# Patient Record
Sex: MALE | Race: WHITE | Employment: FULL TIME | URBAN - METROPOLITAN AREA
[De-identification: names, ages, dates, MRNs, and addresses within clinical notes are randomized per-mention and may not be internally consistent; named-entity substitution may affect disease eponyms.]

---

## 2017-04-03 ENCOUNTER — OFFICE VISIT (OUTPATIENT)
Dept: FAMILY MEDICINE CLINIC | Age: 57
End: 2017-04-03

## 2017-04-03 VITALS
RESPIRATION RATE: 20 BRPM | TEMPERATURE: 97.9 F | HEIGHT: 69 IN | WEIGHT: 207 LBS | SYSTOLIC BLOOD PRESSURE: 136 MMHG | DIASTOLIC BLOOD PRESSURE: 80 MMHG | HEART RATE: 68 BPM | BODY MASS INDEX: 30.66 KG/M2

## 2017-04-03 DIAGNOSIS — Z12.5 SCREENING PSA (PROSTATE SPECIFIC ANTIGEN): ICD-10-CM

## 2017-04-03 DIAGNOSIS — Z11.59 NEED FOR HEPATITIS C SCREENING TEST: ICD-10-CM

## 2017-04-03 DIAGNOSIS — Z79.4 CONTROLLED TYPE 2 DIABETES MELLITUS WITHOUT COMPLICATION, WITH LONG-TERM CURRENT USE OF INSULIN (HCC): Chronic | ICD-10-CM

## 2017-04-03 DIAGNOSIS — E11.9 CONTROLLED TYPE 2 DIABETES MELLITUS WITHOUT COMPLICATION, WITH LONG-TERM CURRENT USE OF INSULIN (HCC): Chronic | ICD-10-CM

## 2017-04-03 DIAGNOSIS — I10 ESSENTIAL HYPERTENSION: Primary | Chronic | ICD-10-CM

## 2017-04-03 DIAGNOSIS — E78.5 HYPERLIPIDEMIA, UNSPECIFIED HYPERLIPIDEMIA TYPE: Chronic | ICD-10-CM

## 2017-04-03 PROCEDURE — 99214 OFFICE O/P EST MOD 30 MIN: CPT | Performed by: FAMILY MEDICINE

## 2017-04-03 PROCEDURE — 90471 IMMUNIZATION ADMIN: CPT | Performed by: FAMILY MEDICINE

## 2017-04-03 PROCEDURE — 90670 PCV13 VACCINE IM: CPT | Performed by: FAMILY MEDICINE

## 2017-04-03 RX ORDER — SIMVASTATIN 40 MG
40 TABLET ORAL NIGHTLY
Qty: 90 TABLET | Refills: 3 | Status: SHIPPED | OUTPATIENT
Start: 2017-04-03 | End: 2018-03-29 | Stop reason: SDUPTHER

## 2017-04-03 RX ORDER — INSULIN GLULISINE 100 [IU]/ML
INJECTION, SOLUTION SUBCUTANEOUS
Qty: 15 PEN | Refills: 3 | Status: SHIPPED | OUTPATIENT
Start: 2017-04-03 | End: 2017-12-19 | Stop reason: SDUPTHER

## 2017-04-03 RX ORDER — LISINOPRIL 20 MG/1
20 TABLET ORAL DAILY
Qty: 90 TABLET | Refills: 3 | Status: SHIPPED | OUTPATIENT
Start: 2017-04-03 | End: 2018-02-05 | Stop reason: SDUPTHER

## 2017-04-03 ASSESSMENT — ENCOUNTER SYMPTOMS
RHINORRHEA: 0
CONSTIPATION: 0
EYE PAIN: 0
ABDOMINAL PAIN: 0
SHORTNESS OF BREATH: 0
COUGH: 0
SORE THROAT: 0
DIARRHEA: 0
NAUSEA: 0
SINUS PRESSURE: 0
ABDOMINAL DISTENTION: 0

## 2017-04-18 ENCOUNTER — TELEPHONE (OUTPATIENT)
Dept: FAMILY MEDICINE CLINIC | Age: 57
End: 2017-04-18

## 2017-10-09 RX ORDER — INSULIN GLARGINE 100 [IU]/ML
22 INJECTION, SOLUTION SUBCUTANEOUS NIGHTLY
Qty: 45 ML | Refills: 1 | Status: SHIPPED | OUTPATIENT
Start: 2017-10-09 | End: 2018-03-29 | Stop reason: SDUPTHER

## 2017-12-19 RX ORDER — INSULIN GLULISINE 100 [IU]/ML
INJECTION, SOLUTION SUBCUTANEOUS
Qty: 15 PEN | Refills: 3 | Status: SHIPPED | OUTPATIENT
Start: 2017-12-19 | End: 2019-04-09

## 2017-12-19 NOTE — TELEPHONE ENCOUNTER
Shawn Verdugo called requesting a refill on the following medications:  Requested Prescriptions     Pending Prescriptions Disp Refills    APIDRA SOLOSTAR 100 UNIT/ML injection pen 15 pen 3     Sig: Inject 0-30 units into skin three times per day per sliding scale     Pharmacy verified:  .[email or web address]     Date of last visit: 04-03-17   Date of next visit (if applicable): Visit date not found

## 2018-01-05 ENCOUNTER — OFFICE VISIT (OUTPATIENT)
Dept: FAMILY MEDICINE CLINIC | Age: 58
End: 2018-01-05
Payer: COMMERCIAL

## 2018-01-05 VITALS
BODY MASS INDEX: 30.35 KG/M2 | SYSTOLIC BLOOD PRESSURE: 100 MMHG | TEMPERATURE: 99.2 F | HEIGHT: 70 IN | RESPIRATION RATE: 16 BRPM | WEIGHT: 212 LBS | HEART RATE: 100 BPM | DIASTOLIC BLOOD PRESSURE: 68 MMHG

## 2018-01-05 DIAGNOSIS — J40 BRONCHITIS: Primary | ICD-10-CM

## 2018-01-05 PROCEDURE — 99213 OFFICE O/P EST LOW 20 MIN: CPT | Performed by: FAMILY MEDICINE

## 2018-01-05 RX ORDER — ALBUTEROL SULFATE 90 UG/1
2 AEROSOL, METERED RESPIRATORY (INHALATION) EVERY 6 HOURS PRN
Qty: 1 INHALER | Refills: 3 | Status: SHIPPED | OUTPATIENT
Start: 2018-01-05 | End: 2018-03-29 | Stop reason: ALTCHOICE

## 2018-01-05 RX ORDER — AZITHROMYCIN 500 MG/1
500 TABLET, FILM COATED ORAL DAILY
Qty: 3 TABLET | Refills: 0 | Status: SHIPPED | OUTPATIENT
Start: 2018-01-05 | End: 2018-01-08

## 2018-01-07 ASSESSMENT — ENCOUNTER SYMPTOMS
COUGH: 1
SINUS PAIN: 1
SORE THROAT: 1
NAUSEA: 0
CONSTIPATION: 0
SHORTNESS OF BREATH: 0
ABDOMINAL PAIN: 0
SINUS PRESSURE: 1
RHINORRHEA: 0
EYE PAIN: 0
WHEEZING: 1
ABDOMINAL DISTENTION: 0
DIARRHEA: 0

## 2018-01-07 NOTE — PROGRESS NOTES
[facility] [address]  [facility]  [address] [place] [address] Rd[address] [id number]  Dept: [phone]  Dept Fax: [phone]  Loc: [phone]  PROGRESS NOTE      Visit Date: 1/7/2018    Rizwana Reynolds is a 56 y.o. male who presents today for:  Chief Complaint   Patient presents with    URI     cough, congestion, sinus drainage, wheezing. Sx started earlier this week. Tried Mucinex D with no relief. Subjective:  HPI  Congestion and sinus drainage sore throat for several days. Symptoms progressing. Nothing makes better or worse. Failed over-the-counter medications. Symptoms are mild to moderate. Review of Systems   Constitutional: Negative for appetite change and fever. HENT: Positive for congestion, postnasal drip, sinus pain, sinus pressure and sore throat. Negative for ear pain and rhinorrhea. Eyes: Negative for pain and visual disturbance. Respiratory: Positive for cough and wheezing. Negative for shortness of breath. Cardiovascular: Negative for chest pain. Gastrointestinal: Negative for abdominal distention, abdominal pain, constipation, diarrhea and nausea. Genitourinary: Negative for dysuria, frequency and urgency. Musculoskeletal: Negative for arthralgias. Skin: Negative for rash. Neurological: Negative for dizziness. Past Medical History:   Diagnosis Date    Hyperlipidemia     Hypertension     Type II or unspecified type diabetes mellitus without mention of complication, not stated as uncontrolled       Past Surgical History:   Procedure Laterality Date    COLONOSCOPY  2/11/13         No family history on file.   Social History   Substance Use Topics    Smoking status: Never Smoker    Smokeless tobacco: Never Used    Alcohol use Yes      Comment: occas      Current Outpatient Prescriptions   Medication Sig Dispense Refill    albuterol sulfate HFA (PROAIR HFA) 108 (90 Base) MCG/ACT inhaler Inhale 2 puffs into the lungs every 6 hours as needed for Wheezing 1 Inhaler 3    azithromycin (ZITHROMAX) 500 MG tablet Take 1 tablet by mouth daily for 3 days 3 tablet 0    APIDRA SOLOSTAR 100 UNIT/ML injection pen Inject 0-30 units into skin three times per day per sliding scale 15 pen 3    LANTUS SOLOSTAR 100 UNIT/ML injection pen INJECT 22 UNITS INTO THE SKIN NIGHTLY. 45 mL 1    simvastatin (ZOCOR) 40 MG tablet Take 1 tablet by mouth nightly 90 tablet 3    lisinopril (PRINIVIL;ZESTRIL) 20 MG tablet Take 1 tablet by mouth daily 90 tablet 3    glucose blood VI test strips (ONE TOUCH ULTRA TEST) strip Use to test blood sugar 8 times daily dx E11.9 750 each 3    aspirin 81 MG EC tablet Take 81 mg by mouth daily. No current facility-administered medications for this visit. No Known Allergies  Health Maintenance   Topic Date Due    HIV screen  07/08/1977    Pneumococcal med risk (1 of 1 - PPSV23) 07/08/1981    DTaP/Tdap/Td vaccine (1 - Tdap) 11/29/2015    Diabetic foot exam  04/03/2018    A1C test (Diabetic or Prediabetic)  04/14/2018    Diabetic microalbuminuria test  04/14/2018    Lipid screen  04/14/2018    Potassium monitoring  04/14/2018    Creatinine monitoring  04/14/2018    Diabetic retinal exam  12/05/2018    Colon cancer screen colonoscopy  02/11/2023    Flu vaccine  Completed    Hepatitis C screen  Completed         Objective:     Physical Exam   Constitutional: He is oriented to person, place, and time. No distress. HENT:   Mouth/Throat: Oropharynx is clear and moist. No oropharyngeal exudate. Eyes: Conjunctivae are normal.   Neck: No thyromegaly present. No carotid bruits   Cardiovascular: Normal rate, regular rhythm, normal heart sounds and intact distal pulses. No murmur heard. Pulmonary/Chest: He has wheezes. He has no rales. Scattered rhonchi   Abdominal: Soft. Bowel sounds are normal. He exhibits no distension and no mass. There is no tenderness. There is no rebound and no guarding.    Musculoskeletal:

## 2018-02-05 RX ORDER — LISINOPRIL 20 MG/1
TABLET ORAL
Qty: 90 TABLET | Refills: 3 | Status: SHIPPED | OUTPATIENT
Start: 2018-02-05 | End: 2018-03-29 | Stop reason: SDUPTHER

## 2018-02-08 RX ORDER — SIMVASTATIN 40 MG
TABLET ORAL
Qty: 90 TABLET | Refills: 3 | OUTPATIENT
Start: 2018-02-08

## 2018-02-08 NOTE — TELEPHONE ENCOUNTER
Last refill was 4/3/17 for 90/3rf. Last seen 4/3/17, last labs 4/13/17. Pt should not be due until April. Jennifer at [facility] states pt does not need at this time this request was refused.

## 2018-03-16 DIAGNOSIS — I10 ESSENTIAL HYPERTENSION: Primary | Chronic | ICD-10-CM

## 2018-03-16 DIAGNOSIS — E11.9 CONTROLLED TYPE 2 DIABETES MELLITUS WITHOUT COMPLICATION, WITH LONG-TERM CURRENT USE OF INSULIN (HCC): Chronic | ICD-10-CM

## 2018-03-16 DIAGNOSIS — Z79.4 CONTROLLED TYPE 2 DIABETES MELLITUS WITHOUT COMPLICATION, WITH LONG-TERM CURRENT USE OF INSULIN (HCC): Chronic | ICD-10-CM

## 2018-03-16 DIAGNOSIS — Z12.5 SCREENING PSA (PROSTATE SPECIFIC ANTIGEN): ICD-10-CM

## 2018-03-16 DIAGNOSIS — E78.5 HYPERLIPIDEMIA, UNSPECIFIED HYPERLIPIDEMIA TYPE: Chronic | ICD-10-CM

## 2018-03-24 ENCOUNTER — HOSPITAL ENCOUNTER (OUTPATIENT)
Age: 58
Discharge: HOME | End: 2018-03-24
Payer: COMMERCIAL

## 2018-03-24 DIAGNOSIS — Z12.5 SCREENING PSA (PROSTATE SPECIFIC ANTIGEN): ICD-10-CM

## 2018-03-24 DIAGNOSIS — Z79.4 CONTROLLED TYPE 2 DIABETES MELLITUS WITHOUT COMPLICATION, WITH LONG-TERM CURRENT USE OF INSULIN (HCC): Chronic | ICD-10-CM

## 2018-03-24 DIAGNOSIS — E78.5 HYPERLIPIDEMIA, UNSPECIFIED HYPERLIPIDEMIA TYPE: Chronic | ICD-10-CM

## 2018-03-24 DIAGNOSIS — I10 ESSENTIAL HYPERTENSION: Chronic | ICD-10-CM

## 2018-03-24 DIAGNOSIS — E11.9 CONTROLLED TYPE 2 DIABETES MELLITUS WITHOUT COMPLICATION, WITH LONG-TERM CURRENT USE OF INSULIN (HCC): Chronic | ICD-10-CM

## 2018-03-24 LAB
ALBUMIN SERPL-MCNC: 4.5 G/DL (ref 3.5–5.1)
ALP BLD-CCNC: 62 U/L (ref 38–126)
ALT SERPL-CCNC: 9 U/L (ref 11–66)
ANION GAP SERPL CALCULATED.3IONS-SCNC: 12 MEQ/L (ref 8–16)
AST SERPL-CCNC: 18 U/L (ref 5–40)
AVERAGE GLUCOSE: 129 MG/DL (ref 70–126)
BILIRUB SERPL-MCNC: 1.2 MG/DL (ref 0.3–1.2)
BUN BLDV-MCNC: 17 MG/DL (ref 7–22)
CALCIUM SERPL-MCNC: 9.2 MG/DL (ref 8.5–10.5)
CHLORIDE BLD-SCNC: 105 MEQ/L (ref 98–111)
CHOLESTEROL, TOTAL: 162 MG/DL (ref 100–199)
CO2: 25 MEQ/L (ref 23–33)
CREAT SERPL-MCNC: 1 MG/DL (ref 0.4–1.2)
CREATININE, URINE: 115.1 MG/DL
GFR SERPL CREATININE-BSD FRML MDRD: 77 ML/MIN/1.73M2
GLUCOSE BLD-MCNC: 71 MG/DL (ref 70–108)
HBA1C MFR BLD: 6.3 % (ref 4.4–6.4)
HDLC SERPL-MCNC: 60 MG/DL
LDL CHOLESTEROL CALCULATED: 93 MG/DL
MICROALBUMIN UR-MCNC: < 1.2 MG/DL
MICROALBUMIN/CREAT UR-RTO: 10 MG/G (ref 0–30)
POTASSIUM SERPL-SCNC: 4.3 MEQ/L (ref 3.5–5.2)
PROSTATE SPECIFIC ANTIGEN: 2.5 NG/ML (ref 0–1)
SODIUM BLD-SCNC: 142 MEQ/L (ref 135–145)
TOTAL PROTEIN: 6.7 G/DL (ref 6.1–8)
TRIGL SERPL-MCNC: 43 MG/DL (ref 0–199)

## 2018-03-24 PROCEDURE — G0103 PSA SCREENING: HCPCS

## 2018-03-24 PROCEDURE — 83036 HEMOGLOBIN GLYCOSYLATED A1C: CPT

## 2018-03-24 PROCEDURE — 36415 COLL VENOUS BLD VENIPUNCTURE: CPT

## 2018-03-24 PROCEDURE — 80061 LIPID PANEL: CPT

## 2018-03-24 PROCEDURE — 82043 UR ALBUMIN QUANTITATIVE: CPT

## 2018-03-24 PROCEDURE — 80053 COMPREHEN METABOLIC PANEL: CPT

## 2018-03-29 ENCOUNTER — OFFICE VISIT (OUTPATIENT)
Dept: FAMILY MEDICINE CLINIC | Age: 58
End: 2018-03-29
Payer: COMMERCIAL

## 2018-03-29 VITALS
WEIGHT: 207.4 LBS | RESPIRATION RATE: 20 BRPM | BODY MASS INDEX: 29.97 KG/M2 | DIASTOLIC BLOOD PRESSURE: 82 MMHG | HEART RATE: 68 BPM | TEMPERATURE: 98 F | SYSTOLIC BLOOD PRESSURE: 114 MMHG

## 2018-03-29 DIAGNOSIS — I10 ESSENTIAL HYPERTENSION: Chronic | ICD-10-CM

## 2018-03-29 DIAGNOSIS — E78.5 HYPERLIPIDEMIA, UNSPECIFIED HYPERLIPIDEMIA TYPE: Chronic | ICD-10-CM

## 2018-03-29 DIAGNOSIS — Z12.5 SCREENING PSA (PROSTATE SPECIFIC ANTIGEN): ICD-10-CM

## 2018-03-29 DIAGNOSIS — Z79.4 CONTROLLED TYPE 2 DIABETES MELLITUS WITHOUT COMPLICATION, WITH LONG-TERM CURRENT USE OF INSULIN (HCC): Primary | Chronic | ICD-10-CM

## 2018-03-29 DIAGNOSIS — E11.9 CONTROLLED TYPE 2 DIABETES MELLITUS WITHOUT COMPLICATION, WITH LONG-TERM CURRENT USE OF INSULIN (HCC): Primary | Chronic | ICD-10-CM

## 2018-03-29 PROCEDURE — 99214 OFFICE O/P EST MOD 30 MIN: CPT | Performed by: FAMILY MEDICINE

## 2018-03-29 RX ORDER — SIMVASTATIN 40 MG
40 TABLET ORAL NIGHTLY
Qty: 90 TABLET | Refills: 3 | Status: SHIPPED | OUTPATIENT
Start: 2018-03-29 | End: 2019-01-30 | Stop reason: SDUPTHER

## 2018-03-29 RX ORDER — LISINOPRIL 20 MG/1
TABLET ORAL
Qty: 90 TABLET | Refills: 3 | Status: SHIPPED | OUTPATIENT
Start: 2018-03-29 | End: 2019-04-09

## 2018-03-29 ASSESSMENT — PATIENT HEALTH QUESTIONNAIRE - PHQ9
2. FEELING DOWN, DEPRESSED OR HOPELESS: 0
SUM OF ALL RESPONSES TO PHQ9 QUESTIONS 1 & 2: 0
SUM OF ALL RESPONSES TO PHQ QUESTIONS 1-9: 0
1. LITTLE INTEREST OR PLEASURE IN DOING THINGS: 0

## 2018-04-01 ASSESSMENT — ENCOUNTER SYMPTOMS
SHORTNESS OF BREATH: 0
CONSTIPATION: 0
RHINORRHEA: 0
ABDOMINAL PAIN: 0
SORE THROAT: 0
DIARRHEA: 0
COUGH: 0
NAUSEA: 0
EYE PAIN: 0
SINUS PRESSURE: 0
ABDOMINAL DISTENTION: 0

## 2018-04-12 DIAGNOSIS — Z79.4 CONTROLLED TYPE 2 DIABETES MELLITUS WITHOUT COMPLICATION, WITH LONG-TERM CURRENT USE OF INSULIN (HCC): Primary | Chronic | ICD-10-CM

## 2018-04-12 DIAGNOSIS — E11.9 CONTROLLED TYPE 2 DIABETES MELLITUS WITHOUT COMPLICATION, WITH LONG-TERM CURRENT USE OF INSULIN (HCC): Primary | Chronic | ICD-10-CM

## 2018-07-16 RX ORDER — INSULIN ASPART 100 [IU]/ML
INJECTION, SOLUTION INTRAVENOUS; SUBCUTANEOUS
Qty: 15 PEN | Refills: 2 | Status: SHIPPED | OUTPATIENT
Start: 2018-07-16 | End: 2018-09-22 | Stop reason: SDUPTHER

## 2018-09-24 RX ORDER — INSULIN ASPART 100 [IU]/ML
INJECTION, SOLUTION INTRAVENOUS; SUBCUTANEOUS
Qty: 15 ML | Refills: 5 | Status: SHIPPED | OUTPATIENT
Start: 2018-09-24 | End: 2019-01-21 | Stop reason: SDUPTHER

## 2018-11-13 ENCOUNTER — NURSE ONLY (OUTPATIENT)
Dept: FAMILY MEDICINE CLINIC | Age: 58
End: 2018-11-13
Payer: COMMERCIAL

## 2018-11-13 DIAGNOSIS — E11.9 CONTROLLED TYPE 2 DIABETES MELLITUS WITHOUT COMPLICATION, WITH LONG-TERM CURRENT USE OF INSULIN (HCC): Chronic | ICD-10-CM

## 2018-11-13 DIAGNOSIS — E78.5 HYPERLIPIDEMIA, UNSPECIFIED HYPERLIPIDEMIA TYPE: Chronic | ICD-10-CM

## 2018-11-13 DIAGNOSIS — Z12.5 SCREENING PSA (PROSTATE SPECIFIC ANTIGEN): ICD-10-CM

## 2018-11-13 DIAGNOSIS — Z79.4 CONTROLLED TYPE 2 DIABETES MELLITUS WITHOUT COMPLICATION, WITH LONG-TERM CURRENT USE OF INSULIN (HCC): Chronic | ICD-10-CM

## 2018-11-13 LAB
ALBUMIN SERPL-MCNC: 4.7 G/DL (ref 3.5–5.1)
ALP BLD-CCNC: 64 U/L (ref 38–126)
ALT SERPL-CCNC: 9 U/L (ref 11–66)
ANION GAP SERPL CALCULATED.3IONS-SCNC: 12 MEQ/L (ref 8–16)
AST SERPL-CCNC: 18 U/L (ref 5–40)
AVERAGE GLUCOSE: 132 MG/DL (ref 70–126)
BILIRUB SERPL-MCNC: 1.5 MG/DL (ref 0.3–1.2)
BUN BLDV-MCNC: 14 MG/DL (ref 7–22)
CALCIUM SERPL-MCNC: 10 MG/DL (ref 8.5–10.5)
CHLORIDE BLD-SCNC: 101 MEQ/L (ref 98–111)
CHOLESTEROL, TOTAL: 160 MG/DL (ref 100–199)
CO2: 28 MEQ/L (ref 23–33)
CREAT SERPL-MCNC: 0.9 MG/DL (ref 0.4–1.2)
CREATININE, URINE: 87.6 MG/DL
GFR SERPL CREATININE-BSD FRML MDRD: 87 ML/MIN/1.73M2
GLUCOSE BLD-MCNC: 92 MG/DL (ref 70–108)
HBA1C MFR BLD: 6.4 % (ref 4.4–6.4)
HDLC SERPL-MCNC: 64 MG/DL
LDL CHOLESTEROL CALCULATED: 84 MG/DL
MICROALBUMIN UR-MCNC: < 1.2 MG/DL
MICROALBUMIN/CREAT UR-RTO: 14 MG/G (ref 0–30)
POTASSIUM SERPL-SCNC: 4.7 MEQ/L (ref 3.5–5.2)
PROSTATE SPECIFIC ANTIGEN: 2.22 NG/ML (ref 0–1)
SODIUM BLD-SCNC: 141 MEQ/L (ref 135–145)
TOTAL PROTEIN: 7 G/DL (ref 6.1–8)
TRIGL SERPL-MCNC: 59 MG/DL (ref 0–199)

## 2018-11-13 PROCEDURE — 36415 COLL VENOUS BLD VENIPUNCTURE: CPT | Performed by: FAMILY MEDICINE

## 2019-01-30 RX ORDER — SIMVASTATIN 40 MG
40 TABLET ORAL NIGHTLY
Qty: 90 TABLET | Refills: 0 | Status: SHIPPED | OUTPATIENT
Start: 2019-01-30 | End: 2019-04-09 | Stop reason: SDUPTHER

## 2019-02-25 RX ORDER — LISINOPRIL 20 MG/1
TABLET ORAL
Qty: 90 TABLET | Refills: 0 | Status: SHIPPED | OUTPATIENT
Start: 2019-02-25 | End: 2019-04-09 | Stop reason: SDUPTHER

## 2019-04-09 ENCOUNTER — OFFICE VISIT (OUTPATIENT)
Dept: FAMILY MEDICINE CLINIC | Age: 59
End: 2019-04-09
Payer: COMMERCIAL

## 2019-04-09 VITALS
BODY MASS INDEX: 29.72 KG/M2 | WEIGHT: 207.6 LBS | HEIGHT: 70 IN | TEMPERATURE: 98.6 F | DIASTOLIC BLOOD PRESSURE: 92 MMHG | SYSTOLIC BLOOD PRESSURE: 146 MMHG | HEART RATE: 80 BPM | RESPIRATION RATE: 16 BRPM

## 2019-04-09 DIAGNOSIS — E10.649 TYPE 1 DIABETES MELLITUS WITH HYPOGLYCEMIA AND WITHOUT COMA (HCC): Primary | ICD-10-CM

## 2019-04-09 DIAGNOSIS — I10 ESSENTIAL HYPERTENSION: Chronic | ICD-10-CM

## 2019-04-09 DIAGNOSIS — E78.5 HYPERLIPIDEMIA, UNSPECIFIED HYPERLIPIDEMIA TYPE: Chronic | ICD-10-CM

## 2019-04-09 PROCEDURE — 99214 OFFICE O/P EST MOD 30 MIN: CPT | Performed by: FAMILY MEDICINE

## 2019-04-09 RX ORDER — LISINOPRIL 20 MG/1
TABLET ORAL
Qty: 90 TABLET | Refills: 3 | Status: CANCELLED | OUTPATIENT
Start: 2019-04-09

## 2019-04-09 RX ORDER — LOSARTAN POTASSIUM 100 MG/1
100 TABLET ORAL DAILY
Qty: 90 TABLET | Refills: 3 | Status: SHIPPED | OUTPATIENT
Start: 2019-04-09 | End: 2019-04-15 | Stop reason: ALTCHOICE

## 2019-04-09 RX ORDER — SIMVASTATIN 40 MG
40 TABLET ORAL NIGHTLY
Qty: 90 TABLET | Refills: 3 | Status: SHIPPED | OUTPATIENT
Start: 2019-04-09 | End: 2020-01-28

## 2019-04-09 ASSESSMENT — PATIENT HEALTH QUESTIONNAIRE - PHQ9
SUM OF ALL RESPONSES TO PHQ QUESTIONS 1-9: 0
SUM OF ALL RESPONSES TO PHQ QUESTIONS 1-9: 0
2. FEELING DOWN, DEPRESSED OR HOPELESS: 0
SUM OF ALL RESPONSES TO PHQ9 QUESTIONS 1 & 2: 0
1. LITTLE INTEREST OR PLEASURE IN DOING THINGS: 0

## 2019-04-12 ASSESSMENT — ENCOUNTER SYMPTOMS
EYE PAIN: 0
SORE THROAT: 0
COUGH: 0
SHORTNESS OF BREATH: 0
ABDOMINAL DISTENTION: 0
DIARRHEA: 0
ABDOMINAL PAIN: 0
RHINORRHEA: 0
NAUSEA: 0
SINUS PRESSURE: 0
CONSTIPATION: 0

## 2019-04-13 NOTE — PROGRESS NOTES
[address] [address]  Dept: [phone]  Dept Fax: [phone]  Loc: [phone]  PROGRESS NOTE      VisitDate: 4/9/2019    Alex Churchill is a 66 y.o. male who presents today for:     Chief Complaint   Patient presents with    Hypertension    Other     wants HgbA1C done    Referral - General     Diabetes    Orders     due for labs         Subjective:  HPI  Follow-up hypertension diabetes and hyperlipidemia. Blood pressures been running high. Patient has felt some fatigue but otherwise no symptoms. Pt has history of hypoglycemic episodes causing confusion, bs into 30's. Has become confused at work, and awakens at times with low bs. Lately,  Blood sugars have been stable. History of hyperlipidemia stable. We discussed potential referral to endocrinology for possible pump or ongoing diabetic management. Review of Systems   Constitutional: Negative for appetite change and fever. HENT: Negative for congestion, ear pain, postnasal drip, rhinorrhea, sinus pressure and sore throat. Eyes: Negative for pain and visual disturbance. Respiratory: Negative for cough and shortness of breath. Cardiovascular: Negative for chest pain. Gastrointestinal: Negative for abdominal distention, abdominal pain, constipation, diarrhea and nausea. Genitourinary: Negative for dysuria, frequency and urgency. Musculoskeletal: Negative for arthralgias. Skin: Negative for rash. Neurological: Negative for dizziness. Past Medical History:   Diagnosis Date    Hyperlipidemia     Hypertension     Type II or unspecified type diabetes mellitus without mention of complication, not stated as uncontrolled       Past Surgical History:   Procedure Laterality Date    COLONOSCOPY  2/11/13         No family history on file.   Social History     Tobacco Use    Smoking status: Never Smoker    Smokeless tobacco: Never Used Substance Use Topics    Alcohol use: Yes     Comment: occas      Current Outpatient Medications   Medication Sig Dispense Refill    simvastatin (ZOCOR) 40 MG tablet Take 1 tablet by mouth nightly 90 tablet 3    losartan (COZAAR) 100 MG tablet Take 1 tablet by mouth daily 90 tablet 3    insulin aspart (NOVOLOG FLEXPEN) 100 UNIT/ML injection pen inject 0 to 30 units subcutaneously three times a day PER SLIDING SCALE. 15 mL 5    insulin glargine (LANTUS SOLOSTAR) 100 UNIT/ML injection pen Inject 22 Units into the skin nightly 45 mL 3    ONE TOUCH ULTRA TEST strip USE TO TEST BLOOD SUGAR 8 TIMES A  strip 3    aspirin 81 MG EC tablet Take 81 mg by mouth daily. No current facility-administered medications for this visit. No Known Allergies  Health Maintenance   Topic Date Due    HIV screen  07/08/1977    Hepatitis B Vaccine (1 of 3 - Risk 3-dose series) 07/08/1981    Shingles Vaccine (1 of 2) 07/08/2012    DTaP/Tdap/Td vaccine (1 - Tdap) 11/29/2015    Pneumococcal 0-64 years Vaccine (1 of 1 - PPSV23) 05/29/2017    Diabetic foot exam  04/03/2018    Flu vaccine (Season Ended) 09/01/2019    A1C test (Diabetic or Prediabetic)  11/13/2019    Diabetic microalbuminuria test  11/13/2019    Lipid screen  11/13/2019    Potassium monitoring  11/13/2019    Creatinine monitoring  11/13/2019    Diabetic retinal exam  12/11/2019    Colon cancer screen colonoscopy  02/11/2023    Hepatitis C screen  Completed         Objective:     Physical Exam   Constitutional: He is oriented to person, place, and time. He appears well-developed and well-nourished. No distress. HENT:   Head: Normocephalic and atraumatic. Right Ear: External ear normal.   Left Ear: External ear normal.   Eyes: Conjunctivae are normal.   Neck: No JVD present. Cardiovascular: Normal rate, regular rhythm and normal heart sounds. Pulmonary/Chest: Effort normal and breath sounds normal. He has no wheezes. He has no rales. Musculoskeletal: He exhibits no edema or tenderness. Neurological: He is alert and oriented to person, place, and time. Skin: Skin is warm and dry. He is not diaphoretic. No pallor. BP (!) 146/92 (Site: Left Upper Arm, Position: Sitting)   Pulse 80   Temp 98.6 °F (37 °C) (Oral)   Resp 16   Ht 5' 9.75\" (1.772 m)   Wt 207 lb 9.6 oz (94.2 kg)   BMI 30.00 kg/m²       Impression/Plan:  1. Type 1 diabetes mellitus with hypoglycemia and without coma ([facility] [address]    2. Essential hypertension    3. Hyperlipidemia, unspecified hyperlipidemia type      Requested Prescriptions     Signed Prescriptions Disp Refills    simvastatin (ZOCOR) 40 MG tablet 90 tablet 3     Sig: Take 1 tablet by mouth nightly    losartan (COZAAR) 100 MG tablet 90 tablet 3     Sig: Take 1 tablet by mouth daily     Orders Placed This Encounter   Procedures    Hemoglobin A1C     Standing Status:   Future     Standing Expiration Date:   4/8/2020    Comprehensive Metabolic Panel     Standing Status:   Future     Standing Expiration Date:   4/8/2020    Lipid Panel     Standing Status:   Future     Standing Expiration Date:   4/8/2020     Order Specific Question:   Is Patient Fasting?/# of Hours     Answer:   yes/12       Patient giveneducational materials - see patient instructions. Discussed use, benefit, and side effects of prescribed medications. All patient questions answered. Pt voiced understanding. Reviewed health maintenance. Patient agreedwith treatment plan. Follow up as directed. **This report has been created using voice recognition software. It may contain minor errorswhich are inherent in voice recognition technology. **       Electronically signed by Cezar Beard MD on 4/12/2019 at 9:57 PM

## 2019-04-15 ENCOUNTER — TELEPHONE (OUTPATIENT)
Dept: FAMILY MEDICINE CLINIC | Age: 59
End: 2019-04-15

## 2019-04-15 RX ORDER — LOSARTAN POTASSIUM AND HYDROCHLOROTHIAZIDE 25; 100 MG/1; MG/1
1 TABLET ORAL DAILY
Qty: 30 TABLET | Refills: 1 | Status: SHIPPED | OUTPATIENT
Start: 2019-04-15 | End: 2019-06-10 | Stop reason: SDUPTHER

## 2019-04-22 DIAGNOSIS — Z79.4 CONTROLLED TYPE 2 DIABETES MELLITUS WITHOUT COMPLICATION, WITH LONG-TERM CURRENT USE OF INSULIN (HCC): Chronic | ICD-10-CM

## 2019-04-22 DIAGNOSIS — E11.9 CONTROLLED TYPE 2 DIABETES MELLITUS WITHOUT COMPLICATION, WITH LONG-TERM CURRENT USE OF INSULIN (HCC): Chronic | ICD-10-CM

## 2019-04-22 NOTE — TELEPHONE ENCOUNTER
Faxed Rx request from [facility] [place] from Lilly Villalobos use 8 times daily  Last written:04/12/2018 750 strips with 3 refills  Last seen:04/09/2019, No future appointments  Rx verified,ordered,and set to escribe

## 2019-04-23 ENCOUNTER — NURSE ONLY (OUTPATIENT)
Dept: FAMILY MEDICINE CLINIC | Age: 59
End: 2019-04-23
Payer: COMMERCIAL

## 2019-04-23 DIAGNOSIS — E78.5 HYPERLIPIDEMIA, UNSPECIFIED HYPERLIPIDEMIA TYPE: Chronic | ICD-10-CM

## 2019-04-23 DIAGNOSIS — Z79.4 CONTROLLED TYPE 2 DIABETES MELLITUS WITHOUT COMPLICATION, WITH LONG-TERM CURRENT USE OF INSULIN (HCC): Chronic | ICD-10-CM

## 2019-04-23 DIAGNOSIS — E11.9 CONTROLLED TYPE 2 DIABETES MELLITUS WITHOUT COMPLICATION, WITH LONG-TERM CURRENT USE OF INSULIN (HCC): Chronic | ICD-10-CM

## 2019-04-23 LAB
ALBUMIN SERPL-MCNC: 4.6 G/DL (ref 3.5–5.1)
ALP BLD-CCNC: 67 U/L (ref 38–126)
ALT SERPL-CCNC: 10 U/L (ref 11–66)
ANION GAP SERPL CALCULATED.3IONS-SCNC: 11 MEQ/L (ref 8–16)
AST SERPL-CCNC: 21 U/L (ref 5–40)
AVERAGE GLUCOSE: 141 MG/DL (ref 70–126)
BILIRUB SERPL-MCNC: 1.4 MG/DL (ref 0.3–1.2)
BUN BLDV-MCNC: 16 MG/DL (ref 7–22)
CALCIUM SERPL-MCNC: 9.4 MG/DL (ref 8.5–10.5)
CHLORIDE BLD-SCNC: 100 MEQ/L (ref 98–111)
CHOLESTEROL, TOTAL: 164 MG/DL (ref 100–199)
CO2: 27 MEQ/L (ref 23–33)
CREAT SERPL-MCNC: 0.9 MG/DL (ref 0.4–1.2)
GFR SERPL CREATININE-BSD FRML MDRD: 87 ML/MIN/1.73M2
GLUCOSE BLD-MCNC: 109 MG/DL (ref 70–108)
HBA1C MFR BLD: 6.7 % (ref 4.4–6.4)
HDLC SERPL-MCNC: 59 MG/DL
LDL CHOLESTEROL CALCULATED: 88 MG/DL
POTASSIUM SERPL-SCNC: 4 MEQ/L (ref 3.5–5.2)
SODIUM BLD-SCNC: 138 MEQ/L (ref 135–145)
TOTAL PROTEIN: 6.6 G/DL (ref 6.1–8)
TRIGL SERPL-MCNC: 83 MG/DL (ref 0–199)

## 2019-04-23 PROCEDURE — 36415 COLL VENOUS BLD VENIPUNCTURE: CPT | Performed by: FAMILY MEDICINE

## 2019-04-25 ENCOUNTER — TELEPHONE (OUTPATIENT)
Dept: FAMILY MEDICINE CLINIC | Age: 59
End: 2019-04-25

## 2019-04-25 NOTE — TELEPHONE ENCOUNTER
----- Message from Prosper Cobb MD sent at 4/24/2019  6:32 AM EDT -----  Results are ok. Please let the patient know.

## 2019-04-30 ENCOUNTER — TELEPHONE (OUTPATIENT)
Dept: FAMILY MEDICINE CLINIC | Age: 59
End: 2019-04-30

## 2019-04-30 NOTE — TELEPHONE ENCOUNTER
Pt states he was started on losartan hctz 100/25 on 4/15/19 and b/p readings continue to be high. States he takes the med at night and the readings are first thing in the morning and periodically throughout the day.      4/23 167/87  4/24 147/102           162/99           168/94  4/25  150/96           185/101    4/26  162/96           166/91  4/27  171/98  4/28  174/94           152/88  4/29  172/80           154/103           174/100  4/30  165/96

## 2019-05-01 RX ORDER — AMLODIPINE BESYLATE 5 MG/1
5 TABLET ORAL DAILY
Qty: 30 TABLET | Refills: 2 | Status: SHIPPED | OUTPATIENT
Start: 2019-05-01 | End: 2019-05-10 | Stop reason: DRUGHIGH

## 2019-05-10 ENCOUNTER — TELEPHONE (OUTPATIENT)
Dept: FAMILY MEDICINE CLINIC | Age: 59
End: 2019-05-10

## 2019-05-10 RX ORDER — AMLODIPINE BESYLATE 10 MG/1
10 TABLET ORAL DAILY
Qty: 90 TABLET | Refills: 3 | Status: SHIPPED | OUTPATIENT
Start: 2019-05-10 | End: 2020-05-18

## 2019-05-10 NOTE — TELEPHONE ENCOUNTER
Pt states amlodipine was added 5/1/19 and was to continue to monitor b/p. States he takes amlodipine and losartan hctz at bedtime, readings have improved and will continue monitor.     5/2  147/90         136/96          146/91  5/3   135/91          135/89  5/4   145/87          171/90  5/5   142/86          130/75  5/6   152/88          142/88          125/73  5/7   153/90          125/77  5/8   162/91          138/89          132/84  5/9   142/85

## 2019-05-13 NOTE — TELEPHONE ENCOUNTER
Patients wife calling stating patient needs a refill on Novolog Flexpen 100 unit/mL  Last written:01/21/2019 15mL with 5 refills  Last seen:04/09/2019, No future appointments  Rx verified,ordered,and set to [facility]

## 2019-05-28 ENCOUNTER — TELEPHONE (OUTPATIENT)
Dept: FAMILY MEDICINE CLINIC | Age: 59
End: 2019-05-28

## 2019-05-28 DIAGNOSIS — Z79.4 CONTROLLED TYPE 2 DIABETES MELLITUS WITHOUT COMPLICATION, WITH LONG-TERM CURRENT USE OF INSULIN (HCC): Primary | ICD-10-CM

## 2019-05-28 DIAGNOSIS — E11.9 CONTROLLED TYPE 2 DIABETES MELLITUS WITHOUT COMPLICATION, WITH LONG-TERM CURRENT USE OF INSULIN (HCC): Primary | ICD-10-CM

## 2019-05-28 RX ORDER — BLOOD-GLUCOSE,RECEIVER,CONT
EACH MISCELLANEOUS
Qty: 1 DEVICE | Refills: 0 | Status: SHIPPED | OUTPATIENT
Start: 2019-05-28 | End: 2019-05-29 | Stop reason: SDUPTHER

## 2019-05-28 RX ORDER — BLOOD-GLUCOSE TRANSMITTER
EACH MISCELLANEOUS
Qty: 12 EACH | Refills: 3 | Status: SHIPPED | OUTPATIENT
Start: 2019-05-28 | End: 2019-05-29 | Stop reason: SDUPTHER

## 2019-05-28 RX ORDER — BLOOD-GLUCOSE SENSOR
EACH MISCELLANEOUS
Qty: 12 EACH | Refills: 3 | Status: SHIPPED | OUTPATIENT
Start: 2019-05-28 | End: 2019-05-29 | Stop reason: SDUPTHER

## 2019-05-29 RX ORDER — BLOOD-GLUCOSE SENSOR
EACH MISCELLANEOUS
Qty: 12 EACH | Refills: 3 | Status: SHIPPED | OUTPATIENT
Start: 2019-05-29 | End: 2019-06-12 | Stop reason: SDUPTHER

## 2019-05-29 RX ORDER — BLOOD-GLUCOSE TRANSMITTER
EACH MISCELLANEOUS
Qty: 12 EACH | Refills: 3 | Status: SHIPPED | OUTPATIENT
Start: 2019-05-29 | End: 2019-06-12 | Stop reason: SDUPTHER

## 2019-05-29 RX ORDER — BLOOD-GLUCOSE,RECEIVER,CONT
EACH MISCELLANEOUS
Qty: 1 DEVICE | Refills: 0 | Status: SHIPPED | OUTPATIENT
Start: 2019-05-29 | End: 2019-06-12 | Stop reason: SDUPTHER

## 2019-06-11 RX ORDER — LOSARTAN POTASSIUM AND HYDROCHLOROTHIAZIDE 25; 100 MG/1; MG/1
TABLET ORAL
Qty: 30 TABLET | Refills: 5 | Status: SHIPPED | OUTPATIENT
Start: 2019-06-11 | End: 2019-11-30 | Stop reason: SDUPTHER

## 2019-06-12 RX ORDER — BLOOD-GLUCOSE TRANSMITTER
EACH MISCELLANEOUS
Qty: 12 EACH | Refills: 3 | Status: SHIPPED | OUTPATIENT
Start: 2019-06-12 | End: 2022-03-30 | Stop reason: SDUPTHER

## 2019-06-12 RX ORDER — BLOOD-GLUCOSE SENSOR
EACH MISCELLANEOUS
Qty: 12 EACH | Refills: 3 | Status: SHIPPED | OUTPATIENT
Start: 2019-06-12 | End: 2022-03-30 | Stop reason: SDUPTHER

## 2019-06-12 RX ORDER — BLOOD-GLUCOSE,RECEIVER,CONT
EACH MISCELLANEOUS
Qty: 1 DEVICE | Refills: 0 | Status: SHIPPED | OUTPATIENT
Start: 2019-06-12

## 2019-06-12 NOTE — TELEPHONE ENCOUNTER
Spoke with Cris at [address] [facility] at [phone]. She states to fax over Rx's to [phone] or [phone] and they will try and run it through pt's insurance. If any questions she will call our office back. Rx's faxed.

## 2019-06-14 NOTE — TELEPHONE ENCOUNTER
Informed that insurance will not cover, he will call and see what is covered and get back with either Ivone Crump or myself.

## 2019-06-14 NOTE — TELEPHONE ENCOUNTER
Spoke with Iliana Crenshaw at [facility] at [phone]-smaewl he will fax a CMN to be filled out and faxed back with chart notes and insurance cards. States he has to be type 1 or 2, test 4 times a day, injecting insulin 3 times a day or have a pump. He needs to have severe hypoglycemia 1 or 2 episodes, has to be compliant with testing and following Dr. Kalpana Hill. Pt was informed of this and to drop off bs readings.

## 2019-06-20 PROBLEM — E10.649 TYPE 1 DIABETES MELLITUS WITH HYPOGLYCEMIA AND WITHOUT COMA (HCC): Status: ACTIVE | Noted: 2019-06-20

## 2019-06-21 NOTE — TELEPHONE ENCOUNTER
Left a message on [employer] pt's wife machine informing her CMN was faxed to [facility] attn Cris Shabazz at [phone]. Confirmation was received.

## 2019-09-17 ENCOUNTER — TELEPHONE (OUTPATIENT)
Dept: FAMILY MEDICINE CLINIC | Age: 59
End: 2019-09-17

## 2019-09-17 DIAGNOSIS — E10.649 TYPE 1 DIABETES MELLITUS WITH HYPOGLYCEMIA AND WITHOUT COMA (HCC): Primary | ICD-10-CM

## 2019-09-17 NOTE — TELEPHONE ENCOUNTER
Kulwant Modi pt's wife was informed of 's orders. The a1c is ordered in epic. She will have him call and schedule a nurse visit. Germain Bell pharmacist at [facility] doesn't cover glucose gel or tablets due to being otc .  She will notify them when they come in

## 2019-09-25 ENCOUNTER — NURSE ONLY (OUTPATIENT)
Dept: FAMILY MEDICINE CLINIC | Age: 59
End: 2019-09-25
Payer: COMMERCIAL

## 2019-09-25 DIAGNOSIS — E10.649 TYPE 1 DIABETES MELLITUS WITH HYPOGLYCEMIA AND WITHOUT COMA (HCC): Primary | ICD-10-CM

## 2019-09-25 LAB — HBA1C MFR BLD: 6.9 %

## 2019-09-25 PROCEDURE — 83036 HEMOGLOBIN GLYCOSYLATED A1C: CPT | Performed by: FAMILY MEDICINE

## 2019-12-02 RX ORDER — LOSARTAN POTASSIUM AND HYDROCHLOROTHIAZIDE 25; 100 MG/1; MG/1
TABLET ORAL
Qty: 30 TABLET | Refills: 5 | Status: SHIPPED | OUTPATIENT
Start: 2019-12-02 | End: 2020-01-28 | Stop reason: RX

## 2019-12-19 RX ORDER — INSULIN GLARGINE 100 [IU]/ML
INJECTION, SOLUTION SUBCUTANEOUS
Qty: 45 ML | Refills: 3 | Status: SHIPPED | OUTPATIENT
Start: 2019-12-19 | End: 2021-01-12

## 2020-01-28 RX ORDER — LOSARTAN POTASSIUM 100 MG/1
100 TABLET ORAL DAILY
Qty: 90 TABLET | Refills: 0 | Status: SHIPPED | OUTPATIENT
Start: 2020-01-28 | End: 2020-05-18

## 2020-01-28 RX ORDER — SIMVASTATIN 40 MG
TABLET ORAL
Qty: 90 TABLET | Refills: 0 | Status: SHIPPED | OUTPATIENT
Start: 2020-01-28 | End: 2020-07-17

## 2020-01-28 RX ORDER — HYDROCHLOROTHIAZIDE 25 MG/1
25 TABLET ORAL EVERY MORNING
Qty: 90 TABLET | Refills: 0 | Status: SHIPPED | OUTPATIENT
Start: 2020-01-28 | End: 2020-05-18

## 2020-02-11 ENCOUNTER — TELEPHONE (OUTPATIENT)
Dept: FAMILY MEDICINE CLINIC | Age: 60
End: 2020-02-11

## 2020-02-18 ENCOUNTER — TELEPHONE (OUTPATIENT)
Dept: FAMILY MEDICINE CLINIC | Age: 60
End: 2020-02-18

## 2020-04-01 ENCOUNTER — TELEPHONE (OUTPATIENT)
Dept: FAMILY MEDICINE CLINIC | Age: 60
End: 2020-04-01

## 2020-04-01 NOTE — TELEPHONE ENCOUNTER
4/1/20 Patient spouse Barbara Lee, requesting Sonya Johnson opinion on patient continuation on working at [employer] with conditions and his diabetes? Please advise.   Jonathan/karen  Dolv: 4/9/19

## 2020-04-06 ENCOUNTER — TELEPHONE (OUTPATIENT)
Dept: FAMILY MEDICINE CLINIC | Age: 60
End: 2020-04-06

## 2020-04-06 NOTE — LETTER
[address]  [address]  [facility] [address]  Phone: [phone]  Fax: [phone]    Breanna Walsh MD        April 6, 2020     Patient: Javier Cleary   YOB: 1960   Date of Visit: 4/6/2020       To Whom It May Concern: It is my medical opinion that Lori Horta should remain out of work until 5/4/20 due to medical conditions making pt high risk with covid-19. If you have any questions or concerns, please don't hesitate to call.     Sincerely,        Breanna Walsh MD

## 2020-05-18 RX ORDER — HYDROCHLOROTHIAZIDE 25 MG/1
25 TABLET ORAL EVERY MORNING
Qty: 90 TABLET | Refills: 0 | Status: SHIPPED | OUTPATIENT
Start: 2020-05-18 | End: 2020-07-20 | Stop reason: SDUPTHER

## 2020-05-18 RX ORDER — AMLODIPINE BESYLATE 10 MG/1
TABLET ORAL
Qty: 90 TABLET | Refills: 3 | Status: SHIPPED | OUTPATIENT
Start: 2020-05-18 | End: 2021-04-20 | Stop reason: SDUPTHER

## 2020-05-18 RX ORDER — LOSARTAN POTASSIUM 100 MG/1
TABLET ORAL
Qty: 90 TABLET | Refills: 0 | Status: SHIPPED | OUTPATIENT
Start: 2020-05-18 | End: 2020-07-20 | Stop reason: SDUPTHER

## 2020-07-17 RX ORDER — SIMVASTATIN 40 MG
TABLET ORAL
Qty: 90 TABLET | Refills: 0 | Status: SHIPPED | OUTPATIENT
Start: 2020-07-17 | End: 2020-07-20 | Stop reason: SDUPTHER

## 2020-07-20 ENCOUNTER — OFFICE VISIT (OUTPATIENT)
Dept: FAMILY MEDICINE CLINIC | Age: 60
End: 2020-07-20
Payer: COMMERCIAL

## 2020-07-20 VITALS
SYSTOLIC BLOOD PRESSURE: 118 MMHG | WEIGHT: 208.4 LBS | HEART RATE: 84 BPM | RESPIRATION RATE: 16 BRPM | DIASTOLIC BLOOD PRESSURE: 78 MMHG | TEMPERATURE: 98.3 F | HEIGHT: 69 IN | BODY MASS INDEX: 30.87 KG/M2

## 2020-07-20 PROCEDURE — 90732 PPSV23 VACC 2 YRS+ SUBQ/IM: CPT | Performed by: FAMILY MEDICINE

## 2020-07-20 PROCEDURE — 99214 OFFICE O/P EST MOD 30 MIN: CPT | Performed by: FAMILY MEDICINE

## 2020-07-20 PROCEDURE — 90471 IMMUNIZATION ADMIN: CPT | Performed by: FAMILY MEDICINE

## 2020-07-20 RX ORDER — SIMVASTATIN 40 MG
TABLET ORAL
Qty: 90 TABLET | Refills: 3 | Status: SHIPPED | OUTPATIENT
Start: 2020-07-20 | End: 2021-04-20 | Stop reason: SDUPTHER

## 2020-07-20 RX ORDER — LOSARTAN POTASSIUM 100 MG/1
TABLET ORAL
Qty: 90 TABLET | Refills: 3 | Status: SHIPPED | OUTPATIENT
Start: 2020-07-20 | End: 2021-04-20 | Stop reason: SDUPTHER

## 2020-07-20 RX ORDER — HYDROCHLOROTHIAZIDE 25 MG/1
25 TABLET ORAL EVERY MORNING
Qty: 90 TABLET | Refills: 3 | Status: SHIPPED | OUTPATIENT
Start: 2020-07-20 | End: 2021-04-20 | Stop reason: SDUPTHER

## 2020-07-20 SDOH — ECONOMIC STABILITY: TRANSPORTATION INSECURITY
IN THE PAST 12 MONTHS, HAS THE LACK OF TRANSPORTATION KEPT YOU FROM MEDICAL APPOINTMENTS OR FROM GETTING MEDICATIONS?: NO

## 2020-07-20 SDOH — ECONOMIC STABILITY: FOOD INSECURITY: WITHIN THE PAST 12 MONTHS, YOU WORRIED THAT YOUR FOOD WOULD RUN OUT BEFORE YOU GOT MONEY TO BUY MORE.: NEVER TRUE

## 2020-07-20 SDOH — ECONOMIC STABILITY: INCOME INSECURITY: HOW HARD IS IT FOR YOU TO PAY FOR THE VERY BASICS LIKE FOOD, HOUSING, MEDICAL CARE, AND HEATING?: NOT HARD AT ALL

## 2020-07-20 SDOH — ECONOMIC STABILITY: TRANSPORTATION INSECURITY
IN THE PAST 12 MONTHS, HAS LACK OF TRANSPORTATION KEPT YOU FROM MEETINGS, WORK, OR FROM GETTING THINGS NEEDED FOR DAILY LIVING?: NO

## 2020-07-20 SDOH — ECONOMIC STABILITY: FOOD INSECURITY: WITHIN THE PAST 12 MONTHS, THE FOOD YOU BOUGHT JUST DIDN'T LAST AND YOU DIDN'T HAVE MONEY TO GET MORE.: NEVER TRUE

## 2020-07-20 ASSESSMENT — PATIENT HEALTH QUESTIONNAIRE - PHQ9
SUM OF ALL RESPONSES TO PHQ9 QUESTIONS 1 & 2: 0
SUM OF ALL RESPONSES TO PHQ QUESTIONS 1-9: 0
SUM OF ALL RESPONSES TO PHQ QUESTIONS 1-9: 0
2. FEELING DOWN, DEPRESSED OR HOPELESS: 0
1. LITTLE INTEREST OR PLEASURE IN DOING THINGS: 0

## 2020-07-20 NOTE — PATIENT INSTRUCTIONS
Patient Education        pneumococcal polysaccharides vaccine (PPSV), 23-valent  Pronunciation:  SARA MCDUFFIE al TURNER ee KATHERINE penaloza 23-FRANKI dorman  Brand:  Pneumovax 23  What is the most important information I should know about this vaccine? PPSV should be given at least 2 weeks before the start of any treatment that can weaken your immune system. PPSV is also given at least 2 weeks before you undergo a splenectomy (surgical removal of the spleen). The timing of this vaccination is very important for it to be effective. Follow your doctor's instructions. You can still receive a vaccine if you have a cold or fever. In the case of a more severe illness with a fever or any type of infection, wait until you get better before receiving this vaccine. You should not receive a booster vaccine if you had a life-threatening allergic reaction after the first shot. Keep track of any and all side effects you have after receiving this vaccine. If you ever need to receive a booster dose, you will need to tell your doctor if the previous shot caused any side effects. Becoming infected with pneumococcal disease (such as pneumonia or meningitis) is much more dangerous to your health than receiving this vaccine. However, like any medicine, this vaccine can cause side effects but the risk of serious side effects is extremely low. What is pneumococcal polysaccharides vaccine (PPSV)? Pneumococcal disease is a serious infection caused by a bacteria. Pneumococcal bacteria can infect the sinuses and inner ear. It can also infect the lungs, blood, and brain and these conditions can be fatal.  Pneumococcal polysaccharides vaccine (PPSV) is used to prevent infection caused by pneumococcal bacteria. PPSV contains 23 of the most common types of pneumococcal bacteria. PPSV works by exposing you to a small dose of the bacteria or a protein from the bacteria, which causes your body to develop immunity to the disease.  PPSV will not treat an known whether PPSV passes into breast milk or if it could harm a nursing baby. Do not use this medication without telling your doctor if you are breast-feeding a baby. How is this vaccine given? PPSV is given as an injection (shot) under the skin or into a muscle of your arm or thigh. You will receive this injection in a doctor's office or other clinic setting. PPSV is usually given as a routine vaccination in adults who are 72 years and older. PPSV may also be given to people between the ages 3and 59years old who have:  · heart disease, lung disease, or diabetes;  · a cerebrospinal fluid leak, or a cochlear implant (an electronic hearing device);  · alcoholism or liver disease (including cirrhosis);  · sickle cell disease or a disorder of the spleen;  · a weak immune system caused by HIV, AIDS, cancer, kidney failure, organ transplantation, or a damaged spleen; or  · a weak immune system caused by taking steroids or receiving chemotherapy or radiation treatment. PPSV may also be given to people between the ages 23and 59years old who smoke or have asthma. PPSV should be given at least 2 weeks before the start of any treatment that can weaken your immune system. PPSV is also given at least 2 weeks before you undergo a splenectomy (surgical removal of the spleen). The timing of this vaccination is very important for it to be effective. Follow your doctor's instructions. Your doctor may recommend treating fever and pain with an aspirin-free pain reliever such as acetaminophen (Tylenol) or ibuprofen (Motrin, Advil, and others) when the shot is given and for the next 24 hours. Follow the label directions or your doctor's instructions about how much of this medicine to take. If your doctor has prescribed an antibiotic (such as penicillin) to help prevent infection with pneumococcal bacteria, do not stop using the antibiotic after you receive the PPSV.  Take the antibiotic for the entire length of time prescribed by your doctor. Most people receive only one PPSV shot during their lifetime. However, people in certain age groups or with certain disease conditions that put them at risk of infection may need to receive more than one vaccine. Before receiving this vaccine, tell your doctor if you have received a pneumococcal vaccine within the past 3 to 5 years. What happens if I miss a dose? Since PPSV is usually given only one time, you will most likely not be on a dosing schedule. If you are receiving a repeat PPSV shot, be sure to tell your doctor if it has been less than 5 years since you last received a pneumococcal vaccine. What happens if I overdose? An overdose of this vaccine is not likely to occur. What should I avoid before or after receiving this vaccine ? Follow your doctor's instructions about any restrictions on food, beverages, or activity. What are the possible side effects of this vaccine? You should not receive a booster vaccine if you had a life-threatening allergic reaction after the first shot. Keep track of any and all side effects you have after receiving this vaccine. If you ever need to receive a booster dose, you will need to tell your doctor if the previous shot caused any side effects. Becoming infected with pneumococcal disease (such as pneumonia or meningitis) is much more dangerous to your health than receiving this vaccine. However, like any medicine, this vaccine can cause side effects but the risk of serious side effects is extremely low. Get emergency medical help if you have any of these signs of an allergic reaction: hives; difficulty breathing; swelling of your face, lips, tongue, or throat.   Call your doctor at once if you have a serious side effect such as:  · high fever (103 degrees or higher);  · easy bruising or bleeding;  · swollen glands with skin rash or itching, joint pain, and general ill feeling;  · pale or yellowed skin, dark colored urine, confusion or weakness;  · numbness or tingly feeling in your feet and spreading upward, severe lower back pain;  · changes in behavior, problems with vision, speech, swallowing, or bladder and bowel functions; or  · slow heart rate, trouble breathing, feeling like you might pass out. Less serious side effects are more likely to occur, such as:  · low fever (102 degrees or less), chills, tired feeling;  · swelling, pain, tenderness, or redness anywhere on your body;  · headache, nausea, vomiting;  · joint or muscle pain;  · swelling or stiffness in the arm or leg the vaccine was injected into;  · mild skin rash; or  · mild soreness, warmth, redness, swelling, or a hard lump where the shot was given. This is not a complete list of side effects and others may occur. Call your doctor for medical advice about side effects. You may report vaccine side effects to the [address] [place] [address] and Human Services at [phone]. What other drugs will affect pneumococcal polysaccharides vaccine (PPSV)? Before receiving this vaccine, tell the doctor about all other vaccines you have recently received. Also tell the doctor if you have recently received drugs or treatments that can weaken the immune system, including:  · an oral, nasal, inhaled, or injectable steroid medicine;  · medications to treat psoriasis, rheumatoid arthritis, or other autoimmune disorders, such as azathioprine (Imuran), etanercept (Enbrel), leflunomide ([address]), and others; or  · medicines to treat or prevent organ transplant rejection, such as basiliximab (Simulect), cyclosporine (Sandimmune, Neoral, Gengraf), muromonab-CD3 (Orthoclone), mycophenolate mofetil (CellCept), sirolimus (Rapamune), or tacrolimus (Prograf). If you are using any of these medications, you may not be able to receive the vaccine, or may need to wait until the other treatments are finished. This list is not complete and other drugs may interact with PPSV.  Tell your doctor about all medications you use. This includes prescription, over-the-counter, vitamin, and herbal products. Do not start a new medication without telling your doctor. Where can I get more information? Your doctor or pharmacist may have additional information about pneumococcal polysaccharides vaccine. You may also find additional information from your local health department or the Centers for Disease Control and Prevention. Remember, keep this and all other medicines out of the reach of children, never share your medicines with others, and use this medication only for the indication prescribed. Every effort has been made to ensure that the information provided by [address] is accurate, up-to-date, and complete, but no guarantee is made to that effect. Drug information contained herein may be time sensitive. [facility] information has been compiled for use by healthcare practitioners and consumers in the [place] and therefore [facility] does not warrant that uses outside of the [place] are appropriate, unless specifically indicated otherwise. [facility]'s drug information does not endorse drugs, diagnose patients or recommend therapy. [facility] drug information is an informational resource designed to assist licensed healthcare practitioners in caring for their patients and/or to serve consumers viewing this service as a supplement to, and not a substitute for, the expertise, skill, knowledge and judgment of healthcare practitioners. The absence of a warning for a given drug or drug combination in no way should be construed to indicate that the drug or drug combination is safe, effective or appropriate for any given patient. [facility] does not assume any responsibility for any aspect of healthcare administered with the aid of information [facility] provides.  The information contained herein is not intended to cover all possible uses, directions, precautions, warnings, drug interactions, allergic reactions, or adverse effects. If you have questions about the drugs you are taking, check with your doctor, nurse or pharmacist.  Copyright 0459-8239 [facility] [place]. Version: 5.02. Revision date: 10/17/2012. Care instructions adapted under license by [facility] ([facility]). If you have questions about a medical condition or this instruction, always ask your healthcare professional. [place] [address] any warranty or liability for your use of this information.

## 2020-07-20 NOTE — PROGRESS NOTES
Immunizations Administered     Name Date Dose Route    Pneumococcal Polysaccharide ([id number]) 7/20/2020 0.5 mL Intramuscular    Site: Deltoid- Left    Lot: [id number]    NDC: 4559-2979-21

## 2020-07-22 ASSESSMENT — ENCOUNTER SYMPTOMS
NAUSEA: 0
SORE THROAT: 0
COUGH: 0
CONSTIPATION: 0
WHEEZING: 0
DIARRHEA: 0
SHORTNESS OF BREATH: 0
VOMITING: 0
ABDOMINAL PAIN: 0
BACK PAIN: 0
RHINORRHEA: 0

## 2020-07-22 NOTE — PROGRESS NOTES
[address] [facility] [address]  Dept: [phone]  Dept Fax: [phone]  Loc: [phone]  PROGRESS NOTE      VisitDate: 7/20/2020    Radha Barker is a 64 y.o. male who presents today for:     Chief Complaint   Patient presents with    Annual Exam     DM, HTN, Hyperlipidemia-does he need a shingrix shot?, form for G6    Arthritis     bilateral hand pain, mainly right side, some mild swelling in joints, family hx         Subjective:  HPI  Follow-up insulin-dependent type 1 diabetes. Patient is doing well labs reviewed with the patient. Follow-up hypertension, blood pressures remained stable. Follow-up hyperlipidemia cholesterol is well controlled. Is been monitoring his blood sugars closely with his wearable device which has improved his glucose control and help him avoid hypoglycemic episodes    Review of Systems   Constitutional: Negative for chills, fatigue and fever. HENT: Negative for congestion, rhinorrhea and sore throat. Respiratory: Negative for cough, shortness of breath and wheezing. Cardiovascular: Negative for chest pain, palpitations and leg swelling. Gastrointestinal: Negative for abdominal pain, constipation, diarrhea, nausea and vomiting. Genitourinary: Negative for dysuria, frequency and urgency. Musculoskeletal: Negative for arthralgias, back pain and joint swelling. Skin: Negative for rash. Neurological: Negative for dizziness, light-headedness, numbness and headaches. Past Medical History:   Diagnosis Date    Hyperlipidemia     Hypertension     Type 1 diabetes mellitus with hypoglycemia without coma [facility])       Past Surgical History:   Procedure Laterality Date    COLONOSCOPY  2/11/13         No family history on file.   Social History     Tobacco Use    Smoking status: Never Smoker    Smokeless tobacco: Never Used   Substance Use Topics    Alcohol use: Yes     Comment: Completed    Hepatitis A vaccine  Aged Out    Hib vaccine  Aged Out    Meningococcal (ACWY) vaccine  Aged Out         Objective:     Physical Exam  Vitals signs reviewed. Constitutional:       General: He is not in acute distress. HENT:      Mouth/Throat:      Pharynx: No oropharyngeal exudate. Eyes:      Conjunctiva/sclera: Conjunctivae normal.   Neck:      Thyroid: No thyromegaly. Comments: No carotid bruits  Cardiovascular:      Rate and Rhythm: Normal rate and regular rhythm. Heart sounds: Normal heart sounds. No murmur. Pulmonary:      Breath sounds: Normal breath sounds. No wheezing or rales. Abdominal:      General: Bowel sounds are normal. There is no distension. Palpations: Abdomen is soft. There is no mass. Tenderness: There is no abdominal tenderness. There is no guarding or rebound. Musculoskeletal: Normal range of motion. General: No tenderness. Lymphadenopathy:      Cervical: No cervical adenopathy. Skin:     General: Skin is dry. Findings: No rash. Neurological:      Mental Status: He is alert and oriented to person, place, and time. Cranial Nerves: No cranial nerve deficit. Deep Tendon Reflexes: Reflexes are normal and symmetric. /78 (Site: Left Upper Arm)   Pulse 84   Temp 98.3 °F (36.8 °C) (Temporal)   Resp 16   Ht 5' 9\" (1.753 m)   Wt 208 lb 6.4 oz (94.5 kg)   BMI 30.78 kg/m²       Impression/Plan:  1. Type 1 diabetes mellitus with hypoglycemia and without coma ([facility] [address]    2. Hyperlipidemia, unspecified hyperlipidemia type    3.  Essential hypertension      Requested Prescriptions     Signed Prescriptions Disp Refills    simvastatin (ZOCOR) 40 MG tablet 90 tablet 3     Sig: take 1 tablet by mouth every evening    losartan (COZAAR) 100 MG tablet 90 tablet 3     Sig: take 1 tablet by mouth once daily    hydroCHLOROthiazide (HYDRODIURIL) 25 MG tablet 90 tablet 3     Sig: Take 1 tablet by mouth every morning     Orders Placed This Encounter   Procedures    Pneumococcal polysaccharide vaccine 23-valent greater than or equal to 1yo subcutaneous/IM    Lipid Panel     Standing Status:   Future     Standing Expiration Date:   7/20/2021     Order Specific Question:   Is Patient Fasting?/# of Hours     Answer:   yes/12    Hemoglobin A1C     Standing Status:   Future     Standing Expiration Date:   7/20/2021    Comprehensive Metabolic Panel     Standing Status:   Future     Standing Expiration Date:   7/20/2021       Patient giveneducational materials - see patient instructions. Discussed use, benefit, and side effects of prescribed medications. All patient questions answered. Pt voiced understanding. Reviewed health maintenance. Patient agreedwith treatment plan. Follow up as directed. **This report has been created using voice recognition software. It may contain minor errorswhich are inherent in voice recognition technology. **       Electronically signed by Donna Jeffery MD on 7/22/2020 at 7:39 AM

## 2020-08-12 RX ORDER — INSULIN ASPART 100 [IU]/ML
INJECTION, SOLUTION INTRAVENOUS; SUBCUTANEOUS
Qty: 30 ML | Refills: 5 | Status: SHIPPED | OUTPATIENT
Start: 2020-08-12 | End: 2021-03-29

## 2020-10-03 ENCOUNTER — HOSPITAL ENCOUNTER (OUTPATIENT)
Age: 60
Discharge: HOME | End: 2020-10-03
Payer: COMMERCIAL

## 2020-10-03 DIAGNOSIS — E78.5 HYPERLIPIDEMIA, UNSPECIFIED HYPERLIPIDEMIA TYPE: ICD-10-CM

## 2020-10-03 DIAGNOSIS — E10.649 TYPE 1 DIABETES MELLITUS WITH HYPOGLYCEMIA AND WITHOUT COMA (HCC): ICD-10-CM

## 2020-10-03 LAB
ALBUMIN SERPL-MCNC: 4.8 G/DL (ref 3.5–5.1)
ALP BLD-CCNC: 71 U/L (ref 38–126)
ALT SERPL-CCNC: 10 U/L (ref 11–66)
ANION GAP SERPL CALCULATED.3IONS-SCNC: 8 MEQ/L (ref 8–16)
AST SERPL-CCNC: 18 U/L (ref 5–40)
AVERAGE GLUCOSE: 129 MG/DL (ref 70–126)
BILIRUB SERPL-MCNC: 1.6 MG/DL (ref 0.3–1.2)
BUN BLDV-MCNC: 14 MG/DL (ref 7–22)
CALCIUM SERPL-MCNC: 9.7 MG/DL (ref 8.5–10.5)
CHLORIDE BLD-SCNC: 100 MEQ/L (ref 98–111)
CHOLESTEROL, TOTAL: 137 MG/DL (ref 100–199)
CO2: 25 MEQ/L (ref 23–33)
CREAT SERPL-MCNC: 0.7 MG/DL (ref 0.4–1.2)
GFR SERPL CREATININE-BSD FRML MDRD: > 90 ML/MIN/1.73M2
GLUCOSE BLD-MCNC: 96 MG/DL (ref 70–108)
HBA1C MFR BLD: 6.3 % (ref 4.4–6.4)
HDLC SERPL-MCNC: 59 MG/DL
LDL CHOLESTEROL CALCULATED: 68 MG/DL
POTASSIUM SERPL-SCNC: 4.3 MEQ/L (ref 3.5–5.2)
SODIUM BLD-SCNC: 133 MEQ/L (ref 135–145)
TOTAL PROTEIN: 7.2 G/DL (ref 6.1–8)
TRIGL SERPL-MCNC: 48 MG/DL (ref 0–199)

## 2020-10-03 PROCEDURE — 80061 LIPID PANEL: CPT

## 2020-10-03 PROCEDURE — 36415 COLL VENOUS BLD VENIPUNCTURE: CPT

## 2020-10-03 PROCEDURE — 83036 HEMOGLOBIN GLYCOSYLATED A1C: CPT

## 2020-10-03 PROCEDURE — 80053 COMPREHEN METABOLIC PANEL: CPT

## 2021-01-12 RX ORDER — INSULIN GLARGINE 100 [IU]/ML
INJECTION, SOLUTION SUBCUTANEOUS
Qty: 45 ML | Refills: 1 | Status: SHIPPED | OUTPATIENT
Start: 2021-01-12 | End: 2021-04-20 | Stop reason: SDUPTHER

## 2021-03-25 ENCOUNTER — TELEPHONE (OUTPATIENT)
Dept: FAMILY MEDICINE CLINIC | Age: 61
End: 2021-03-25

## 2021-03-25 DIAGNOSIS — Z12.5 SCREENING FOR PROSTATE CANCER: ICD-10-CM

## 2021-03-25 DIAGNOSIS — Z79.4 CONTROLLED TYPE 2 DIABETES MELLITUS WITHOUT COMPLICATION, WITH LONG-TERM CURRENT USE OF INSULIN (HCC): ICD-10-CM

## 2021-03-25 DIAGNOSIS — E78.5 HYPERLIPIDEMIA, UNSPECIFIED HYPERLIPIDEMIA TYPE: ICD-10-CM

## 2021-03-25 DIAGNOSIS — E10.649 TYPE 1 DIABETES MELLITUS WITH HYPOGLYCEMIA AND WITHOUT COMA (HCC): Primary | ICD-10-CM

## 2021-03-25 DIAGNOSIS — E11.9 CONTROLLED TYPE 2 DIABETES MELLITUS WITHOUT COMPLICATION, WITH LONG-TERM CURRENT USE OF INSULIN (HCC): ICD-10-CM

## 2021-03-25 DIAGNOSIS — I10 ESSENTIAL HYPERTENSION: ICD-10-CM

## 2021-03-25 NOTE — TELEPHONE ENCOUNTER
Has routine appt with you 4-15-21, requesting labs be done prior. Had Lipid, A1c, CMP on 10-3-20. Last PSA and microalbubin were 2-12-20.       Will do at [facility], no need to call pt back

## 2021-03-29 RX ORDER — INSULIN ASPART 100 [IU]/ML
INJECTION, SOLUTION INTRAVENOUS; SUBCUTANEOUS
Qty: 30 ML | Refills: 5 | Status: SHIPPED | OUTPATIENT
Start: 2021-03-29 | End: 2021-10-20 | Stop reason: SDUPTHER

## 2021-04-10 ENCOUNTER — HOSPITAL ENCOUNTER (OUTPATIENT)
Age: 61
Discharge: HOME | End: 2021-04-10
Payer: COMMERCIAL

## 2021-04-10 DIAGNOSIS — Z79.4 CONTROLLED TYPE 2 DIABETES MELLITUS WITHOUT COMPLICATION, WITH LONG-TERM CURRENT USE OF INSULIN (HCC): ICD-10-CM

## 2021-04-10 DIAGNOSIS — E11.9 CONTROLLED TYPE 2 DIABETES MELLITUS WITHOUT COMPLICATION, WITH LONG-TERM CURRENT USE OF INSULIN (HCC): ICD-10-CM

## 2021-04-10 DIAGNOSIS — I10 ESSENTIAL HYPERTENSION: ICD-10-CM

## 2021-04-10 DIAGNOSIS — E78.5 HYPERLIPIDEMIA, UNSPECIFIED HYPERLIPIDEMIA TYPE: ICD-10-CM

## 2021-04-10 DIAGNOSIS — Z12.5 SCREENING FOR PROSTATE CANCER: ICD-10-CM

## 2021-04-10 LAB
ALBUMIN SERPL-MCNC: 4.8 G/DL (ref 3.5–5.1)
ALP BLD-CCNC: 63 U/L (ref 38–126)
ALT SERPL-CCNC: 12 U/L (ref 11–66)
ANION GAP SERPL CALCULATED.3IONS-SCNC: 9 MEQ/L (ref 8–16)
AST SERPL-CCNC: 24 U/L (ref 5–40)
AVERAGE GLUCOSE: 132 MG/DL (ref 70–126)
BASOPHILS # BLD: 0.8 %
BASOPHILS ABSOLUTE: 0 THOU/MM3 (ref 0–0.1)
BILIRUB SERPL-MCNC: 1.7 MG/DL (ref 0.3–1.2)
BUN BLDV-MCNC: 14 MG/DL (ref 7–22)
CALCIUM SERPL-MCNC: 10.1 MG/DL (ref 8.5–10.5)
CHLORIDE BLD-SCNC: 101 MEQ/L (ref 98–111)
CHOLESTEROL, TOTAL: 176 MG/DL (ref 100–199)
CO2: 27 MEQ/L (ref 23–33)
CREAT SERPL-MCNC: 0.8 MG/DL (ref 0.4–1.2)
CREATININE, URINE: 70.9 MG/DL
EOSINOPHIL # BLD: 3.3 %
EOSINOPHILS ABSOLUTE: 0.2 THOU/MM3 (ref 0–0.4)
ERYTHROCYTE [DISTWIDTH] IN BLOOD BY AUTOMATED COUNT: 12.8 % (ref 11.5–14.5)
ERYTHROCYTE [DISTWIDTH] IN BLOOD BY AUTOMATED COUNT: 43.6 FL (ref 35–45)
GFR SERPL CREATININE-BSD FRML MDRD: > 90 ML/MIN/1.73M2
GLUCOSE BLD-MCNC: 65 MG/DL (ref 70–108)
HBA1C MFR BLD: 6.4 % (ref 4.4–6.4)
HCT VFR BLD CALC: 45.1 % (ref 42–52)
HDLC SERPL-MCNC: 63 MG/DL
HEMOGLOBIN: 15 GM/DL (ref 14–18)
IMMATURE GRANS (ABS): 0.01 THOU/MM3 (ref 0–0.07)
IMMATURE GRANULOCYTES: 0.2 %
LDL CHOLESTEROL CALCULATED: 98 MG/DL
LYMPHOCYTES # BLD: 19.5 %
LYMPHOCYTES ABSOLUTE: 1 THOU/MM3 (ref 1–4.8)
MCH RBC QN AUTO: 30.8 PG (ref 26–33)
MCHC RBC AUTO-ENTMCNC: 33.3 GM/DL (ref 32.2–35.5)
MCV RBC AUTO: 92.6 FL (ref 80–94)
MICROALBUMIN UR-MCNC: < 1.2 MG/DL
MICROALBUMIN/CREAT UR-RTO: 17 MG/G (ref 0–30)
MONOCYTES # BLD: 16.4 %
MONOCYTES ABSOLUTE: 0.8 THOU/MM3 (ref 0.4–1.3)
NUCLEATED RED BLOOD CELLS: 0 /100 WBC
PLATELET # BLD: 234 THOU/MM3 (ref 130–400)
PMV BLD AUTO: 9.9 FL (ref 9.4–12.4)
POTASSIUM SERPL-SCNC: 3.9 MEQ/L (ref 3.5–5.2)
PROSTATE SPECIFIC ANTIGEN: 3.67 NG/ML (ref 0–1)
RBC # BLD: 4.87 MILL/MM3 (ref 4.7–6.1)
SEG NEUTROPHILS: 59.8 %
SEGMENTED NEUTROPHILS ABSOLUTE COUNT: 3 THOU/MM3 (ref 1.8–7.7)
SODIUM BLD-SCNC: 137 MEQ/L (ref 135–145)
TOTAL PROTEIN: 7.4 G/DL (ref 6.1–8)
TRIGL SERPL-MCNC: 75 MG/DL (ref 0–199)
WBC # BLD: 5.1 THOU/MM3 (ref 4.8–10.8)

## 2021-04-10 PROCEDURE — 80053 COMPREHEN METABOLIC PANEL: CPT

## 2021-04-10 PROCEDURE — 85025 COMPLETE CBC W/AUTO DIFF WBC: CPT

## 2021-04-10 PROCEDURE — 84153 ASSAY OF PSA TOTAL: CPT

## 2021-04-10 PROCEDURE — 83036 HEMOGLOBIN GLYCOSYLATED A1C: CPT

## 2021-04-10 PROCEDURE — 82043 UR ALBUMIN QUANTITATIVE: CPT

## 2021-04-10 PROCEDURE — 80061 LIPID PANEL: CPT

## 2021-04-10 PROCEDURE — 36415 COLL VENOUS BLD VENIPUNCTURE: CPT

## 2021-04-20 ENCOUNTER — OFFICE VISIT (OUTPATIENT)
Dept: FAMILY MEDICINE CLINIC | Age: 61
End: 2021-04-20
Payer: COMMERCIAL

## 2021-04-20 VITALS
SYSTOLIC BLOOD PRESSURE: 120 MMHG | OXYGEN SATURATION: 98 % | TEMPERATURE: 98.3 F | HEART RATE: 73 BPM | WEIGHT: 208.8 LBS | BODY MASS INDEX: 30.83 KG/M2 | RESPIRATION RATE: 16 BRPM | DIASTOLIC BLOOD PRESSURE: 78 MMHG

## 2021-04-20 DIAGNOSIS — E78.5 HYPERLIPIDEMIA, UNSPECIFIED HYPERLIPIDEMIA TYPE: ICD-10-CM

## 2021-04-20 DIAGNOSIS — E10.649 TYPE 1 DIABETES MELLITUS WITH HYPOGLYCEMIA AND WITHOUT COMA (HCC): ICD-10-CM

## 2021-04-20 DIAGNOSIS — Z00.00 ROUTINE GENERAL MEDICAL EXAMINATION AT A HEALTH CARE FACILITY: Primary | ICD-10-CM

## 2021-04-20 DIAGNOSIS — I10 ESSENTIAL HYPERTENSION: Chronic | ICD-10-CM

## 2021-04-20 DIAGNOSIS — Z12.5 SCREENING PSA (PROSTATE SPECIFIC ANTIGEN): ICD-10-CM

## 2021-04-20 PROCEDURE — 99396 PREV VISIT EST AGE 40-64: CPT | Performed by: FAMILY MEDICINE

## 2021-04-20 RX ORDER — GLUCOSAMINE HCL/CHONDROITIN SU 500-400 MG
CAPSULE ORAL
Qty: 100 STRIP | Refills: 3 | Status: SHIPPED | OUTPATIENT
Start: 2021-04-20 | End: 2022-06-24 | Stop reason: SDUPTHER

## 2021-04-20 RX ORDER — AMLODIPINE BESYLATE 10 MG/1
TABLET ORAL
Qty: 90 TABLET | Refills: 3 | Status: SHIPPED | OUTPATIENT
Start: 2021-04-20 | End: 2022-05-09

## 2021-04-20 RX ORDER — LOSARTAN POTASSIUM 100 MG/1
TABLET ORAL
Qty: 90 TABLET | Refills: 3 | Status: SHIPPED | OUTPATIENT
Start: 2021-04-20 | End: 2022-05-09

## 2021-04-20 RX ORDER — INSULIN GLARGINE 100 [IU]/ML
INJECTION, SOLUTION SUBCUTANEOUS
Qty: 45 ML | Refills: 1 | Status: SHIPPED | OUTPATIENT
Start: 2021-04-20 | End: 2021-10-20 | Stop reason: SDUPTHER

## 2021-04-20 RX ORDER — SIMVASTATIN 40 MG
TABLET ORAL
Qty: 90 TABLET | Refills: 3 | Status: SHIPPED | OUTPATIENT
Start: 2021-04-20 | End: 2022-04-25

## 2021-04-20 RX ORDER — HYDROCHLOROTHIAZIDE 25 MG/1
25 TABLET ORAL EVERY MORNING
Qty: 90 TABLET | Refills: 3 | Status: SHIPPED | OUTPATIENT
Start: 2021-04-20 | End: 2022-06-24 | Stop reason: SDUPTHER

## 2021-04-20 ASSESSMENT — PATIENT HEALTH QUESTIONNAIRE - PHQ9
2. FEELING DOWN, DEPRESSED OR HOPELESS: 0
SUM OF ALL RESPONSES TO PHQ QUESTIONS 1-9: 0
1. LITTLE INTEREST OR PLEASURE IN DOING THINGS: 0

## 2021-04-25 ASSESSMENT — ENCOUNTER SYMPTOMS
WHEEZING: 0
COUGH: 0
SHORTNESS OF BREATH: 0
RHINORRHEA: 0
SORE THROAT: 0
DIARRHEA: 0
CONSTIPATION: 0
EYE PAIN: 0
VOMITING: 0
NAUSEA: 0
ABDOMINAL DISTENTION: 0
BACK PAIN: 0
ABDOMINAL PAIN: 0
SINUS PRESSURE: 0

## 2021-04-25 NOTE — PROGRESS NOTES
[place] [address]  Dept: [phone]  Dept Fax: [phone]  Loc: [phone]  PROGRESS NOTE      VisitDate: 4/20/2021    Aries Mcmahan is a 64 y.o. male who presents today for:     Chief Complaint   Patient presents with    Annual Exam     HTN, DM, hyperlipidemia, discuss labs, due for foot exam         Subjective:  HPI  Patient comes in for annual wellness follow-up diabetes hypertension hyperlipidemia. All are well controlled, labs reviewed with the patient. He has no issues or concerns    Review of Systems   Constitutional: Negative for appetite change, chills, fatigue and fever. HENT: Negative for congestion, ear pain, postnasal drip, rhinorrhea, sinus pressure and sore throat. Eyes: Negative for pain and visual disturbance. Respiratory: Negative for cough, shortness of breath and wheezing. Cardiovascular: Negative for chest pain, palpitations and leg swelling. Gastrointestinal: Negative for abdominal distention, abdominal pain, constipation, diarrhea, nausea and vomiting. Genitourinary: Negative for dysuria, frequency and urgency. Musculoskeletal: Negative for arthralgias, back pain and joint swelling. Skin: Negative for rash. Neurological: Negative for dizziness, light-headedness, numbness and headaches. Past Medical History:   Diagnosis Date    Hyperlipidemia     Hypertension     Type 1 diabetes mellitus with hypoglycemia without coma [facility])       Past Surgical History:   Procedure Laterality Date    COLONOSCOPY  2/11/13         History reviewed. No pertinent family history.   Social History     Tobacco Use    Smoking status: Never Smoker    Smokeless tobacco: Never Used   Substance Use Topics    Alcohol use: Yes     Comment: occas      Current Outpatient Medications   Medication Sig Dispense Refill    simvastatin (ZOCOR) 40 MG tablet take 1 tablet by mouth every evening 90 tablet 3    losartan (COZAAR) 100 MG tablet take 1 tablet by mouth once daily 90 tablet 3    hydroCHLOROthiazide (HYDRODIURIL) 25 MG tablet Take 1 tablet by mouth every morning 90 tablet 3    amLODIPine (NORVASC) 10 MG tablet take 1 tablet by mouth once daily 90 tablet 3    insulin glargine (LANTUS SOLOSTAR) 100 UNIT/ML injection pen INJECT 22 UNITS INTO THE SKIN NIGHTLY 45 mL 1    blood glucose monitor strips Test 1time a day & as needed for symptoms of irregular blood glucose. Dispense sufficient amount for indicated testing frequency plus additional to accommodate PRN testing needs. 100 strip 3    NOVOLOG FLEXPEN 100 UNIT/ML injection pen INJECT 0 TO 30 UNITS SUBCUTEANOUSLY THREE TIMES A DAY PER SLIDING SCALE. 30 mL 5    blood glucose test strips (ONE TOUCH ULTRA TEST) strip USE TO TEST BLOOD SUGAR 8 TIMES DAILY. 800 strip 3    Continuous Blood Gluc Sensor (DEXCOM G6 SENSOR) MISC Change Sensor every 7-10 days Diagnosis:E11.9 Diagnosis:E11.9 12 each 3    Continuous Blood Gluc  (DEXCOM G6 ) LUDWIN Test blood sugar 8 times daily and as needed Diagnosis:E11.9 1 Device 0    Continuous Blood Gluc Transmit (DEXCOM G6 TRANSMITTER) MISC Change transmitter every 7-10 days Diagnosis:E11.9 12 each 3    aspirin 81 MG EC tablet Take 81 mg by mouth daily. No current facility-administered medications for this visit.       No Known Allergies  Health Maintenance   Topic Date Due    HIV screen  Never done    Hepatitis B vaccine (1 of 3 - Risk 3-dose series) Never done    DTaP/Tdap/Td vaccine (1 - Tdap) 07/08/1981    Shingles Vaccine (1 of 2) Never done    Diabetic foot exam  04/03/2018    Diabetic retinal exam  12/11/2019    A1C test (Diabetic or Prediabetic)  04/10/2022    Diabetic microalbuminuria test  04/10/2022    Lipid screen  04/10/2022    Potassium monitoring  04/10/2022    Creatinine monitoring  04/10/2022    Colon cancer screen colonoscopy  02/11/2023    Flu vaccine Completed    Pneumococcal 0-64 years Vaccine  Completed    COVID-19 Vaccine  Completed    Hepatitis C screen  Completed    Hepatitis A vaccine  Aged Out    Hib vaccine  Aged Out    Meningococcal (ACWY) vaccine  Aged Out         Objective:     Physical Exam  Constitutional:       General: He is not in acute distress. Appearance: He is well-developed. He is not diaphoretic. HENT:      Head: Normocephalic and atraumatic. Right Ear: External ear normal.      Left Ear: External ear normal.   Eyes:      Conjunctiva/sclera: Conjunctivae normal.   Neck:      Vascular: No JVD. Cardiovascular:      Rate and Rhythm: Normal rate and regular rhythm. Heart sounds: Normal heart sounds. Pulmonary:      Effort: Pulmonary effort is normal.      Breath sounds: Normal breath sounds. No wheezing or rales. Musculoskeletal:         General: No tenderness. Skin:     General: Skin is warm and dry. Coloration: Skin is not pale. Neurological:      Mental Status: He is alert and oriented to person, place, and time. Comments: Monofilament testing negative       /78 (Site: Right Upper Arm)   Pulse 73   Temp 98.3 °F (36.8 °C) (Oral)   Resp 16   Wt 208 lb 12.8 oz (94.7 kg)   SpO2 98%   BMI 30.83 kg/m²       Impression/Plan:  1. Routine general medical examination at a health care facility    2. Type 1 diabetes mellitus with hypoglycemia and without coma (HCC)    3. Screening PSA (prostate specific antigen)    4. Hyperlipidemia, unspecified hyperlipidemia type    5.  Essential hypertension      Requested Prescriptions     Signed Prescriptions Disp Refills    simvastatin (ZOCOR) 40 MG tablet 90 tablet 3     Sig: take 1 tablet by mouth every evening    losartan (COZAAR) 100 MG tablet 90 tablet 3     Sig: take 1 tablet by mouth once daily    hydroCHLOROthiazide (HYDRODIURIL) 25 MG tablet 90 tablet 3     Sig: Take 1 tablet by mouth every morning    amLODIPine (NORVASC) 10 MG tablet 90 tablet 3 Sig: take 1 tablet by mouth once daily    insulin glargine (LANTUS SOLOSTAR) 100 UNIT/ML injection pen 45 mL 1     Sig: INJECT 22 UNITS INTO THE SKIN NIGHTLY    blood glucose monitor strips 100 strip 3     Sig: Test 1time a day & as needed for symptoms of irregular blood glucose. Dispense sufficient amount for indicated testing frequency plus additional to accommodate PRN testing needs. Orders Placed This Encounter   Procedures    Hemoglobin A1C     Standing Status:   Future     Standing Expiration Date:   4/20/2022    Comprehensive Metabolic Panel     Standing Status:   Future     Standing Expiration Date:   4/20/2022    PSA screening     Standing Status:   Future     Standing Expiration Date:   4/20/2022    Lipid Panel     Standing Status:   Future     Standing Expiration Date:   4/20/2022     Order Specific Question:   Is Patient Fasting?/# of Hours     Answer:   yes/12     Seen today for wellness visit. Discussed the importance of a healthy life style. Balanced diet, nutrition, physical activity,and injury prevention. Also discussed the importance of up to date immunizations and annual screenings. Patient giveneducational materials - see patient instructions. Discussed use, benefit, and side effects of prescribed medications. All patient questions answered. Pt voiced understanding. Reviewed health maintenance. Patient agreedwith treatment plan. Follow up as directed. **This report has been created using voice recognition software. It may contain minor errorswhich are inherent in voice recognition technology. **       Electronically signed by Sinan Torres MD on 4/25/2021 at 7:54 AM

## 2021-09-25 ENCOUNTER — HOSPITAL ENCOUNTER (OUTPATIENT)
Age: 61
Discharge: HOME | End: 2021-09-25
Payer: COMMERCIAL

## 2021-09-25 DIAGNOSIS — E78.5 HYPERLIPIDEMIA, UNSPECIFIED HYPERLIPIDEMIA TYPE: ICD-10-CM

## 2021-09-25 DIAGNOSIS — E10.649 TYPE 1 DIABETES MELLITUS WITH HYPOGLYCEMIA AND WITHOUT COMA (HCC): ICD-10-CM

## 2021-09-25 DIAGNOSIS — Z12.5 SCREENING PSA (PROSTATE SPECIFIC ANTIGEN): ICD-10-CM

## 2021-09-25 LAB
ALBUMIN SERPL-MCNC: 5.3 G/DL (ref 3.5–5.1)
ALP BLD-CCNC: 77 U/L (ref 38–126)
ALT SERPL-CCNC: 12 U/L (ref 11–66)
ANION GAP SERPL CALCULATED.3IONS-SCNC: 13 MEQ/L (ref 8–16)
AST SERPL-CCNC: 23 U/L (ref 5–40)
AVERAGE GLUCOSE: 135 MG/DL (ref 70–126)
BILIRUB SERPL-MCNC: 1 MG/DL (ref 0.3–1.2)
BUN BLDV-MCNC: 11 MG/DL (ref 7–22)
CALCIUM SERPL-MCNC: 10.4 MG/DL (ref 8.5–10.5)
CHLORIDE BLD-SCNC: 101 MEQ/L (ref 98–111)
CHOLESTEROL, TOTAL: 150 MG/DL (ref 100–199)
CO2: 28 MEQ/L (ref 23–33)
CREAT SERPL-MCNC: 0.9 MG/DL (ref 0.4–1.2)
GFR SERPL CREATININE-BSD FRML MDRD: 86 ML/MIN/1.73M2
GLUCOSE BLD-MCNC: 108 MG/DL (ref 70–108)
HBA1C MFR BLD: 6.5 % (ref 4.4–6.4)
HDLC SERPL-MCNC: 60 MG/DL
LDL CHOLESTEROL CALCULATED: 75 MG/DL
POTASSIUM SERPL-SCNC: 4.6 MEQ/L (ref 3.5–5.2)
PROSTATE SPECIFIC ANTIGEN: 4.32 NG/ML (ref 0–1)
SODIUM BLD-SCNC: 142 MEQ/L (ref 135–145)
TOTAL PROTEIN: 7.7 G/DL (ref 6.1–8)
TRIGL SERPL-MCNC: 75 MG/DL (ref 0–199)

## 2021-09-25 PROCEDURE — 80061 LIPID PANEL: CPT

## 2021-09-25 PROCEDURE — 83036 HEMOGLOBIN GLYCOSYLATED A1C: CPT

## 2021-09-25 PROCEDURE — G0103 PSA SCREENING: HCPCS

## 2021-09-25 PROCEDURE — 36415 COLL VENOUS BLD VENIPUNCTURE: CPT

## 2021-09-25 PROCEDURE — 80053 COMPREHEN METABOLIC PANEL: CPT

## 2021-09-29 ENCOUNTER — TELEPHONE (OUTPATIENT)
Dept: FAMILY MEDICINE CLINIC | Age: 61
End: 2021-09-29

## 2021-09-29 DIAGNOSIS — R97.20 ELEVATED PSA: ICD-10-CM

## 2021-09-29 DIAGNOSIS — Z12.5 SCREENING PSA (PROSTATE SPECIFIC ANTIGEN): Primary | ICD-10-CM

## 2021-09-29 NOTE — TELEPHONE ENCOUNTER
----- Message from Fritzi Sacks sent at 9/29/2021 10:56 AM EDT -----  Subject: Message to Provider    QUESTIONS  Information for Provider? would like the results of his blood work  ---------------------------------------------------------------------------  --------------  [address]  What is the best way for the office to contact you? OK to leave message on   voicemail  Preferred Call Back Phone Number? [phone]  ---------------------------------------------------------------------------  --------------  SCRIPT ANSWERS  Relationship to Patient?  Self

## 2021-10-06 ENCOUNTER — OFFICE VISIT (OUTPATIENT)
Dept: UROLOGY | Age: 61
End: 2021-10-06
Payer: COMMERCIAL

## 2021-10-06 VITALS — HEIGHT: 70 IN | BODY MASS INDEX: 28.63 KG/M2 | RESPIRATION RATE: 16 BRPM | WEIGHT: 200 LBS

## 2021-10-06 DIAGNOSIS — R97.20 ELEVATED PSA: ICD-10-CM

## 2021-10-06 DIAGNOSIS — R35.1 NOCTURIA: Primary | ICD-10-CM

## 2021-10-06 LAB — POST VOID RESIDUAL (PVR): 0 ML

## 2021-10-06 PROCEDURE — 99204 OFFICE O/P NEW MOD 45 MIN: CPT | Performed by: UROLOGY

## 2021-10-06 PROCEDURE — 51798 US URINE CAPACITY MEASURE: CPT | Performed by: UROLOGY

## 2021-10-06 NOTE — PROGRESS NOTES
[place]        [address] [facility] [address]  Dept: [phone]  Dept Fax: [phone]: [address] [address] Urology Office Note -     Patient:  Carmen Bullock  YOB: 1960  Date: 10/6/2021    The patient is a 63 y.o. male who presents today for evaluation of the following problems:   Chief Complaint   Patient presents with    New Patient     elevated PSA. rising-4.32. denies family hx of prostate cancer. nocturia x1, weak stream, feeling  of  incomplete emptying of bladder, never tried any meds for urinary issiues. denies any utis     referred/consultation requested by Tracy Tsai MD.    HISTORY OF PRESENT ILLNESS:     Elevated PSA  Rising  No family hx      BPH  LUTS- irritative and obstructive symptoms  auass 14 not terribly bothered    Requested/reviewed records from Tracy Tsai MD office and/or outside physician/EMR    (Patient's old records have been requested, reviewed and pertinent findings summarized in today's note.)    Procedures Today: N/A      Last several PSA's:  Lab Results   Component Value Date    PSA 4.32 (H) 09/25/2021    PSA 3.67 (H) 04/10/2021    PSA 2.77 (H) 02/12/2020       Last total testosterone:  No results found for: TESTOSTERONE    Urinalysis today:  Results for POC orders placed in visit on 10/06/21   poct post void residual   Result Value Ref Range    post void residual 0 ml       Last BUN and creatinine:  Lab Results   Component Value Date    BUN 11 09/25/2021     Lab Results   Component Value Date    CREATININE 0.9 09/25/2021         Imaging Reviewed during this Office Visit:   Wanda Loza MD independently reviewed the images and verified the radiology reports from:    No results found.     PAST MEDICAL, FAMILY AND SOCIAL HISTORY:  Past Medical History:   Diagnosis Date    Hyperlipidemia     Hypertension     Type 1 diabetes mellitus with hypoglycemia without coma [facility])      Past Surgical History:   Procedure Laterality Date    COLONOSCOPY  2/11/13         No family history on file. Outpatient Medications Marked as Taking for the 10/6/21 encounter (Office Visit) with Franklyn Goodrich MD   Medication Sig Dispense Refill    simvastatin (ZOCOR) 40 MG tablet take 1 tablet by mouth every evening 90 tablet 3    losartan (COZAAR) 100 MG tablet take 1 tablet by mouth once daily 90 tablet 3    hydroCHLOROthiazide (HYDRODIURIL) 25 MG tablet Take 1 tablet by mouth every morning 90 tablet 3    amLODIPine (NORVASC) 10 MG tablet take 1 tablet by mouth once daily 90 tablet 3    insulin glargine (LANTUS SOLOSTAR) 100 UNIT/ML injection pen INJECT 22 UNITS INTO THE SKIN NIGHTLY 45 mL 1    blood glucose monitor strips Test 1time a day & as needed for symptoms of irregular blood glucose. Dispense sufficient amount for indicated testing frequency plus additional to accommodate PRN testing needs. 100 strip 3    NOVOLOG FLEXPEN 100 UNIT/ML injection pen INJECT 0 TO 30 UNITS SUBCUTEANOUSLY THREE TIMES A DAY PER SLIDING SCALE. 30 mL 5    blood glucose test strips (ONE TOUCH ULTRA TEST) strip USE TO TEST BLOOD SUGAR 8 TIMES DAILY. 800 strip 3    Continuous Blood Gluc Sensor (DEXCOM G6 SENSOR) MISC Change Sensor every 7-10 days Diagnosis:E11.9 Diagnosis:E11.9 12 each 3    Continuous Blood Gluc  (DEXCOM G6 ) LUDWIN Test blood sugar 8 times daily and as needed Diagnosis:E11.9 1 Device 0    Continuous Blood Gluc Transmit (DEXCOM G6 TRANSMITTER) MISC Change transmitter every 7-10 days Diagnosis:E11.9 12 each 3    aspirin 81 MG EC tablet Take 81 mg by mouth daily. Patient has no known allergies.   Social History     Tobacco Use   Smoking Status Never Smoker   Smokeless Tobacco Never Used      (If patient a smoker, smoking cessation counseling offered)   Social History     Substance and Sexual Activity   Alcohol Use Yes    Comment: occas REVIEW OF SYSTEMS:  Constitutional: negative  Eyes: negative  Respiratory: negative  Cardiovascular: negative  Gastrointestinal: negative  Genitourinary: see HPI  Musculoskeletal: negative  Skin: negative   Neurological: negative  Hematological/Lymphatic: negative  Psychological: negative      Physical Exam:    This a 63 y.o. male  Vitals:    10/06/21 1539   Resp: 16     Body mass index is 28.7 kg/m². Constitutional: Patient in no acute distress;       Assessment and Plan        1. Nocturia    2. Elevated PSA               Plan:      Elevated PSA- discussed recheck, prostate mri or biopsy. Get prostate MRI  BPH- not bothered enough to start meds at this time    Prescriptions Ordered:  No orders of the defined types were placed in this encounter.      Orders Placed:  Orders Placed This Encounter   Procedures    poct post void residual     Per bladder scan            HOLLIE Fernández MD

## 2021-10-07 ENCOUNTER — TELEPHONE (OUTPATIENT)
Dept: UROLOGY | Age: 61
End: 2021-10-07

## 2021-10-07 DIAGNOSIS — R97.20 ELEVATED PSA: Primary | ICD-10-CM

## 2021-10-20 ENCOUNTER — OFFICE VISIT (OUTPATIENT)
Dept: FAMILY MEDICINE CLINIC | Age: 61
End: 2021-10-20
Payer: COMMERCIAL

## 2021-10-20 VITALS
BODY MASS INDEX: 30.96 KG/M2 | WEIGHT: 209 LBS | RESPIRATION RATE: 20 BRPM | DIASTOLIC BLOOD PRESSURE: 72 MMHG | SYSTOLIC BLOOD PRESSURE: 110 MMHG | HEART RATE: 80 BPM | TEMPERATURE: 98.3 F | HEIGHT: 69 IN

## 2021-10-20 DIAGNOSIS — E10.649 TYPE 1 DIABETES MELLITUS WITH HYPOGLYCEMIA AND WITHOUT COMA (HCC): Primary | ICD-10-CM

## 2021-10-20 DIAGNOSIS — I10 ESSENTIAL HYPERTENSION: ICD-10-CM

## 2021-10-20 DIAGNOSIS — E78.5 HYPERLIPIDEMIA, UNSPECIFIED HYPERLIPIDEMIA TYPE: ICD-10-CM

## 2021-10-20 PROCEDURE — 90750 HZV VACC RECOMBINANT IM: CPT | Performed by: FAMILY MEDICINE

## 2021-10-20 PROCEDURE — 99214 OFFICE O/P EST MOD 30 MIN: CPT | Performed by: FAMILY MEDICINE

## 2021-10-20 PROCEDURE — 90472 IMMUNIZATION ADMIN EACH ADD: CPT | Performed by: FAMILY MEDICINE

## 2021-10-20 PROCEDURE — 90674 CCIIV4 VAC NO PRSV 0.5 ML IM: CPT | Performed by: FAMILY MEDICINE

## 2021-10-20 PROCEDURE — 90471 IMMUNIZATION ADMIN: CPT | Performed by: FAMILY MEDICINE

## 2021-10-20 RX ORDER — INSULIN ASPART 100 [IU]/ML
INJECTION, SOLUTION INTRAVENOUS; SUBCUTANEOUS
Qty: 30 ML | Refills: 5 | Status: SHIPPED | OUTPATIENT
Start: 2021-10-20 | End: 2022-07-05

## 2021-10-20 RX ORDER — INSULIN GLARGINE 100 [IU]/ML
INJECTION, SOLUTION SUBCUTANEOUS
Qty: 45 ML | Refills: 1 | Status: SHIPPED | OUTPATIENT
Start: 2021-10-20 | End: 2022-06-24 | Stop reason: SDUPTHER

## 2021-10-20 SDOH — ECONOMIC STABILITY: FOOD INSECURITY: WITHIN THE PAST 12 MONTHS, THE FOOD YOU BOUGHT JUST DIDN'T LAST AND YOU DIDN'T HAVE MONEY TO GET MORE.: NEVER TRUE

## 2021-10-20 SDOH — ECONOMIC STABILITY: FOOD INSECURITY: WITHIN THE PAST 12 MONTHS, YOU WORRIED THAT YOUR FOOD WOULD RUN OUT BEFORE YOU GOT MONEY TO BUY MORE.: NEVER TRUE

## 2021-10-20 ASSESSMENT — ENCOUNTER SYMPTOMS
NAUSEA: 0
BACK PAIN: 0
WHEEZING: 0
RHINORRHEA: 0
COUGH: 0
DIARRHEA: 0
COLOR CHANGE: 1
ABDOMINAL PAIN: 0
CONSTIPATION: 0
VOMITING: 0
SORE THROAT: 0
SHORTNESS OF BREATH: 0

## 2021-10-20 ASSESSMENT — SOCIAL DETERMINANTS OF HEALTH (SDOH): HOW HARD IS IT FOR YOU TO PAY FOR THE VERY BASICS LIKE FOOD, HOUSING, MEDICAL CARE, AND HEATING?: NOT HARD AT ALL

## 2021-10-20 NOTE — PROGRESS NOTES
[address] [address]  Dept: [phone]  Dept Fax: [phone]  Loc: [phone]  PROGRESS NOTE      VisitDate: 10/20/2021    Vitor Dillon is a 63 y.o. male who presents today for:     Chief Complaint   Patient presents with    6 Month Follow-Up     Type I DM, Hyperlipidemia, HTN.  Discuss Labs    Medication Refill    Immunizations     Flu and Shingrix         Subjective:  HPI  Follow-up diabetes, doing well sugars stable rare hypoglycemic episodes. Follow-up hypertension stable and well-controlled. He has noticed some ankle swelling. Follow-up hyperlipidemia stable well-controlled. Was noted to have elevated PSA levels, seen by urology has been scheduled for MRI in a couple weeks. Also noted small crusty area of skin on his right parietal scalp has not been changing per his report    Review of Systems   Constitutional: Negative for chills, fatigue and fever. HENT: Negative for congestion, rhinorrhea and sore throat. Respiratory: Negative for cough, shortness of breath and wheezing. Cardiovascular: Positive for leg swelling. Negative for chest pain and palpitations. Gastrointestinal: Negative for abdominal pain, constipation, diarrhea, nausea and vomiting. Genitourinary: Negative for dysuria, frequency and urgency. Musculoskeletal: Negative for arthralgias, back pain and joint swelling. Skin: Positive for color change. Negative for rash. Neurological: Negative for dizziness, light-headedness, numbness and headaches. Past Medical History:   Diagnosis Date    Hyperlipidemia     Hypertension     Type 1 diabetes mellitus with hypoglycemia without coma [facility])       Past Surgical History:   Procedure Laterality Date    COLONOSCOPY  2/11/13         History reviewed. No pertinent family history.   Social History     Tobacco Use    Smoking status: Never Smoker    Smokeless tobacco: Never Used   Substance Use Topics    Alcohol use: Yes     Comment: occas      Current Outpatient Medications   Medication Sig Dispense Refill    insulin aspart (NOVOLOG FLEXPEN) 100 UNIT/ML injection pen INJECT 0 TO 30 UNITS SUBCUTEANOUSLY THREE TIMES A DAY PER SLIDING SCALE. 30 mL 5    insulin glargine (LANTUS SOLOSTAR) 100 UNIT/ML injection pen INJECT 22 UNITS INTO THE SKIN NIGHTLY 45 mL 1    simvastatin (ZOCOR) 40 MG tablet take 1 tablet by mouth every evening 90 tablet 3    losartan (COZAAR) 100 MG tablet take 1 tablet by mouth once daily 90 tablet 3    hydroCHLOROthiazide (HYDRODIURIL) 25 MG tablet Take 1 tablet by mouth every morning 90 tablet 3    amLODIPine (NORVASC) 10 MG tablet take 1 tablet by mouth once daily 90 tablet 3    blood glucose monitor strips Test 1time a day & as needed for symptoms of irregular blood glucose. Dispense sufficient amount for indicated testing frequency plus additional to accommodate PRN testing needs. 100 strip 3    Continuous Blood Gluc Sensor (DEXCOM G6 SENSOR) MISC Change Sensor every 7-10 days Diagnosis:E11.9 Diagnosis:E11.9 12 each 3    Continuous Blood Gluc  (DEXCOM G6 ) LUDWIN Test blood sugar 8 times daily and as needed Diagnosis:E11.9 1 Device 0    Continuous Blood Gluc Transmit (DEXCOM G6 TRANSMITTER) MISC Change transmitter every 7-10 days Diagnosis:E11.9 12 each 3    aspirin 81 MG EC tablet Take 81 mg by mouth daily. No current facility-administered medications for this visit.      No Known Allergies  Health Maintenance   Topic Date Due    HIV screen  Never done    Hepatitis B vaccine (1 of 3 - Risk 3-dose series) Never done    DTaP/Tdap/Td vaccine (1 - Tdap) 11/29/2015    Shingles Vaccine (2 of 2) 12/15/2021    Diabetic microalbuminuria test  04/10/2022    Diabetic foot exam  04/20/2022    Diabetic retinal exam  08/18/2022    A1C test (Diabetic or Prediabetic)  09/25/2022    Lipid screen  09/25/2022    Potassium monitoring 09/25/2022    Creatinine monitoring  09/25/2022    PSA counseling  09/25/2022    Colon cancer screen colonoscopy  02/11/2023    Pneumococcal 0-64 years Vaccine (2 of 2 - PPSV23) 07/08/2027    Flu vaccine  Completed    COVID-19 Vaccine  Completed    Hepatitis C screen  Completed    Hepatitis A vaccine  Aged Out    Hib vaccine  Aged Out    Meningococcal (ACWY) vaccine  Aged Out         Objective:     Physical Exam  Constitutional:       General: He is not in acute distress. Appearance: He is well-developed. He is not diaphoretic. HENT:      Head: Normocephalic and atraumatic. Right Ear: External ear normal.      Left Ear: External ear normal.   Eyes:      Conjunctiva/sclera: Conjunctivae normal.   Neck:      Vascular: No JVD. Cardiovascular:      Rate and Rhythm: Normal rate and regular rhythm. Heart sounds: Normal heart sounds. Pulmonary:      Effort: Pulmonary effort is normal.      Breath sounds: Normal breath sounds. No wheezing or rales. Musculoskeletal:         General: No tenderness. Comments: Trace edema in the ankles bilaterally, discussed this may be related to his amlodipine   Skin:     General: Skin is warm and dry. Coloration: Skin is not pale. Comments: Right parietal scalp there is a small flaking area of skin 3 mm eyes: No discoloration or pigmentation   Neurological:      Mental Status: He is alert and oriented to person, place, and time. /72   Pulse 80   Temp 98.3 °F (36.8 °C) (Oral)   Resp 20   Ht 5' 8.75\" (1.746 m)   Wt 209 lb (94.8 kg)   BMI 31.09 kg/m²       Impression/Plan:  1. Type 1 diabetes mellitus with hypoglycemia and without coma ([facility] [address]    2. Essential hypertension    3.  Hyperlipidemia, unspecified hyperlipidemia type      Requested Prescriptions     Signed Prescriptions Disp Refills    insulin aspart (NOVOLOG FLEXPEN) 100 UNIT/ML injection pen 30 mL 5     Sig: INJECT 0 TO 30 UNITS SUBCUTEANOUSLY THREE TIMES A DAY PER SLIDING SCALE.  insulin glargine (LANTUS SOLOSTAR) 100 UNIT/ML injection pen 45 mL 1     Sig: INJECT 22 UNITS INTO THE SKIN NIGHTLY     Orders Placed This Encounter   Procedures    INFLUENZA, MDCK QUADV, 2 YRS AND OLDER, IM, PF, PREFILL SYR OR SDV, 0.5ML (FLUCELVAX QUADV, PF)    Zoster recombinant (SHINGRIX)    Hemoglobin A1C     Standing Status:   Future     Standing Expiration Date:   10/20/2022    Comprehensive Metabolic Panel     Standing Status:   Future     Standing Expiration Date:   10/20/2022    Microalbumin / Creatinine Urine Ratio     Standing Status:   Future     Standing Expiration Date:   10/20/2022   Call if ankle edema worsens. If there is change in his skin lesion on his scalp he will notify us immediately and we will make arrangements to have it excised declines to have anything done at this point. Follow-up 6 months    Patient giveneducational materials - see patient instructions. Discussed use, benefit, and side effects of prescribed medications. All patient questions answered. Pt voiced understanding. Reviewed health maintenance. Patient agreedwith treatment plan. Follow up as directed. **This report has been created using voice recognition software. It may contain minor errorswhich are inherent in voice recognition technology. **       Electronically signed by Juan Carlos Stringer MD on 10/20/2021 at 9:12 AM

## 2021-10-20 NOTE — PROGRESS NOTES
Immunizations Administered     Name Date Dose Route    Influenza, MDCK Quadv, IM, PF (Flucelvax 2 yrs and older) 10/20/2021 0.5 mL Intramuscular    Site: Deltoid- Left    Lot: [id number]    NDC: 58654-483-29    Zoster Recombinant (Shingrix) 10/20/2021 0.5 mL Intramuscular    Site: Deltoid- Right    Lot:     NDC: 65790-365-50        Abn waiver scanned to chart. Declined VIS.

## 2021-11-02 ENCOUNTER — HOSPITAL ENCOUNTER (OUTPATIENT)
Dept: MRI IMAGING | Age: 61
Discharge: HOME | End: 2021-11-02
Payer: COMMERCIAL

## 2021-11-02 DIAGNOSIS — R97.20 ELEVATED PSA: ICD-10-CM

## 2021-11-02 PROCEDURE — 6360000004 HC RX CONTRAST MEDICATION: Performed by: UROLOGY

## 2021-11-02 PROCEDURE — A9579 GAD-BASE MR CONTRAST NOS,1ML: HCPCS | Performed by: UROLOGY

## 2021-11-02 PROCEDURE — 72197 MRI PELVIS W/O & W/DYE: CPT

## 2021-11-02 RX ADMIN — GADOTERIDOL 20 ML: 279.3 INJECTION, SOLUTION INTRAVENOUS at 19:16

## 2021-11-09 ENCOUNTER — OFFICE VISIT (OUTPATIENT)
Dept: UROLOGY | Age: 61
End: 2021-11-09
Payer: COMMERCIAL

## 2021-11-09 VITALS
HEIGHT: 70 IN | WEIGHT: 210 LBS | DIASTOLIC BLOOD PRESSURE: 80 MMHG | SYSTOLIC BLOOD PRESSURE: 122 MMHG | BODY MASS INDEX: 30.06 KG/M2

## 2021-11-09 DIAGNOSIS — R35.1 NOCTURIA: ICD-10-CM

## 2021-11-09 DIAGNOSIS — R97.20 ELEVATED PSA: Primary | ICD-10-CM

## 2021-11-09 PROCEDURE — 99214 OFFICE O/P EST MOD 30 MIN: CPT | Performed by: UROLOGY

## 2021-11-09 RX ORDER — CIPROFLOXACIN 500 MG/1
500 TABLET, FILM COATED ORAL 2 TIMES DAILY
Qty: 6 TABLET | Refills: 0 | Status: SHIPPED | OUTPATIENT
Start: 2021-11-09 | End: 2022-01-27 | Stop reason: ALTCHOICE

## 2021-11-09 RX ORDER — GENTAMICIN SULFATE 40 MG/ML
80 INJECTION, SOLUTION INTRAMUSCULAR; INTRAVENOUS ONCE
Qty: 2 ML | Refills: 0 | Status: SHIPPED | OUTPATIENT
Start: 2021-11-09 | End: 2021-11-09

## 2021-11-09 NOTE — PROGRESS NOTES
HOLLIE Cuevas MD        [address] [address]  Dept: [phone]  Dept Fax: [phone]: [address] [address] Urology Office Note -     Patient:  Chris Olivas  YOB: 1960  Date: 11/9/2021    The patient is a 63 y.o. male who presents today for evaluation of the following problems:   Chief Complaint   Patient presents with    Follow-up     MRI prior    Elevated PSA    referred/consultation requested by Mark Parra MD.    HISTORY OF PRESENT ILLNESS:     Elevated PSA  Rising  No family hx  Here with prostate MRI seminal vesicle lesion? pirads 3 lesion      BPH  LUTS- irritative and obstructive symptoms  auass 14 not terribly bothered    Requested/reviewed records from Mark Parra MD office and/or outside physician/EMR    (Patient's old records have been requested, reviewed and pertinent findings summarized in today's note.)    Procedures Today: N/A      Last several PSA's:  Lab Results   Component Value Date    PSA 4.32 (H) 09/25/2021    PSA 3.67 (H) 04/10/2021    PSA 2.77 (H) 02/12/2020       Last total testosterone:  No results found for: TESTOSTERONE    Urinalysis today:  No results found for this visit on 11/09/21. Last BUN and creatinine:  Lab Results   Component Value Date    BUN 11 09/25/2021     Lab Results   Component Value Date    CREATININE 0.9 09/25/2021         Imaging Reviewed during this Office Visit:   imaging independently reviewed by Christal Gresham MD and radiology report verified demonstrating     MRI PROSTATE W WO CONTRAST    Result Date: 11/3/2021  PROCEDURE: MRI PROSTATE W WO CONTRAST CLINICAL INFORMATION: Elevated PSA COMPARISON: No prior study. TECHNIQUE: Axial T2, coronal T2 and sagittal T2 imaging of the prostate gland. Large field of view axial T2 imaging of the prostate gland.  Axial T1, axial T1 VIBE dynamic post debbie and axial T1 VIBE dynamic subtracted post debbie images through the prostate gland. Diffusion 50, 800, 1400 and ADC maps through the prostate gland. Axial T1 STAR VIBE post debbie through the pelvis. 3-D images reconstructed on a separate [employer] workstation with MRI TRUS fusion of prostate mass lesions. CONTRAST: 20 mL of ProHance. LABORATORY: 1. 2/12/2020. PSA measures 2.77. 2. 4/10/2021. PSA measures 3.67. 3. 9/25/2021. PSA measures 4.32. Lawernce Roughen FINDINGS: PROSTATE SIZE: 1. The prostate gland measures 5.7 x 3.3 x 4.9 cm in size. 2. The prostate volume is 53.56 ml. TRANSITIONAL ZONE: 1. Changes of benign prostatic hyperplasia in the transitional zone. 2. 1.7 x 1.4 cm nodule in the transitional zone along the base of the prostate gland on the right side. PI-RADS 3.. CENTRAL ZONE: 1. Unremarkable. PERIPHERAL ZONE: 1. 1.6 x 1.3 cm area of increased signal intensity in the peripheral zone on the right side posteriorly, possibly related to previous inflammatory disease. PI-RADS 2. PROSTATE CAPSULE/NV BUNDLE/SEMINAL VESICLES: There is a 1.9 x 1.2 cm nodule in the seminal vesicle in the right side. The prostate capsule and neurovascular bundle appears be unremarkable. Lawernce Roughen URINARY BLADDER/RECTUM/PELVIC DIAPHRAGM: Slightly thick-walled bladder. Lawernce Roughen PATHOLOGICALLY ENLARGED LYMPH NODES: 1. None. OSSEOUS STRUCTURES: 1. Unremarkable. OTHER: 1. None. 1. 1. The prostate gland measures 5.7 x 3.3 x 4.9 cm in size. 2. The prostate volume measures 53.56 mL. 3. Changes of benign prostatic hyperplasia in the transitional zone. 4. 1.7 x 1.4 cm nodule in the transitional zone along the base of the prostate gland on the right side. PI-RADS 3. 5. Slightly increased signal intensity in the peripheral zone on the right side posteriorly possibly related to previous inflammatory disease. PI-RADS 2. 6. 1.9 x 1.2 cm nodule in the seminal vesicle on the right side. 7. Slightly thick-walled bladder. 3. **This report has been created using voice recognition software.  It may contain minor errors which are inherent in voice recognition technology. ** Final report electronically signed by DR Leah Pemberton on 11/3/2021 2:11 PM      PAST MEDICAL, FAMILY AND SOCIAL HISTORY:  Past Medical History:   Diagnosis Date    Hyperlipidemia     Hypertension     Type 1 diabetes mellitus with hypoglycemia without coma [facility])      Past Surgical History:   Procedure Laterality Date    COLONOSCOPY  2/11/13         No family history on file. Outpatient Medications Marked as Taking for the 11/9/21 encounter (Office Visit) with Praveen Dawson MD   Medication Sig Dispense Refill    insulin aspart (NOVOLOG FLEXPEN) 100 UNIT/ML injection pen INJECT 0 TO 30 UNITS SUBCUTEANOUSLY THREE TIMES A DAY PER SLIDING SCALE. 30 mL 5    insulin glargine (LANTUS SOLOSTAR) 100 UNIT/ML injection pen INJECT 22 UNITS INTO THE SKIN NIGHTLY 45 mL 1    simvastatin (ZOCOR) 40 MG tablet take 1 tablet by mouth every evening 90 tablet 3    losartan (COZAAR) 100 MG tablet take 1 tablet by mouth once daily 90 tablet 3    hydroCHLOROthiazide (HYDRODIURIL) 25 MG tablet Take 1 tablet by mouth every morning 90 tablet 3    amLODIPine (NORVASC) 10 MG tablet take 1 tablet by mouth once daily 90 tablet 3    blood glucose monitor strips Test 1time a day & as needed for symptoms of irregular blood glucose. Dispense sufficient amount for indicated testing frequency plus additional to accommodate PRN testing needs. 100 strip 3    Continuous Blood Gluc Sensor (DEXCOM G6 SENSOR) MISC Change Sensor every 7-10 days Diagnosis:E11.9 Diagnosis:E11.9 12 each 3    Continuous Blood Gluc  (DEXCOM G6 ) LUDWIN Test blood sugar 8 times daily and as needed Diagnosis:E11.9 1 Device 0    Continuous Blood Gluc Transmit (DEXCOM G6 TRANSMITTER) MISC Change transmitter every 7-10 days Diagnosis:E11.9 12 each 3    aspirin 81 MG EC tablet Take 81 mg by mouth daily. Patient has no known allergies.   Social History     Tobacco Use   Smoking Status Never Smoker   Smokeless Tobacco Never Used      (If patient a smoker, smoking cessation counseling offered)   Social History     Substance and Sexual Activity   Alcohol Use Yes    Comment: occas       REVIEW OF SYSTEMS:  Constitutional: negative  Eyes: negative  Respiratory: negative  Cardiovascular: negative  Gastrointestinal: negative  Genitourinary: see HPI  Musculoskeletal: negative  Skin: negative   Neurological: negative  Hematological/Lymphatic: negative  Psychological: negative      Physical Exam:    This a 63 y.o. male  Vitals:    11/09/21 1532   BP: 122/80     Body mass index is 30.13 kg/m². Constitutional: Patient in no acute distress;       Assessment and Plan        1. Elevated PSA    2. Nocturia               Plan:      Elevated PSA- discussed prostate MRI. Needs uronav. BPH- 50 gram. not bothered enough to start meds at this time    Follow up for uronav        Prescriptions Ordered:  No orders of the defined types were placed in this encounter. Orders Placed:  No orders of the defined types were placed in this encounter.            Desmond Ambriz MD

## 2021-11-10 ENCOUNTER — TELEPHONE (OUTPATIENT)
Dept: UROLOGY | Age: 61
End: 2021-11-10

## 2021-11-10 NOTE — TELEPHONE ENCOUNTER
MRI FUSION GUIDED PROSTATE ULTRASOUND/BIOPSY WITH DR Cy Almazan      1960    You are scheduled for the above procedure on:    11/23/21   at:    1:15 PM  Your follow up appointment for your biopsy results is on:    12/3/21     At:   11:00 AM    Please note that you will be responsible for any charges that are not paid by your insurance. The prostate biopsy specimens are sent to a Lab and their charges are billed to you separate from the office charges. DESCRIPTION OF PROSTATIC ULTRASOUND AND BIOPSY  Ultrasound uses harmless sound waves to give us pictures of the prostate, and allows us to accurately guide a biopsy needle to areas of concern. The procedure is done in the office. Initially, a complete prostate exam is done. Next the ultrasound probe, finger-like in size and shape, is placed into the rectum. With slight movement of the probe, many different views are obtained. A prostate block may or may not be given at this time. A spring loaded fine needle is place through the probe and directed into the prostate. Six or more biopsies are usually taken. These biopsies are not usually painful. The entire exam takes 20-30 minutes. POSSIBLE RISKS OF THE PROCEDURE  Blood may be noted in the stool and urine for a few days. Blood may be seen in the semen for up to a month. Infection of the prostate or in the urine can occur even with antibiotic preparation. You should call if you develop fever, chills, severe pain, or have continuous or significant bleeding. If you have known hemorrhoids, you may have more bleeding and discomfort in the rectal area following the biopsy. This may last for 3-5 days afterwards. If any concerns arise, please call the office. PREPARATION  1.  DO NOT TAKE: ASPIRIN, MOTRIN, PLAVIX, IBUPROFEN, MOBIC/ MELOXICAM, COUMADIN, FISH OIL, AND VITAMIN E FOR 5 DAYS BEFORE THE BIOPSY AND 3 DAYS AFTER THE BIOPSY.   YOU MAY TAKE TYLENOL IF NEEDED  2. Please eat a regular breakfast/lunch. 3.  Take your antibiotics as directed:  Cipro 500 mg twice a day, start on:   11/22/21    take until finished. 4.  Bring your Gentamicin 80 mg with you to your appointment. 5.  Do a Fleets Enema 2 hours before the visit. Purchase it at any drug store and follow the instructions on the package. 6. Please ensure to bring a  with you the day of the surgery to transport you home. HOME GOING AND FOLLOW UP INSTRUCTIONS  Call the doctor if: 1. There is a large amount of blood or clots in the urine or stool. 2.  You are unable to urinate. 3.  If your temperature is 101 degrees F or greater. 4.  You could see blood in your semen for up to 2-months            5.   You may resume your regular medication 3-days after procedure    DATE: 11/10/2021       SIGNATURE:________________________________

## 2021-11-23 ENCOUNTER — PROCEDURE VISIT (OUTPATIENT)
Dept: UROLOGY | Age: 61
End: 2021-11-23
Payer: COMMERCIAL

## 2021-11-23 DIAGNOSIS — R97.20 ELEVATED PSA: Primary | ICD-10-CM

## 2021-11-23 PROCEDURE — 96372 THER/PROPH/DIAG INJ SC/IM: CPT | Performed by: UROLOGY

## 2021-11-23 PROCEDURE — 55700 PR BIOPSY OF PROSTATE,NEEDLE/PUNCH: CPT | Performed by: UROLOGY

## 2021-11-23 PROCEDURE — 76872 US TRANSRECTAL: CPT | Performed by: UROLOGY

## 2021-11-23 RX ORDER — GENTAMICIN SULFATE 40 MG/ML
80 INJECTION, SOLUTION INTRAMUSCULAR; INTRAVENOUS ONCE
Status: COMPLETED | OUTPATIENT
Start: 2021-11-23 | End: 2021-11-23

## 2021-11-23 RX ADMIN — GENTAMICIN SULFATE 80 MG: 40 INJECTION, SOLUTION INTRAMUSCULAR; INTRAVENOUS at 14:20

## 2021-11-23 NOTE — PROGRESS NOTES
Procedure: Trus/Biopsy  Pt name and birth date verified Yes  Patient agrees Dr. Prabha Allen is taking biopsies of the prostate Yes  Patient took Enema 2 hours prior to procedure Yes  Patient took 2 pill(s) of cipro day before procedure, day of, and will the day after Yes  Has patient eaten today? Yes  Patient stopped all blood thinners prior to surgery Yes      Patient has given me verbal consent to perform Gentamicin Injection  Yes    Following Dr. Blanca Rodrigues of [facility].   Gentamicin 80MG/2ML GIVEN I[employer]M Right [employer] HIP  Lot Number: [id number]  Expiration Date: 03/01/2023  [facility] #: [id number]    Patient supplied their own medications Yes

## 2021-11-23 NOTE — PROGRESS NOTES
Mr. Mirela Oliveros was seen in follow up for his prostate biopsy. The biopsy was indicated and is being performed for elevated psa. The biopsy is being done with MRI / Ultrasound fusion using the UroNav system. The biopsy was done without difficulty or complication. TRUS prostate biopsy note:  After obtaining informed consent, the rectal ultrasound probe was passed per rectum and the prostate visualized. A local block was performed by instilling 2% lidocaine at the base. Measurements were taken and the prostate measured for a total volume of 60 cc. At this point, prostate biopsy was performed. Using Michelle Maciel, the ultrasound and MRI images were fused and then biopsies of the lesions identified by the radiologist were taken. Three cores were taken from each of the 3 lesions seen on MRI. Two cores were then  taken at the base, the mid-portion, and the apex of the gland on either side for a total of 12 cores. The rectal probe was removed and there was minimal bleeding. Mr. Mirela Oliveros tolerated the procedure well and there were no complications. Prostate size: 60 cc    Cores taken:6 + 3 cores each from 3 lesions making 15 total cores  Complications: none    Assessment:      Prostate biopsy performed without difficulty. This was done with UroNav MRI fused guidance. Plan:        I will see Teresa Guy back to discuss the pathology in 1-2 weeks. Further recommendations will be based on these results.

## 2021-12-03 ENCOUNTER — TELEPHONE (OUTPATIENT)
Dept: UROLOGY | Age: 61
End: 2021-12-03

## 2021-12-03 ENCOUNTER — OFFICE VISIT (OUTPATIENT)
Dept: UROLOGY | Age: 61
End: 2021-12-03
Payer: COMMERCIAL

## 2021-12-03 VITALS
SYSTOLIC BLOOD PRESSURE: 132 MMHG | DIASTOLIC BLOOD PRESSURE: 82 MMHG | HEIGHT: 70 IN | BODY MASS INDEX: 30.35 KG/M2 | WEIGHT: 212 LBS

## 2021-12-03 DIAGNOSIS — C61 PROSTATE CANCER (HCC): Primary | ICD-10-CM

## 2021-12-03 PROCEDURE — 99214 OFFICE O/P EST MOD 30 MIN: CPT | Performed by: UROLOGY

## 2021-12-03 NOTE — PROGRESS NOTES
HOLLIE Cowart MD        [address] [facility] [address]  Dept: [phone]  Dept Fax: [phone]: [address] [address] Urology Office Note -     Patient:  Chrissy Vergara  YOB: 1960  Date: 12/3/2021    The patient is a 63 y.o. male who presents today for evaluation of the following problems:   Chief Complaint   Patient presents with    Follow-up     bx results- distress survey     referred/consultation requested by Patricia Beltre MD.    HISTORY OF PRESENT ILLNESS:     Prostate Cancer    FINAL DIAGNOSIS:   A.  Prostate, right base, biopsy:             Benign prostatic tissue. B.  Prostate, right mid, biopsy:             Prostatic adenocarcinoma with mucinous features, Curtis   score 3+4 = 7, grade  group 2, 2/18 mm, 11%. C.  Prostate, right apex, biopsy:             Prostatic adenocarcinoma, [place] score 3+3 = 6, grade group   1, less than 1 mm,  less than 5%. D.  Prostate, left base, biopsy:             Prostatic adenocarcinoma with focal mucin, [place] score 4+3   = 7, grade group 3,  5/11 mm, 45%. E.  Prostate, left mid, biopsy:             Benign prostatic tissue. [place], left apex, biopsy:             Benign prostatic tissue. G.  Prostate, lesion #1, biopsies:             Benign prostatic tissue. H.  Prostate, lesion #2, biopsies:             Benign prostatic tissue with chronic inflammation. I.  Prostate, lesion #3, biopsies:             Benign prostatic tissue.        No family hx  Here with prostate MRI seminal vesicle lesion? pirads 3 lesion      BPH  LUTS- irritative and obstructive symptoms  auass 14 not terribly bothered    Requested/reviewed records from Patricia Beltre MD office and/or outside physician/EMR    (Patient's old records have been requested, reviewed and pertinent findings summarized in today's note.)    Procedures Today: negative  Genitourinary: see HPI  Musculoskeletal: negative  Skin: negative   Neurological: negative  Hematological/Lymphatic: negative  Psychological: negative      Physical Exam:    This a 63 y.o. male  Vitals:    12/03/21 1059   BP: 132/82     Body mass index is 30.42 kg/m². Constitutional: Patient in no acute distress;       Assessment and Plan        1. Prostate cancer [facility])               Plan:      Prostate cancer- diagnosed on uronav biopsy. New diagnosis. Will send for decipher  BPH- 50 gram. not bothered enough to start meds at this time      Prostate Cancer Treatment Options  I have discussed in great detail with the patient the natural history, diagnosis and treatment of prostate cancer. I explained the [place] grading system as well as the   various treatments available for prostate cancer. I discussed the following options:    1. Watchful waiting / Active surveillance. I told that this approach was appropriate for older patients, patients with a life expectancy of 10 years or less and patients with low grade, low volume disease. This approach will require serial ANA LAURA's, PSA's and possibly repeat prostate biopsy to check for grade or stage progression. 2.  Hormonal Therapy. I discussed the role of hormonal therapy in the form of LHRH agonists or antagonists such as Lupron, etc. Or surgical castration in the form of bilateral orchiectomy. The patient was told that this is primarily used in patients with vasiliy or metastatic disease or in conjunction with radiation therapy. The side effects include hot flashes, fatigue, breast enlargement, diminished libido, ED and long term development of osteoporosis. The patient was told he will encouraged to take supplemental Calcium and Vitamin D to prevent the latter. 3.  Radiation Therapy in the form of external beam radiation (IMRT) or prostate brachytherapy.  Patient told that IMRT is given 5 days a week for 7-8 weeks and the side effects include rectal and bladder injury (OAB), erectile dysfunction (ED) and incontinence. Long term the patient may experience hematuria and radiation cystitis. Brachytherapy is done as an outpatient procedure under general anesthesia and postop the patient may experience dysuria, urgency, frequency, urge incontinence, increasing obstructive voiding symptoms and to a lesser degree than IMRT, rectal radiation injury and ED. Patients may require concomitant hormonal therapy with IMRT depending on the grade and extent of cancer. The patient may require hormonal therapy to downsize the prostate gland prior to brachytherapy. 4.  Open or Robotic assisted laparoscopic radical prostatectomy. Patient told about the options of open vs. Robotic assisted laparoscopic prostatectomy with or without pelvic lymphadenectomy. I discussed the risks including infection, bleeding, the risks of anesthesia, DVT, PE, MI, stroke, death, rectal injury and urethral stricture/bladder neck contracture. I discussed the side effect of stress urinary incontinence which is temporary for most patients. I discussed the side effect of ED and the fact that it may take 6-18 months to recover this function. 5.  Cryotherapy. I discussed the fact that this treatment option has a 100% ED rate and is used primarily for radiation treatment failures. Prescriptions Ordered:  No orders of the defined types were placed in this encounter. Orders Placed:  No orders of the defined types were placed in this encounter.            Siria Marks MD

## 2022-01-18 ENCOUNTER — OFFICE VISIT (OUTPATIENT)
Dept: UROLOGY | Age: 62
End: 2022-01-18
Payer: COMMERCIAL

## 2022-01-18 VITALS
BODY MASS INDEX: 30.92 KG/M2 | WEIGHT: 216 LBS | HEIGHT: 70 IN | DIASTOLIC BLOOD PRESSURE: 80 MMHG | SYSTOLIC BLOOD PRESSURE: 144 MMHG

## 2022-01-18 DIAGNOSIS — C61 PROSTATE CANCER (HCC): Primary | ICD-10-CM

## 2022-01-18 PROCEDURE — 99215 OFFICE O/P EST HI 40 MIN: CPT | Performed by: UROLOGY

## 2022-01-18 NOTE — PROGRESS NOTES
[facility] [place] [address] [facility] [address]  Dept: [phone]  Dept Fax: [phone]: [address] [address] Urology Office Note -     Patient:  Jyotsna Randolph  YOB: 1960  Date: 1/18/2022    The patient is a 63 y.o. male who presents today for evaluation of the following problems:   Chief Complaint   Patient presents with    Follow-up     discuss decipher     referred/consultation requested by Olivier Zayas MD.    HISTORY OF PRESENT ILLNESS:     Prostate Cancer    FINAL DIAGNOSIS:   A.  Prostate, right base, biopsy:             Benign prostatic tissue. B.  Prostate, right mid, biopsy:             Prostatic adenocarcinoma with mucinous features, [place]   score 3+4 = 7, grade  group 2, 2/18 mm, 11%. C.  Prostate, right apex, biopsy:             Prostatic adenocarcinoma, [place] score 3+3 = 6, grade group   1, less than 1 mm,  less than 5%. D.  Prostate, left base, biopsy:             Prostatic adenocarcinoma with focal mucin, Curtis score 4+3   = 7, grade group 3,  5/11 mm, 45%. E.  Prostate, left mid, biopsy:             Benign prostatic tissue. Migelno Jenkinsler, left apex, biopsy:             Benign prostatic tissue. G.  Prostate, lesion #1, biopsies:             Benign prostatic tissue. H.  Prostate, lesion #2, biopsies:             Benign prostatic tissue with chronic inflammation. I.  Prostate, lesion #3, biopsies:             Benign prostatic tissue.        No family hx   prostate MRI seminal vesicle lesion? pirads 3 lesion  Here w decipher testing and to discuss surgery    BPH  LUTS- irritative and obstructive symptoms  auass 14 not terribly bothered  53 gram prostate    Requested/reviewed records from Olivier Zayas MD office and/or outside physician/EMR    (Patient's old records have been requested, reviewed and pertinent findings summarized in today's note.)    Procedures Today: N/A      Last several PSA's:  Lab Results   Component Value Date    PSA 4.32 (H) 09/25/2021    PSA 3.67 (H) 04/10/2021    PSA 2.77 (H) 02/12/2020       Last total testosterone:  No results found for: TESTOSTERONE    Urinalysis today:  No results found for this visit on 01/18/22. Last BUN and creatinine:  Lab Results   Component Value Date    BUN 11 09/25/2021     Lab Results   Component Value Date    CREATININE 0.9 09/25/2021         Imaging Reviewed during this Office Visit:   imaging independently reviewed by Terence Richardson MD and radiology report verified demonstrating     MRI PROSTATE W WO CONTRAST    Result Date: 11/3/2021  PROCEDURE: MRI PROSTATE W WO CONTRAST CLINICAL INFORMATION: Elevated PSA COMPARISON: No prior study. TECHNIQUE: Axial T2, coronal T2 and sagittal T2 imaging of the prostate gland. Large field of view axial T2 imaging of the prostate gland. Axial T1, axial T1 VIBE dynamic post debbie and axial T1 VIBE dynamic subtracted post debbie images through the prostate gland. Diffusion 50, 800, 1400 and ADC maps through the prostate gland. Axial T1 STAR VIBE post debbie through the pelvis. 3-D images reconstructed on a separate FusionStorm Cad workstation with MRI TRUS fusion of prostate mass lesions. CONTRAST: 20 mL of ProHance. LABORATORY: 1. 2/12/2020. PSA measures 2.77. 2. 4/10/2021. PSA measures 3.67. 3. 9/25/2021. PSA measures 4.32. Filemon Rich FINDINGS: PROSTATE SIZE: 1. The prostate gland measures 5.7 x 3.3 x 4.9 cm in size. 2. The prostate volume is 53.56 ml. TRANSITIONAL ZONE: 1. Changes of benign prostatic hyperplasia in the transitional zone. 2. 1.7 x 1.4 cm nodule in the transitional zone along the base of the prostate gland on the right side. PI-RADS 3.. CENTRAL ZONE: 1. Unremarkable. PERIPHERAL ZONE: 1. 1.6 x 1.3 cm area of increased signal intensity in the peripheral zone on the right side posteriorly, possibly related to previous inflammatory disease. PI-RADS 2.  PROSTATE CAPSULE/NV BUNDLE/SEMINAL VESICLES: There is a 1.9 x 1.2 cm nodule in the seminal vesicle in the right side. The prostate capsule and neurovascular bundle appears be unremarkable. Roslynn Mutton URINARY BLADDER/RECTUM/PELVIC DIAPHRAGM: Slightly thick-walled bladder. Roslynn Mutton PATHOLOGICALLY ENLARGED LYMPH NODES: 1. None. OSSEOUS STRUCTURES: 1. Unremarkable. OTHER: 1. None. 1. 1. The prostate gland measures 5.7 x 3.3 x 4.9 cm in size. 2. The prostate volume measures 53.56 mL. 3. Changes of benign prostatic hyperplasia in the transitional zone. 4. 1.7 x 1.4 cm nodule in the transitional zone along the base of the prostate gland on the right side. PI-RADS 3. 5. Slightly increased signal intensity in the peripheral zone on the right side posteriorly possibly related to previous inflammatory disease. PI-RADS 2. 6. 1.9 x 1.2 cm nodule in the seminal vesicle on the right side. 7. Slightly thick-walled bladder. 3. **This report has been created using voice recognition software. It may contain minor errors which are inherent in voice recognition technology. ** Final report electronically signed by DR Ana Bynum on 11/3/2021 2:11 PM      PAST MEDICAL, FAMILY AND SOCIAL HISTORY:  Past Medical History:   Diagnosis Date    Hyperlipidemia     Hypertension     Type 1 diabetes mellitus with hypoglycemia without coma [facility])      Past Surgical History:   Procedure Laterality Date    COLONOSCOPY  2/11/13         No family history on file. No outpatient medications have been marked as taking for the 1/18/22 encounter (Office Visit) with Geroge Gaucher, MD.       Patient has no known allergies.   Social History     Tobacco Use   Smoking Status Never Smoker   Smokeless Tobacco Never Used      (If patient a smoker, smoking cessation counseling offered)   Social History     Substance and Sexual Activity   Alcohol Use Yes    Comment: occas       REVIEW OF SYSTEMS:  Constitutional: negative  Eyes: negative  Respiratory: negative  Cardiovascular: negative  Gastrointestinal: negative  Genitourinary: see HPI  Musculoskeletal: negative  Skin: negative   Neurological: negative  Hematological/Lymphatic: negative  Psychological: negative      Physical Exam:    This a 63 y.o. male  Vitals:    01/18/22 1532   BP: (!) 144/80     Body mass index is 30.99 kg/m². Constitutional: Patient in no acute distress;       Assessment and Plan        1. Prostate cancer [facility])               Plan:      Prostate cancer- discussed options. Pt elects for prostatectomy. BPH- 50 gram. not bothered enough to start meds at this time      Prostate Cancer Treatment Options  I have discussed in great detail with the patient the natural history, diagnosis and treatment of prostate cancer. I explained the Curtis grading system as well as the   various treatments available for prostate cancer. I discussed the following options:    1. Watchful waiting / Active surveillance. I told that this approach was appropriate for older patients, patients with a life expectancy of 10 years or less and patients with low grade, low volume disease. This approach will require serial ANA LAURA's, PSA's and possibly repeat prostate biopsy to check for grade or stage progression. 2.  Hormonal Therapy. I discussed the role of hormonal therapy in the form of LHRH agonists or antagonists such as Lupron, etc. Or surgical castration in the form of bilateral orchiectomy. The patient was told that this is primarily used in patients with vasiliy or metastatic disease or in conjunction with radiation therapy. The side effects include hot flashes, fatigue, breast enlargement, diminished libido, ED and long term development of osteoporosis. The patient was told he will encouraged to take supplemental Calcium and Vitamin D to prevent the latter. 3.  Radiation Therapy in the form of external beam radiation (IMRT) or prostate brachytherapy.  Patient told that IMRT is given 5 days a week for 7-8 weeks and the side effects include rectal and bladder injury (OAB), erectile dysfunction (ED) and incontinence. Long term the patient may experience hematuria and radiation cystitis. Brachytherapy is done as an outpatient procedure under general anesthesia and postop the patient may experience dysuria, urgency, frequency, urge incontinence, increasing obstructive voiding symptoms and to a lesser degree than IMRT, rectal radiation injury and ED. Patients may require concomitant hormonal therapy with IMRT depending on the grade and extent of cancer. The patient may require hormonal therapy to downsize the prostate gland prior to brachytherapy. 4.  Open or Robotic assisted laparoscopic radical prostatectomy. Patient told about the options of open vs. Robotic assisted laparoscopic prostatectomy with or without pelvic lymphadenectomy. I discussed the risks including infection, bleeding, the risks of anesthesia, DVT, PE, MI, stroke, death, rectal injury and urethral stricture/bladder neck contracture. I discussed the side effect of stress urinary incontinence which is temporary for most patients. I discussed the side effect of ED and the fact that it may take 6-18 months to recover this function. 5.  Cryotherapy. I discussed the fact that this treatment option has a 100% ED rate and is used primarily for radiation treatment failures. Prescriptions Ordered:  No orders of the defined types were placed in this encounter. Orders Placed:  No orders of the defined types were placed in this encounter.            Delaney Carrasco MD

## 2022-01-19 ENCOUNTER — TELEPHONE (OUTPATIENT)
Dept: UROLOGY | Age: 62
End: 2022-01-19

## 2022-01-19 ENCOUNTER — PREP FOR PROCEDURE (OUTPATIENT)
Dept: UROLOGY | Age: 62
End: 2022-01-19

## 2022-01-19 DIAGNOSIS — C61 PROSTATE CANCER (HCC): Primary | ICD-10-CM

## 2022-01-19 DIAGNOSIS — Z01.818 PRE-OP TESTING: ICD-10-CM

## 2022-01-19 RX ORDER — SODIUM CHLORIDE 9 MG/ML
INJECTION, SOLUTION INTRAVENOUS CONTINUOUS
Status: CANCELLED | OUTPATIENT
Start: 2022-02-03

## 2022-01-19 NOTE — TELEPHONE ENCOUNTER
Patient scheduled for a Robotic Assisted Laparoscopic Radical Prostatectomy with Bilateral Lymph Node Dissection with Dr Dee Le on 2/3/22. Patient is having labs, ekg and chest xray done tomorrow.  We are asking for medical clearance

## 2022-01-19 NOTE — TELEPHONE ENCOUNTER
[address] [facility]rology  [facility]  [place] [address], [address]  [phone]    START BOWEL PREP ON 2/2/22    Surgery Bowel Preparation    Purchase a 10 ounce bottle of Magnesium Citrate at your pharmacy and drink the afternoon before your scheduled surgery. Start drinking approximately 2:00pm.  Suggest drinking it chilled with Sprite or Ginger Ale. Start a clear liquid diet (for dinner) the evening before surgery. You may have the following:   Water   Apple, grape or cranberry juice   Clear, fat free broth   Clear sodas (7-up, Sprite)   Plain gelatin (Jell-o)   Popsicles without bits of fruit or fruit pulp   Tea or coffee without milk or cream    Nothing to eat or drink after midnight before your scheduled surgery. Do a fleets enema the night prior to your surgery between 7:00pm and 8:00pm    Please contact our office at [phone] if you have any questions.

## 2022-01-19 NOTE — TELEPHONE ENCOUNTER
Patient scheduled for surgery with Dr Valeriy Hawley on 2/3/22. Surgery consent on arrival. Patient to do pre op urine culture ,fasting labs and ekg on 1/20/22. Dr Amy Weston to clear. Surgery instructions mailed to the patient. Referral to Robb Robison made.

## 2022-01-19 NOTE — TELEPHONE ENCOUNTER
DO NOT TAKE ASPIRIN, PLAVIX, FISH OIL, COUMADIN, IBUPROFEN, MOTRIN-LIKE DRUGS AND ANY MULTIVITAMINS OR OVER THE COUNTER SUPPLEMENTS 5 DAYS PRIOR TO SURGERY. Tania Almazan 1960 Diagnosis:     Surgical Physician: Dr. Justina Robertson have been scheduled for the procedure marked below:      Surgery: Robotic Assisted Laparscopic Radical Prostatectomy with Bilateral Lymph Node Disection        Date: 2/3/22     Anesthesia: Anesthesiologist (General/Spinal)     Place of Service: [address] [place] [address] Second Floor Same Day Surgery         Arrive to same day surgery by:  10:00am  (Surgery time is subject to change)      INSTRUCTIONS AS MARKED BELOW:    1.  DO NOT eat or drink anything after midnight before surgery. 2.  We prefer you shower or bathe with an antibacterial soap (Dial) the morning of surgery. 3.  Plan to spend the overnight at the hospital the day of surgery. 4.  Please bring a current medication list, photo ID and insurance card(s) with you  5. Okay to take Tylenol  6. If you take Glucophage, Metformin or Janumet, hold 48-hours prior to surgery. Hold Insulin the morning of surgery. 7.  Take blood pressure or heart medication as directed, if taken in the morning take with a small sip of water  8. Start the bowel prep the day before surgery on 2/2/22. Instructions included. 5.  Tonnie Fleischer, PA-C  may assist with your Robotic surgery  10. The office will call you in 1-2 days after your procedure to schedule a follow up. DATE SENSITIVE TESTING *WALK IN *NO APPOINTMENT    Do Urine Culture, EKG, and Fasting Labs on 1/20/22. Orders Included.         Date: 1/19/2022

## 2022-01-19 NOTE — TELEPHONE ENCOUNTER
Robotic Surgery Scheduling Form   [facility] [address]    Phone * [phone]   Surgical Scheduling Direct line Phone * [phone]  Fax * [phone]      Darien Pat      1960    male    [facility] [place]  [address] [place] [id number]  Marital Status:         Home Phone: [phone]   Cell Phone:   Telephone Information:   Mobile [phone]              Surgeon: Dr. Fabio Ghosh Surgery Date:2/3/22 Time: 12:00pm     Procedure: Robotic Assisted Laparscopic Radical Prostatectomy with Bilateral Lymph Node Disection  Outpatient/OBS     Diagnosis: Prostate Cancer    Important Medical History: IN EPIC    Special Inst/Equip: XI    CPT Codes: 59136,82938    Latex Allergy:   no Cardiac Device:  no    Case Location:  Main OR     Preadmission Testing:  Phone Call    PAT Date and Time: ________________________________    PAT Confirmation #: _________________________________    Post Op Visit:  ______________________________________    Need Preop Cardiac Clearance:   no    Does patient have Cardiologist/physician?   Dr. Ayaan Velásquez to clear    Surgery Conformation #:  ______________________________________________    Leonora Ran: __________________________________ Date:____________________    Firefly:  No    Dual Console:  No   Ultrasound:  No    Single Site: No    RNFA (colon resection only):  Assisting Surgeon:  Kyle Oliver, [facility] Name:  Mario Loo

## 2022-01-20 ENCOUNTER — HOSPITAL ENCOUNTER (OUTPATIENT)
Age: 62
Discharge: HOME | End: 2022-01-20
Payer: COMMERCIAL

## 2022-01-20 DIAGNOSIS — C61 PROSTATE CANCER (HCC): ICD-10-CM

## 2022-01-20 DIAGNOSIS — Z01.818 PRE-OP TESTING: ICD-10-CM

## 2022-01-20 LAB
ANION GAP SERPL CALCULATED.3IONS-SCNC: 11 MEQ/L (ref 8–16)
BASOPHILS # BLD: 0.8 %
BASOPHILS ABSOLUTE: 0 THOU/MM3 (ref 0–0.1)
BUN BLDV-MCNC: 14 MG/DL (ref 7–22)
CALCIUM SERPL-MCNC: 10 MG/DL (ref 8.5–10.5)
CHLORIDE BLD-SCNC: 101 MEQ/L (ref 98–111)
CO2: 26 MEQ/L (ref 23–33)
CREAT SERPL-MCNC: 0.8 MG/DL (ref 0.4–1.2)
EKG ATRIAL RATE: 57 BPM
EKG P AXIS: 24 DEGREES
EKG P-R INTERVAL: 172 MS
EKG Q-T INTERVAL: 436 MS
EKG QRS DURATION: 100 MS
EKG QTC CALCULATION (BAZETT): 424 MS
EKG R AXIS: 13 DEGREES
EKG T AXIS: 16 DEGREES
EKG VENTRICULAR RATE: 57 BPM
EOSINOPHIL # BLD: 4.3 %
EOSINOPHILS ABSOLUTE: 0.2 THOU/MM3 (ref 0–0.4)
ERYTHROCYTE [DISTWIDTH] IN BLOOD BY AUTOMATED COUNT: 13.2 % (ref 11.5–14.5)
ERYTHROCYTE [DISTWIDTH] IN BLOOD BY AUTOMATED COUNT: 44.8 FL (ref 35–45)
GFR SERPL CREATININE-BSD FRML MDRD: > 90 ML/MIN/1.73M2
GLUCOSE BLD-MCNC: 105 MG/DL (ref 70–108)
HCT VFR BLD CALC: 45.3 % (ref 42–52)
HEMOGLOBIN: 15 GM/DL (ref 14–18)
IMMATURE GRANS (ABS): 0.01 THOU/MM3 (ref 0–0.07)
IMMATURE GRANULOCYTES: 0.2 %
LYMPHOCYTES # BLD: 21.2 %
LYMPHOCYTES ABSOLUTE: 1.1 THOU/MM3 (ref 1–4.8)
MCH RBC QN AUTO: 31.2 PG (ref 26–33)
MCHC RBC AUTO-ENTMCNC: 33.1 GM/DL (ref 32.2–35.5)
MCV RBC AUTO: 94.2 FL (ref 80–94)
MONOCYTES # BLD: 16.9 %
MONOCYTES ABSOLUTE: 0.9 THOU/MM3 (ref 0.4–1.3)
NUCLEATED RED BLOOD CELLS: 0 /100 WBC
PLATELET # BLD: 260 THOU/MM3 (ref 130–400)
PMV BLD AUTO: 9.5 FL (ref 9.4–12.4)
POTASSIUM SERPL-SCNC: 4.9 MEQ/L (ref 3.5–5.2)
RBC # BLD: 4.81 MILL/MM3 (ref 4.7–6.1)
SEG NEUTROPHILS: 56.6 %
SEGMENTED NEUTROPHILS ABSOLUTE COUNT: 2.9 THOU/MM3 (ref 1.8–7.7)
SODIUM BLD-SCNC: 138 MEQ/L (ref 135–145)
WBC # BLD: 5.1 THOU/MM3 (ref 4.8–10.8)

## 2022-01-20 PROCEDURE — 93010 ELECTROCARDIOGRAM REPORT: CPT | Performed by: NUCLEAR MEDICINE

## 2022-01-20 PROCEDURE — 85025 COMPLETE CBC W/AUTO DIFF WBC: CPT

## 2022-01-20 PROCEDURE — 36415 COLL VENOUS BLD VENIPUNCTURE: CPT

## 2022-01-20 PROCEDURE — 93005 ELECTROCARDIOGRAM TRACING: CPT

## 2022-01-20 PROCEDURE — 87086 URINE CULTURE/COLONY COUNT: CPT

## 2022-01-20 PROCEDURE — 80048 BASIC METABOLIC PNL TOTAL CA: CPT

## 2022-01-20 NOTE — TELEPHONE ENCOUNTER
----- Message from [employer] sent at 1/19/2022  4:29 PM EST -----  Subject: Message to Provider    QUESTIONS  Information for Provider? this message is for [facility], Patient returning   office's call at 4:28. Please call him back at [phone].  ---------------------------------------------------------------------------  --------------  CALL BACK INFO  What is the best way for the office to contact you? OK to leave message on   voicemail  Preferred Call Back Phone Number? [phone]  ---------------------------------------------------------------------------  --------------  SCRIPT ANSWERS  Relationship to Patient?  Self

## 2022-01-22 LAB — URINE CULTURE, ROUTINE: NORMAL

## 2022-01-24 ENCOUNTER — OFFICE VISIT (OUTPATIENT)
Dept: FAMILY MEDICINE CLINIC | Age: 62
End: 2022-01-24
Payer: COMMERCIAL

## 2022-01-24 VITALS
TEMPERATURE: 98.1 F | HEART RATE: 76 BPM | HEIGHT: 69 IN | DIASTOLIC BLOOD PRESSURE: 66 MMHG | RESPIRATION RATE: 16 BRPM | WEIGHT: 212 LBS | BODY MASS INDEX: 31.4 KG/M2 | OXYGEN SATURATION: 99 % | SYSTOLIC BLOOD PRESSURE: 108 MMHG

## 2022-01-24 DIAGNOSIS — Z01.818 PREOP EXAMINATION: Primary | ICD-10-CM

## 2022-01-24 DIAGNOSIS — I10 PRIMARY HYPERTENSION: Chronic | ICD-10-CM

## 2022-01-24 DIAGNOSIS — C61 PROSTATE CANCER (HCC): ICD-10-CM

## 2022-01-24 DIAGNOSIS — E10.649 TYPE 1 DIABETES MELLITUS WITH HYPOGLYCEMIA AND WITHOUT COMA (HCC): ICD-10-CM

## 2022-01-24 PROCEDURE — 99212 OFFICE O/P EST SF 10 MIN: CPT | Performed by: FAMILY MEDICINE

## 2022-01-26 ENCOUNTER — HOSPITAL ENCOUNTER (OUTPATIENT)
Dept: PHYSICAL THERAPY | Age: 62
Setting detail: THERAPIES SERIES
Discharge: HOME | End: 2022-01-26
Payer: COMMERCIAL

## 2022-01-26 PROCEDURE — 97112 NEUROMUSCULAR REEDUCATION: CPT | Performed by: PHYSICAL THERAPIST

## 2022-01-26 PROCEDURE — 97530 THERAPEUTIC ACTIVITIES: CPT | Performed by: PHYSICAL THERAPIST

## 2022-01-26 PROCEDURE — 97161 PT EVAL LOW COMPLEX 20 MIN: CPT | Performed by: PHYSICAL THERAPIST

## 2022-01-26 NOTE — PROGRESS NOTES
** PLEASE SIGN, DATE AND TIME CERTIFICATION BELOW AND RETURN TO [facility] OUTPATIENT REHABILITATION (FAX #: [phone]). ATTEST/CO-SIGN IF ACCESSING VIA [employer]. THANK YOU.**    I certify that I have examined the patient below and determined that Physical Medicine and Rehabilitation service is necessary and that I approve the established plan of care for up to 90 days or as specifically noted. Attestation, signature or co-signature of physician indicates approval of certification requirements.    ________________________ ____________ __________  Physician Signature   Date   Time  [facility]  PHYSICAL THERAPY  OUTPATIENT REHABILITATION - SPECIALIZED THERAPY SERVICES  [x] PELVIC HEALTH EVALUATION  [] DAILY NOTE  [] PROGRESS NOTE [] DISCHARGE NOTE    Date: 2022  Patient Name:  Kristina Kay  : 1960  MRN: [id number]  CSN: [id number]    Referring Practitioner Ariadna Barnard MD   Diagnosis Malignant neoplasm of prostate [C61]    Treatment Diagnosis M62.58 - Muscle Wasting and Atrophy, nec, other site  M62.89 - Other Specified Disorders of Muscle   Date of Evaluation 22    Additional Pertinent History HTN, Type 1 DM      Functional Outcome Measure Used IIQ and LETICIA   Functional Outcome Score 1 and 0 (22)       Insurance: Primary: Payor: [employer]ie [employer] /  /  / ,   Secondary:    Authorization Information: No pre-cert req'd   Visit # 1, 1/10 for progress note   Visits Allowed: 61 visits per calendar year   Recertification Date:    Physician Follow-Up:    Physician Orders: eval and treat   History of Present Illness: RALP 2/3/2022     SUBJECTIVE: Pt is 64 yo male to have RALP 2/3/2022. CA discovered via routine PSA f/b biopsy. Pt denies signif pre-op urinary problems. Pt does have known enlarged prostate. Spouse Aurelia Garcia present. Social/Functional History and Current Status:  Medications and Allergies have been reviewed and are listed on Medical History Questionnaire.     Kory Yin Delia Sommer lives with spouse in a single story home with basement with stairs and a handrail to enter. Task Previous Current   ADLs  Independent Independent   IADL's Independent anticipate post op urinary incont and lack of bladder control   Ambulation Independent anticipate post op urinary incont and lack of bladder control   Transfers Independent anticipate post op urinary incont and lack of bladder control   Recreation Independent likes to be active with yard work and gardening   Community Integration Independent active with Sikh act's; ex's a few times per wk on TM or elliptical; crunches   Driving Active  Active    Work [employer]. Occupation: [employer]- ; no light duty options Full-Time.        PAIN-- pt denies consistent pain        BLADDER ASSESSMENT [] Deferred secondary to:   Daily Fluid Ingestion: 5-6 bottles water, 1 large coffee, 1c milk, occas mixed drink   Urination Frequency Times/Day: every 2 hrs or so  Times/Night: 1 usually   Volume Medium   Urge Sensation Normal    SYMPTOM QUESTIONNAIRE   Loses Urine Upon: none   Incontinence Volume: none   Frequency of Leakage: none   Wets the Bed: no   Burning/Pain with Urination: no   Difficulty Starting a Stream of Urine: no   Incomplete Emptying Occasional   Strain to Empty Bladder: no   Falling Out Feeling: n/a   Urinate more than 7 times/day: no   Use a form of Leakage Protection: no   Restrict Fluid Intake: no   Stream Strength Average       BOWEL ASSESSMENT [] Deferred secondary to:   Frequency: Qd usually   Most Common Stool Consistency: Normal to loose   SYMPTOM QUESTIONNAIRE   Strain to have a BM: no   Include fiber in your diet: no   Take laxatives/enemas regularly: no   Pain with BM: no   Strong urge to have BM: no   Leak/Stain Feces: no   Diarrhea often: no         SEXUAL ASSESSMENT [] Deferred secondary to:   Sexually Active yes: limited due to some ED   Pain with [place] (Dyspareunia) No pain reported VITALS [] Deferred secondary to:   Height 5' 10\"   Weight 210#       GENERAL ASSESSMENT   [] Deferred secondary to:   Palpation    Observation    Posture WFL, pt's L LE is at least 1\" longer than R   Range of Motion Lumbar and LE AROM WFL and without c/o   Strength B LE strength WNL and without c/o; abdominal strength 2/5   Gait WNL   Sensation WNL   Edema WNL   Balance/Fall History Denies balance or fall problems   Special Tests Neg SLR, Neg Homans           OBSERVATION  [] Deferred secondary to:   Patient Safety Spouse stayed in room for exam   Skin Condition intact   Urogenital Triangle No tenderness or tightness reported       PELVIC FLOOR EXTERNAL EXAM [] Deferred secondary to:   Exam perineal body   Sensation Intact   Muscle Localization Good - minus after instruction with some glut mm assist   Palpation/Tone Normal   Pelvic Floor Strength PERF:Power: 2-,Endurance: 3,Reps: 5,Flicks: 10   Relax after Contraction Normal       TREATMENT   Precautions:     X in shaded column indicates activity completed today   Modalities Parameters/  Location  Notes                     Manual Therapy Time/Technique  Notes                     Exercise/Intervention    Notes   Basic pelvic anatomy, nature of condition, PT POC 8 min  x    Instruction on precise PFM localization 15 min  x    Post op restrictions, precautions, contraindications 10 min  x    kegel-quick 1 sec 10 x Do one kegel option every 2 hrs in sitting or lying   kegel-hold 3 sec 10 x                                                Specific Interventions Next Treatment: reassess pt post op and progress program as per symptoms    Activity/Treatment Tolerance:  [x]  Patient tolerated treatment well  []  Patient limited by fatigue  []  Patient limited by pain   []  Patient limited by medical complications  []  Other:     Patient Education:   [x]  HEP/Education Completed: PT plan of care, Pelvic Floor Musculature anatomy with instruction on precise localization. Patient to start doing 10 reps of kegels every 2 hours in sitting or lying alternating quicks with holds. Handout provided. and Instructed on post-op precautions and contraindications, expectations, appropriate post op activity levels, post op fluid intake, signs and symptoms of DVT. Handouts provided. []  No new Education completed  []  Reviewed Prior HEP      [x]  Patient verbalized and/or demonstrated understanding of education provided. []  Patient unable to verbalize and/or demonstrate understanding of education provided. Will continue education. [x]  Barriers to learning: none    Assessment: Patient is in need of skilled PT to be instructed on precise Pelvic Floor Musculature (PFM) localization and to initiate appropriate home routine to prepare for upcoming surgical intervention. Patient is also in need of instructions on post op precautions, contraindications, expectations, activity levels, and general plan of care. Patient will require PT post op to address urinary incontinence, rehabilitation of the PFM and abdominal muscles, scar management, and bladder retraining. Body Structures/Functions/Activity Limitations: Upcoming RALP which will result with urinary incontinence and lack of bladder control, PFM weakness with decreased localization and endurance, Decreased awareness of functional factors that increase pelvic organ strain, Decreased awareness of normal bladder habits, control, and diet effects on functioning, Abdominal and core weakness, Decreased awareness of safe means of improving abdom strength and stability, Impaired endurance and Impaired strength  Prognosis: Good    GOALS:  Patient Goal: prepare for upcoming surgical intervention    Goal:  1 visit. Pt will be independent with basic HEP for PFM strengthening and localization of PFM and to fully understand post-op precautions and POC.   MET    PLAN:  Treatment Recommendations: Strengthening, Functional Mobility Training, Endurance Training, Neuromuscular Re-education, Manual Therapy - Soft Tissue Mobilization, Pain Management, Home Exercise Program, Patient Education and Self-Care Education and Training    [x]  Plan of care initiated. Plan to see patient 1 time pre op f/b 1 time every 2 weeks post op for 12 weeks post op to address the treatment planned outlined above.   []  Continue with current plan of care  []  Modify plan of care as follows:    []  Hold pending physician visit  []  Discharge    Time In 9:00   Time Out 10:00   Timed Code Minutes: 45 min   Total Treatment Time: 60 min       Electronically Signed by: JOSESITO MANUEL

## 2022-01-27 ENCOUNTER — TELEPHONE (OUTPATIENT)
Dept: UROLOGY | Age: 62
End: 2022-01-27

## 2022-01-27 ASSESSMENT — ENCOUNTER SYMPTOMS
VOMITING: 0
NAUSEA: 0
DIARRHEA: 0
ABDOMINAL PAIN: 0
WHEEZING: 0
EYES NEGATIVE: 1
CONSTIPATION: 0
SORE THROAT: 0
COUGH: 0

## 2022-01-27 NOTE — PROGRESS NOTES
[facility]  [address] [place] [id number]  Dept: [phone]  Dept Fax: [phone]  Loc: [phone]  PROGRESS NOTE      VisitDate: 1/24/2022    Anthony Garcia is a 63 y.o. male who presents today for:     Chief Complaint   Patient presents with    Pre-op Exam     Robotic Assisted Laparoscopic Radical Prostatectomy with Bilateral Lymph Node Dissection with Dr Charity Spencer on 2/3/22         Subjective:  HPI  Patient comes in for preop evaluation for upcoming prostate surgery. No history of reactions anesthesia. No history of bleeding or clotting disorders. History of type 1 diabetes well controlled. History of hypertension stable controlled history of hyperlipidemia stable. Review of Systems   Constitutional: Negative for chills and fever. HENT: Negative for congestion and sore throat. Eyes: Negative. Respiratory: Negative for cough and wheezing. Cardiovascular: Negative for palpitations and leg swelling. Gastrointestinal: Negative for abdominal pain, constipation, diarrhea, nausea and vomiting. Genitourinary: Negative for dysuria and frequency. Musculoskeletal: Negative for myalgias. Skin: Negative for rash. Neurological: Negative for dizziness and headaches. Past Medical History:   Diagnosis Date    Hyperlipidemia     Hypertension     Type 1 diabetes mellitus with hypoglycemia without coma [facility])       Past Surgical History:   Procedure Laterality Date    COLONOSCOPY  2/11/13         Family History   Problem Relation Age of Onset    Cancer Sister     Diabetes Paternal Grandfather      Social History     Tobacco Use    Smoking status: Never Smoker    Smokeless tobacco: Never Used   Substance Use Topics    Alcohol use:  Yes     Alcohol/week: 6.0 standard drinks     Types: 2 Cans of beer, 2 Shots of liquor, 2 Glasses of wine per week     Comment: occas      Current Outpatient Medications   Medication Sig Dispense Refill    insulin aspart (NOVOLOG FLEXPEN) 100 UNIT/ML injection pen INJECT 0 TO 30 UNITS SUBCUTEANOUSLY THREE TIMES A DAY PER SLIDING SCALE. 30 mL 5    insulin glargine (LANTUS SOLOSTAR) 100 UNIT/ML injection pen INJECT 22 UNITS INTO THE SKIN NIGHTLY 45 mL 1    simvastatin (ZOCOR) 40 MG tablet take 1 tablet by mouth every evening 90 tablet 3    losartan (COZAAR) 100 MG tablet take 1 tablet by mouth once daily (Patient taking differently: nightly take 1 tablet by mouth once daily) 90 tablet 3    hydroCHLOROthiazide (HYDRODIURIL) 25 MG tablet Take 1 tablet by mouth every morning 90 tablet 3    amLODIPine (NORVASC) 10 MG tablet take 1 tablet by mouth once daily (Patient taking differently: nightly take 1 tablet by mouth once daily) 90 tablet 3    blood glucose monitor strips Test 1time a day & as needed for symptoms of irregular blood glucose. Dispense sufficient amount for indicated testing frequency plus additional to accommodate PRN testing needs. 100 strip 3    Continuous Blood Gluc Sensor (DEXCOM G6 SENSOR) MISC Change Sensor every 7-10 days Diagnosis:E11.9 Diagnosis:E11.9 12 each 3    Continuous Blood Gluc  (DEXCOM G6 ) LUDWIN Test blood sugar 8 times daily and as needed Diagnosis:E11.9 1 Device 0    Continuous Blood Gluc Transmit (DEXCOM G6 TRANSMITTER) MISC Change transmitter every 7-10 days Diagnosis:E11.9 12 each 3    aspirin 81 MG EC tablet Take 81 mg by mouth daily        Multiple Vitamins-Minerals (THERAPEUTIC MULTIVITAMIN-MINERALS) tablet Take 1 tablet by mouth daily       No current facility-administered medications for this visit.      No Known Allergies  Health Maintenance   Topic Date Due    HIV screen  Never done    Hepatitis B vaccine (1 of 3 - Risk 3-dose series) Never done    DTaP/Tdap/Td vaccine (1 - Tdap) 11/29/2015    Shingles Vaccine (2 of 2) 12/15/2021    Diabetic microalbuminuria test  04/10/2022    Diabetic foot exam  04/20/2022    Depression Screen 04/20/2022    Diabetic retinal exam  08/18/2022    A1C test (Diabetic or Prediabetic)  09/25/2022    Lipid screen  09/25/2022    PSA counseling  09/25/2022    Potassium monitoring  01/20/2023    Creatinine monitoring  01/20/2023    Colon cancer screen colonoscopy  02/11/2023    Pneumococcal 0-64 years Vaccine (2 of 2 - PPSV23) 07/08/2027    Flu vaccine  Completed    COVID-19 Vaccine  Completed    Hepatitis C screen  Completed    Hepatitis A vaccine  Aged Out    Hib vaccine  Aged Out    Meningococcal (ACWY) vaccine  Aged Out         Objective:     Physical Exam  Vitals reviewed. Constitutional:       General: He is not in acute distress. HENT:      Mouth/Throat:      Pharynx: No oropharyngeal exudate. Eyes:      Conjunctiva/sclera: Conjunctivae normal.   Neck:      Thyroid: No thyromegaly. Comments: No carotid bruits  Cardiovascular:      Rate and Rhythm: Normal rate and regular rhythm. Heart sounds: Normal heart sounds. No murmur heard. Pulmonary:      Breath sounds: Normal breath sounds. No wheezing or rales. Abdominal:      General: Bowel sounds are normal. There is no distension. Palpations: Abdomen is soft. There is no mass. Tenderness: There is no abdominal tenderness. There is no guarding or rebound. Musculoskeletal:         General: No tenderness. Normal range of motion. Lymphadenopathy:      Cervical: No cervical adenopathy. Skin:     General: Skin is dry. Findings: No rash. Neurological:      Mental Status: He is alert and oriented to person, place, and time. Cranial Nerves: No cranial nerve deficit. Deep Tendon Reflexes: Reflexes are normal and symmetric.        /66   Pulse 76   Temp 98.1 °F (36.7 °C) (Oral)   Resp 16   Ht 5' 8.75\" (1.746 m)   Wt 212 lb (96.2 kg)   SpO2 99%   BMI 31.54 kg/m²   EKG sinus bradycardia no ischemic changes  Lab Results   Component Value Date     01/20/2022    K 4.9 01/20/2022     01/20/2022    CO2 26 01/20/2022    BUN 14 01/20/2022    CREATININE 0.8 01/20/2022    GLUCOSE 105 01/20/2022    GLUCOSE 93 09/30/2014    CALCIUM 10.0 01/20/2022      Lab Results   Component Value Date    WBC 5.1 01/20/2022    HGB 15.0 01/20/2022    HCT 45.3 01/20/2022    MCV 94.2 (H) 01/20/2022     01/20/2022         Impression/Plan:  1. Preop examination    2. Prostate cancer ([facility] [address]    3. Type 1 diabetes mellitus with hypoglycemia and without coma ([facility] [address]    4. Primary hypertension      Requested Prescriptions      No prescriptions requested or ordered in this encounter     No orders of the defined types were placed in this encounter. No contraindication to planned surgery. Patient is cleared for  Patient giveneducational materials - see patient instructions. Discussed use, benefit, and side effects of prescribed medications. All patient questions answered. Pt voiced understanding. Reviewed health maintenance. Patient agreedwith treatment plan. Follow up as directed. **This report has been created using voice recognition software. It may contain minor errorswhich are inherent in voice recognition technology. **       Electronically signed by Jose Musa MD on 1/27/2022 at 1:58 PM

## 2022-01-27 NOTE — TELEPHONE ENCOUNTER
Patient was seen on 1/24/22 for medical clearance. Just checking on the status for his 2/3/22 surgery.  Thanks

## 2022-02-02 ENCOUNTER — TELEPHONE (OUTPATIENT)
Dept: UROLOGY | Age: 62
End: 2022-02-02

## 2022-02-02 NOTE — TELEPHONE ENCOUNTER
Patient's surgery with Dr Shanta Fuentes on 2/3/22 cancelled due to weather.  Will reach out to the patient to reschedule

## 2022-02-09 ENCOUNTER — TELEPHONE (OUTPATIENT)
Dept: UROLOGY | Age: 62
End: 2022-02-09

## 2022-02-09 DIAGNOSIS — Z01.818 PRE-OP TESTING: Primary | ICD-10-CM

## 2022-02-09 DIAGNOSIS — C61 PROSTATE CANCER (HCC): ICD-10-CM

## 2022-02-09 NOTE — TELEPHONE ENCOUNTER
Patient's surgery rescheduled to 3/16/22 with Dr Tala Casanova. Surgery consent on arrival. Repeat urine culture on 3/2/22. Arrival to hospitals at 10:00 am. Patient informed and voiced understanding.

## 2022-02-09 NOTE — TELEPHONE ENCOUNTER
Robotic Surgery Scheduling Form              [facility] [address]                          Phone * [phone]              Surgical Scheduling Direct line Phone * [phone]  Fax * [phone]        Jyotsna Randolph                                                             1960                       male     [address] [place] [address]     Marital Status:                Home Phone: [phone]   Cell Phone:       Telephone Information:   Mobile [phone]                       Surgeon: Dr. Lety Foster          Surgery Date:3/16/22           Time: 12:00pm                Procedure: Robotic Assisted Laparscopic Radical Prostatectomy with Bilateral Lymph Node Disection          Outpatient/OBS              Diagnosis: Prostate Cancer     Important Medical History: IN EPIC     Special Inst/Equip: XI     CPT Codes: 01558,86282     Latex Allergy:   no     Cardiac Device:  no     Case Location:                      Main OR               Preadmission Testing:  Phone Call     PAT Date and Time:  ________________________________    PAT Confirmation #: _________________________________     Post Op Visit:  ______________________________________     Need Preop Cardiac Clearance:   no    Does patient have Cardiologist/physician?   Dr. Ruby Khan to clear     Surgery Conformation #:  ______________________________________________     : __________________________________ Date:____________________     Firefly:  No    Dual Console:  No   Ultrasound:  No    Single Site: No     RNFA (colon resection only):             Assisting Surgeon:  Rowena Canchola, [address] Name:  Desiree Burks

## 2022-02-15 ENCOUNTER — PREP FOR PROCEDURE (OUTPATIENT)
Dept: UROLOGY | Age: 62
End: 2022-02-15

## 2022-02-15 RX ORDER — SODIUM CHLORIDE 9 MG/ML
INJECTION, SOLUTION INTRAVENOUS CONTINUOUS
Status: CANCELLED | OUTPATIENT
Start: 2022-03-16

## 2022-02-18 RX ORDER — SODIUM CHLORIDE 9 MG/ML
INJECTION, SOLUTION INTRAVENOUS CONTINUOUS
Status: CANCELLED | OUTPATIENT
Start: 2022-03-16

## 2022-03-02 ENCOUNTER — HOSPITAL ENCOUNTER (OUTPATIENT)
Age: 62
Discharge: HOME | End: 2022-03-02
Payer: COMMERCIAL

## 2022-03-02 ENCOUNTER — APPOINTMENT (OUTPATIENT)
Dept: PHYSICAL THERAPY | Age: 62
End: 2022-03-02
Payer: COMMERCIAL

## 2022-03-02 DIAGNOSIS — Z01.818 PRE-OP TESTING: ICD-10-CM

## 2022-03-02 DIAGNOSIS — C61 PROSTATE CANCER (HCC): ICD-10-CM

## 2022-03-02 PROCEDURE — 87086 URINE CULTURE/COLONY COUNT: CPT

## 2022-03-04 LAB — URINE CULTURE, ROUTINE: NORMAL

## 2022-03-08 NOTE — PROGRESS NOTES
PAT call attempted, patient unavailable, left message to please call us back at your earliest convenience; [phone]

## 2022-03-08 NOTE — PROGRESS NOTES
Follow all instructions given by your physician    NPO after midnight   Sips of water am of surgery with allowed medications  Bring insurance info and 's license  Wear comfortable clean clothing  No jewelry or contact lenses to be worn day of surgery  No glue on dentures morning of surgery;you will be asked to remove them for surgery. Case for glasses. Shower night before and morning of surgery with a liquid antibacterial soap, dry with fresh clean towel; no lotions, creams or powder. Clean sheets and pillow case on bed night before surgery  Bring medications in original bottles    Report to SDS on 2nd floor  If you would become ill prior to surgery, please call the surgeon  May have a visitor with you, we request that you limit to 2 visitors in pre-op area  Please bring and wear mask  Call [phone] for any questions  Covid questionnaire Complete; Patient negative for symptoms or exposure. See documentation.

## 2022-03-16 ENCOUNTER — HOSPITAL ENCOUNTER (OUTPATIENT)
Age: 62
Discharge: HOME | End: 2022-03-18
Attending: UROLOGY | Admitting: UROLOGY
Payer: COMMERCIAL

## 2022-03-16 ENCOUNTER — ANESTHESIA (OUTPATIENT)
Dept: OPERATING ROOM | Age: 62
End: 2022-03-16
Payer: COMMERCIAL

## 2022-03-16 ENCOUNTER — ANESTHESIA EVENT (OUTPATIENT)
Dept: OPERATING ROOM | Age: 62
End: 2022-03-16
Payer: COMMERCIAL

## 2022-03-16 VITALS — TEMPERATURE: 96.3 F | OXYGEN SATURATION: 97 % | DIASTOLIC BLOOD PRESSURE: 69 MMHG | SYSTOLIC BLOOD PRESSURE: 128 MMHG

## 2022-03-16 DIAGNOSIS — G89.18 POST-OPERATIVE PAIN: Primary | ICD-10-CM

## 2022-03-16 PROBLEM — C61 PROSTATE CANCER (HCC): Status: ACTIVE | Noted: 2022-03-16

## 2022-03-16 LAB
ANION GAP SERPL CALCULATED.3IONS-SCNC: 15 MEQ/L (ref 8–16)
BASOPHILS # BLD: 0.2 %
BASOPHILS ABSOLUTE: 0 THOU/MM3 (ref 0–0.1)
BUN BLDV-MCNC: 21 MG/DL (ref 7–22)
CALCIUM SERPL-MCNC: 8.7 MG/DL (ref 8.5–10.5)
CHLORIDE BLD-SCNC: 101 MEQ/L (ref 98–111)
CO2: 19 MEQ/L (ref 23–33)
CREAT SERPL-MCNC: 1 MG/DL (ref 0.4–1.2)
EOSINOPHIL # BLD: 0.1 %
EOSINOPHILS ABSOLUTE: 0 THOU/MM3 (ref 0–0.4)
ERYTHROCYTE [DISTWIDTH] IN BLOOD BY AUTOMATED COUNT: 12.7 % (ref 11.5–14.5)
ERYTHROCYTE [DISTWIDTH] IN BLOOD BY AUTOMATED COUNT: 44.1 FL (ref 35–45)
GFR SERPL CREATININE-BSD FRML MDRD: 76 ML/MIN/1.73M2
GLUCOSE BLD-MCNC: 259 MG/DL (ref 70–108)
GLUCOSE BLD-MCNC: 288 MG/DL (ref 70–108)
GLUCOSE BLD-MCNC: 293 MG/DL (ref 70–108)
GLUCOSE BLD-MCNC: 331 MG/DL (ref 70–108)
HCT VFR BLD CALC: 43.3 % (ref 42–52)
HEMOGLOBIN: 14.2 GM/DL (ref 14–18)
IMMATURE GRANS (ABS): 0.05 THOU/MM3 (ref 0–0.07)
IMMATURE GRANULOCYTES: 0.4 %
LYMPHOCYTES # BLD: 3.6 %
LYMPHOCYTES ABSOLUTE: 0.4 THOU/MM3 (ref 1–4.8)
MCH RBC QN AUTO: 31.2 PG (ref 26–33)
MCHC RBC AUTO-ENTMCNC: 32.8 GM/DL (ref 32.2–35.5)
MCV RBC AUTO: 95.2 FL (ref 80–94)
MONOCYTES # BLD: 5.2 %
MONOCYTES ABSOLUTE: 0.6 THOU/MM3 (ref 0.4–1.3)
NUCLEATED RED BLOOD CELLS: 0 /100 WBC
PLATELET # BLD: 227 THOU/MM3 (ref 130–400)
PMV BLD AUTO: 9.2 FL (ref 9.4–12.4)
POTASSIUM SERPL-SCNC: 4.2 MEQ/L (ref 3.5–5.2)
RBC # BLD: 4.55 MILL/MM3 (ref 4.7–6.1)
SEG NEUTROPHILS: 90.5 %
SEGMENTED NEUTROPHILS ABSOLUTE COUNT: 10 THOU/MM3 (ref 1.8–7.7)
SODIUM BLD-SCNC: 135 MEQ/L (ref 135–145)
WBC # BLD: 11.1 THOU/MM3 (ref 4.8–10.8)

## 2022-03-16 PROCEDURE — 80048 BASIC METABOLIC PNL TOTAL CA: CPT

## 2022-03-16 PROCEDURE — 88305 TISSUE EXAM BY PATHOLOGIST: CPT

## 2022-03-16 PROCEDURE — 3700000000 HC ANESTHESIA ATTENDED CARE: Performed by: UROLOGY

## 2022-03-16 PROCEDURE — 2500000003 HC RX 250 WO HCPCS: Performed by: NURSE ANESTHETIST, CERTIFIED REGISTERED

## 2022-03-16 PROCEDURE — 85025 COMPLETE CBC W/AUTO DIFF WBC: CPT

## 2022-03-16 PROCEDURE — 6360000002 HC RX W HCPCS: Performed by: UROLOGY

## 2022-03-16 PROCEDURE — 38571 LAPAROSCOPY LYMPHADENECTOMY: CPT | Performed by: PHYSICIAN ASSISTANT

## 2022-03-16 PROCEDURE — S2900 ROBOTIC SURGICAL SYSTEM: HCPCS | Performed by: UROLOGY

## 2022-03-16 PROCEDURE — 7100000001 HC PACU RECOVERY - ADDTL 15 MIN: Performed by: UROLOGY

## 2022-03-16 PROCEDURE — 2500000003 HC RX 250 WO HCPCS: Performed by: UROLOGY

## 2022-03-16 PROCEDURE — 6370000000 HC RX 637 (ALT 250 FOR IP): Performed by: PHYSICIAN ASSISTANT

## 2022-03-16 PROCEDURE — 7100000000 HC PACU RECOVERY - FIRST 15 MIN: Performed by: UROLOGY

## 2022-03-16 PROCEDURE — 2580000003 HC RX 258: Performed by: PHYSICIAN ASSISTANT

## 2022-03-16 PROCEDURE — 2709999900 HC NON-CHARGEABLE SUPPLY: Performed by: UROLOGY

## 2022-03-16 PROCEDURE — 55866 LAPS SURG PRST8ECT RPBIC RAD: CPT | Performed by: PHYSICIAN ASSISTANT

## 2022-03-16 PROCEDURE — 82948 REAGENT STRIP/BLOOD GLUCOSE: CPT

## 2022-03-16 PROCEDURE — 3600000019 HC SURGERY ROBOT ADDTL 15MIN: Performed by: UROLOGY

## 2022-03-16 PROCEDURE — 6360000002 HC RX W HCPCS: Performed by: PHYSICIAN ASSISTANT

## 2022-03-16 PROCEDURE — 2580000003 HC RX 258: Performed by: UROLOGY

## 2022-03-16 PROCEDURE — 3700000001 HC ADD 15 MINUTES (ANESTHESIA): Performed by: UROLOGY

## 2022-03-16 PROCEDURE — 88309 TISSUE EXAM BY PATHOLOGIST: CPT

## 2022-03-16 PROCEDURE — 36415 COLL VENOUS BLD VENIPUNCTURE: CPT

## 2022-03-16 PROCEDURE — 3600000009 HC SURGERY ROBOT BASE: Performed by: UROLOGY

## 2022-03-16 PROCEDURE — 6370000000 HC RX 637 (ALT 250 FOR IP): Performed by: ANESTHESIOLOGY

## 2022-03-16 PROCEDURE — 2500000003 HC RX 250 WO HCPCS: Performed by: REGISTERED NURSE

## 2022-03-16 PROCEDURE — 6360000002 HC RX W HCPCS: Performed by: NURSE ANESTHETIST, CERTIFIED REGISTERED

## 2022-03-16 PROCEDURE — C1713 ANCHOR/SCREW BN/BN,TIS/BN: HCPCS | Performed by: UROLOGY

## 2022-03-16 DEVICE — ALLOGRAFT HUM TISS 6X3 CM CRYOPRESERVED AMNIOX CLARIX CRD 1K: Type: IMPLANTABLE DEVICE | Site: PELVIS | Status: FUNCTIONAL

## 2022-03-16 RX ORDER — LABETALOL 20 MG/4 ML (5 MG/ML) INTRAVENOUS SYRINGE
10
Status: DISCONTINUED | OUTPATIENT
Start: 2022-03-16 | End: 2022-03-16 | Stop reason: HOSPADM

## 2022-03-16 RX ORDER — DEXTROSE MONOHYDRATE 100 MG/ML
125 INJECTION, SOLUTION INTRAVENOUS PRN
Status: DISCONTINUED | OUTPATIENT
Start: 2022-03-16 | End: 2022-03-18 | Stop reason: HOSPADM

## 2022-03-16 RX ORDER — BACITRACIN, NEOMYCIN, POLYMYXIN B 400; 3.5; 5 [USP'U]/G; MG/G; [USP'U]/G
OINTMENT TOPICAL 2 TIMES DAILY
Status: DISCONTINUED | OUTPATIENT
Start: 2022-03-16 | End: 2022-03-18 | Stop reason: HOSPADM

## 2022-03-16 RX ORDER — ONDANSETRON 2 MG/ML
4 INJECTION INTRAMUSCULAR; INTRAVENOUS EVERY 6 HOURS PRN
Status: DISCONTINUED | OUTPATIENT
Start: 2022-03-16 | End: 2022-03-18 | Stop reason: HOSPADM

## 2022-03-16 RX ORDER — MORPHINE SULFATE 4 MG/ML
4 INJECTION, SOLUTION INTRAMUSCULAR; INTRAVENOUS
Status: DISCONTINUED | OUTPATIENT
Start: 2022-03-16 | End: 2022-03-18 | Stop reason: HOSPADM

## 2022-03-16 RX ORDER — SODIUM CHLORIDE 0.9 % (FLUSH) 0.9 %
5-40 SYRINGE (ML) INJECTION PRN
Status: DISCONTINUED | OUTPATIENT
Start: 2022-03-16 | End: 2022-03-16 | Stop reason: HOSPADM

## 2022-03-16 RX ORDER — MORPHINE SULFATE 2 MG/ML
2 INJECTION, SOLUTION INTRAMUSCULAR; INTRAVENOUS EVERY 5 MIN PRN
Status: DISCONTINUED | OUTPATIENT
Start: 2022-03-16 | End: 2022-03-16 | Stop reason: HOSPADM

## 2022-03-16 RX ORDER — INSULIN GLARGINE 100 [IU]/ML
22 INJECTION, SOLUTION SUBCUTANEOUS NIGHTLY
Status: DISCONTINUED | OUTPATIENT
Start: 2022-03-17 | End: 2022-03-17

## 2022-03-16 RX ORDER — INSULIN GLARGINE 100 [IU]/ML
11 INJECTION, SOLUTION SUBCUTANEOUS NIGHTLY
Status: COMPLETED | OUTPATIENT
Start: 2022-03-16 | End: 2022-03-16

## 2022-03-16 RX ORDER — ONDANSETRON 2 MG/ML
INJECTION INTRAMUSCULAR; INTRAVENOUS PRN
Status: DISCONTINUED | OUTPATIENT
Start: 2022-03-16 | End: 2022-03-16 | Stop reason: SDUPTHER

## 2022-03-16 RX ORDER — SODIUM CHLORIDE 0.9 % (FLUSH) 0.9 %
5-40 SYRINGE (ML) INJECTION EVERY 12 HOURS SCHEDULED
Status: DISCONTINUED | OUTPATIENT
Start: 2022-03-16 | End: 2022-03-16 | Stop reason: HOSPADM

## 2022-03-16 RX ORDER — BUPIVACAINE HYDROCHLORIDE 5 MG/ML
INJECTION, SOLUTION EPIDURAL; INTRACAUDAL PRN
Status: DISCONTINUED | OUTPATIENT
Start: 2022-03-16 | End: 2022-03-16 | Stop reason: ALTCHOICE

## 2022-03-16 RX ORDER — AMLODIPINE BESYLATE 10 MG/1
10 TABLET ORAL NIGHTLY
Status: DISCONTINUED | OUTPATIENT
Start: 2022-03-16 | End: 2022-03-18 | Stop reason: HOSPADM

## 2022-03-16 RX ORDER — SODIUM CHLORIDE 0.9 % (FLUSH) 0.9 %
5-40 SYRINGE (ML) INJECTION PRN
Status: DISCONTINUED | OUTPATIENT
Start: 2022-03-16 | End: 2022-03-18 | Stop reason: HOSPADM

## 2022-03-16 RX ORDER — ROCURONIUM BROMIDE 10 MG/ML
INJECTION, SOLUTION INTRAVENOUS PRN
Status: DISCONTINUED | OUTPATIENT
Start: 2022-03-16 | End: 2022-03-16 | Stop reason: SDUPTHER

## 2022-03-16 RX ORDER — CEFAZOLIN SODIUM 2 G/100ML
2000 INJECTION, SOLUTION INTRAVENOUS EVERY 8 HOURS
Status: DISCONTINUED | OUTPATIENT
Start: 2022-03-16 | End: 2022-03-16 | Stop reason: SDUPTHER

## 2022-03-16 RX ORDER — FENTANYL CITRATE 50 UG/ML
INJECTION, SOLUTION INTRAMUSCULAR; INTRAVENOUS PRN
Status: DISCONTINUED | OUTPATIENT
Start: 2022-03-16 | End: 2022-03-16 | Stop reason: SDUPTHER

## 2022-03-16 RX ORDER — LOSARTAN POTASSIUM 100 MG/1
100 TABLET ORAL NIGHTLY
Status: DISCONTINUED | OUTPATIENT
Start: 2022-03-16 | End: 2022-03-18 | Stop reason: HOSPADM

## 2022-03-16 RX ORDER — SENNA AND DOCUSATE SODIUM 50; 8.6 MG/1; MG/1
1 TABLET, FILM COATED ORAL 2 TIMES DAILY
Status: DISCONTINUED | OUTPATIENT
Start: 2022-03-16 | End: 2022-03-18 | Stop reason: HOSPADM

## 2022-03-16 RX ORDER — HYDROMORPHONE HCL 110MG/55ML
PATIENT CONTROLLED ANALGESIA SYRINGE INTRAVENOUS PRN
Status: DISCONTINUED | OUTPATIENT
Start: 2022-03-16 | End: 2022-03-16 | Stop reason: SDUPTHER

## 2022-03-16 RX ORDER — PROPOFOL 10 MG/ML
INJECTION, EMULSION INTRAVENOUS PRN
Status: DISCONTINUED | OUTPATIENT
Start: 2022-03-16 | End: 2022-03-16 | Stop reason: SDUPTHER

## 2022-03-16 RX ORDER — SODIUM CHLORIDE 9 MG/ML
INJECTION, SOLUTION INTRAVENOUS CONTINUOUS
Status: DISCONTINUED | OUTPATIENT
Start: 2022-03-16 | End: 2022-03-18 | Stop reason: HOSPADM

## 2022-03-16 RX ORDER — DEXTROSE MONOHYDRATE 100 MG/ML
250 INJECTION, SOLUTION INTRAVENOUS PRN
Status: DISCONTINUED | OUTPATIENT
Start: 2022-03-16 | End: 2022-03-18 | Stop reason: HOSPADM

## 2022-03-16 RX ORDER — ONDANSETRON 4 MG/1
4 TABLET, ORALLY DISINTEGRATING ORAL EVERY 8 HOURS PRN
Status: DISCONTINUED | OUTPATIENT
Start: 2022-03-16 | End: 2022-03-18 | Stop reason: HOSPADM

## 2022-03-16 RX ORDER — FENTANYL CITRATE 50 UG/ML
50 INJECTION, SOLUTION INTRAMUSCULAR; INTRAVENOUS EVERY 5 MIN PRN
Status: DISCONTINUED | OUTPATIENT
Start: 2022-03-16 | End: 2022-03-16 | Stop reason: HOSPADM

## 2022-03-16 RX ORDER — SUCCINYLCHOLINE/SOD CL,ISO/PF 200MG/10ML
SYRINGE (ML) INTRAVENOUS PRN
Status: DISCONTINUED | OUTPATIENT
Start: 2022-03-16 | End: 2022-03-16 | Stop reason: SDUPTHER

## 2022-03-16 RX ORDER — HYDROCODONE BITARTRATE AND ACETAMINOPHEN 5; 325 MG/1; MG/1
1 TABLET ORAL EVERY 4 HOURS PRN
Status: DISCONTINUED | OUTPATIENT
Start: 2022-03-16 | End: 2022-03-18 | Stop reason: HOSPADM

## 2022-03-16 RX ORDER — EPHEDRINE SULFATE/0.9% NACL/PF 50 MG/5 ML
SYRINGE (ML) INTRAVENOUS PRN
Status: DISCONTINUED | OUTPATIENT
Start: 2022-03-16 | End: 2022-03-16 | Stop reason: SDUPTHER

## 2022-03-16 RX ORDER — SODIUM CHLORIDE 0.9 % (FLUSH) 0.9 %
5-40 SYRINGE (ML) INJECTION EVERY 12 HOURS SCHEDULED
Status: DISCONTINUED | OUTPATIENT
Start: 2022-03-16 | End: 2022-03-18 | Stop reason: HOSPADM

## 2022-03-16 RX ORDER — TROSPIUM CHLORIDE 20 MG/1
20 TABLET, FILM COATED ORAL 2 TIMES DAILY PRN
Status: DISCONTINUED | OUTPATIENT
Start: 2022-03-16 | End: 2022-03-18 | Stop reason: HOSPADM

## 2022-03-16 RX ORDER — SODIUM CHLORIDE 9 MG/ML
INJECTION, SOLUTION INTRAVENOUS CONTINUOUS
Status: DISCONTINUED | OUTPATIENT
Start: 2022-03-16 | End: 2022-03-16

## 2022-03-16 RX ORDER — DEXTROSE MONOHYDRATE 50 MG/ML
100 INJECTION, SOLUTION INTRAVENOUS PRN
Status: DISCONTINUED | OUTPATIENT
Start: 2022-03-16 | End: 2022-03-17 | Stop reason: SDUPTHER

## 2022-03-16 RX ORDER — DEXTROSE MONOHYDRATE 25 G/50ML
12.5 INJECTION, SOLUTION INTRAVENOUS PRN
Status: DISCONTINUED | OUTPATIENT
Start: 2022-03-16 | End: 2022-03-16

## 2022-03-16 RX ORDER — MORPHINE SULFATE 2 MG/ML
2 INJECTION, SOLUTION INTRAMUSCULAR; INTRAVENOUS
Status: DISCONTINUED | OUTPATIENT
Start: 2022-03-16 | End: 2022-03-18 | Stop reason: HOSPADM

## 2022-03-16 RX ORDER — HYDROCHLOROTHIAZIDE 25 MG/1
25 TABLET ORAL EVERY MORNING
Status: DISCONTINUED | OUTPATIENT
Start: 2022-03-17 | End: 2022-03-18 | Stop reason: HOSPADM

## 2022-03-16 RX ORDER — ATORVASTATIN CALCIUM 20 MG/1
20 TABLET, FILM COATED ORAL NIGHTLY
Status: DISCONTINUED | OUTPATIENT
Start: 2022-03-16 | End: 2022-03-16 | Stop reason: CLARIF

## 2022-03-16 RX ORDER — SIMVASTATIN 40 MG
40 TABLET ORAL NIGHTLY
Status: DISCONTINUED | OUTPATIENT
Start: 2022-03-16 | End: 2022-03-18 | Stop reason: HOSPADM

## 2022-03-16 RX ORDER — SODIUM CHLORIDE 9 MG/ML
25 INJECTION, SOLUTION INTRAVENOUS PRN
Status: DISCONTINUED | OUTPATIENT
Start: 2022-03-16 | End: 2022-03-18 | Stop reason: HOSPADM

## 2022-03-16 RX ORDER — NICOTINE POLACRILEX 4 MG
15 LOZENGE BUCCAL PRN
Status: DISCONTINUED | OUTPATIENT
Start: 2022-03-16 | End: 2022-03-16

## 2022-03-16 RX ORDER — MIDAZOLAM HYDROCHLORIDE 1 MG/ML
INJECTION INTRAMUSCULAR; INTRAVENOUS PRN
Status: DISCONTINUED | OUTPATIENT
Start: 2022-03-16 | End: 2022-03-16 | Stop reason: SDUPTHER

## 2022-03-16 RX ORDER — SODIUM CHLORIDE 9 MG/ML
25 INJECTION, SOLUTION INTRAVENOUS PRN
Status: DISCONTINUED | OUTPATIENT
Start: 2022-03-16 | End: 2022-03-16 | Stop reason: HOSPADM

## 2022-03-16 RX ORDER — HYDROCODONE BITARTRATE AND ACETAMINOPHEN 5; 325 MG/1; MG/1
2 TABLET ORAL EVERY 4 HOURS PRN
Status: DISCONTINUED | OUTPATIENT
Start: 2022-03-16 | End: 2022-03-18 | Stop reason: HOSPADM

## 2022-03-16 RX ORDER — ACETAMINOPHEN 325 MG/1
650 TABLET ORAL EVERY 6 HOURS
Status: DISCONTINUED | OUTPATIENT
Start: 2022-03-16 | End: 2022-03-18 | Stop reason: HOSPADM

## 2022-03-16 RX ADMIN — FENTANYL CITRATE 100 MCG: 50 INJECTION, SOLUTION INTRAMUSCULAR; INTRAVENOUS at 12:46

## 2022-03-16 RX ADMIN — SODIUM CHLORIDE: 9 INJECTION, SOLUTION INTRAVENOUS at 17:26

## 2022-03-16 RX ADMIN — AMLODIPINE BESYLATE 10 MG: 10 TABLET ORAL at 21:44

## 2022-03-16 RX ADMIN — HYDROMORPHONE HYDROCHLORIDE 0.5 MG: 2 INJECTION INTRAMUSCULAR; INTRAVENOUS; SUBCUTANEOUS at 15:41

## 2022-03-16 RX ADMIN — INSULIN GLARGINE 11 UNITS: 100 INJECTION, SOLUTION SUBCUTANEOUS at 19:59

## 2022-03-16 RX ADMIN — Medication 5 MG: at 14:04

## 2022-03-16 RX ADMIN — Medication 40 MG: at 21:45

## 2022-03-16 RX ADMIN — INSULIN HUMAN 15 UNITS: 100 INJECTION, SOLUTION PARENTERAL at 16:26

## 2022-03-16 RX ADMIN — HYDROMORPHONE HYDROCHLORIDE 1 MG: 2 INJECTION INTRAMUSCULAR; INTRAVENOUS; SUBCUTANEOUS at 13:32

## 2022-03-16 RX ADMIN — ONDANSETRON 4 MG: 2 INJECTION INTRAMUSCULAR; INTRAVENOUS at 13:16

## 2022-03-16 RX ADMIN — Medication 5 MG: at 13:02

## 2022-03-16 RX ADMIN — ROCURONIUM BROMIDE 20 MG: 50 INJECTION INTRAVENOUS at 13:36

## 2022-03-16 RX ADMIN — ROCURONIUM BROMIDE 10 MG: 50 INJECTION INTRAVENOUS at 15:16

## 2022-03-16 RX ADMIN — ROCURONIUM BROMIDE 50 MG: 50 INJECTION INTRAVENOUS at 12:59

## 2022-03-16 RX ADMIN — ROCURONIUM BROMIDE 20 MG: 50 INJECTION INTRAVENOUS at 14:16

## 2022-03-16 RX ADMIN — LOSARTAN POTASSIUM 100 MG: 100 TABLET ORAL at 21:44

## 2022-03-16 RX ADMIN — SODIUM CHLORIDE, PRESERVATIVE FREE 10 ML: 5 INJECTION INTRAVENOUS at 21:43

## 2022-03-16 RX ADMIN — CEFAZOLIN 2000 MG: 10 INJECTION, POWDER, FOR SOLUTION INTRAVENOUS at 19:54

## 2022-03-16 RX ADMIN — Medication 10 MG: at 13:19

## 2022-03-16 RX ADMIN — Medication 10 MG: at 13:01

## 2022-03-16 RX ADMIN — CEFAZOLIN SODIUM 2000 MG: 10 INJECTION, POWDER, FOR SOLUTION INTRAVENOUS at 12:50

## 2022-03-16 RX ADMIN — BACITRACIN ZINC NEOMYCIN SULFATE POLYMYXIN B SULFATE: 400; 3.5; 5 OINTMENT TOPICAL at 20:09

## 2022-03-16 RX ADMIN — ROCURONIUM BROMIDE 10 MG: 50 INJECTION INTRAVENOUS at 15:30

## 2022-03-16 RX ADMIN — SODIUM CHLORIDE: 9 INJECTION, SOLUTION INTRAVENOUS at 11:15

## 2022-03-16 RX ADMIN — SODIUM CHLORIDE: 9 INJECTION, SOLUTION INTRAVENOUS at 15:55

## 2022-03-16 RX ADMIN — Medication 10 MG: at 14:33

## 2022-03-16 RX ADMIN — Medication 120 MG: at 12:52

## 2022-03-16 RX ADMIN — Medication 10 MG: at 14:40

## 2022-03-16 RX ADMIN — SUGAMMADEX 200 MG: 100 INJECTION, SOLUTION INTRAVENOUS at 15:57

## 2022-03-16 RX ADMIN — Medication 100 MG: at 12:52

## 2022-03-16 RX ADMIN — ACETAMINOPHEN 650 MG: 325 TABLET ORAL at 20:08

## 2022-03-16 RX ADMIN — MIDAZOLAM 2 MG: 1 INJECTION INTRAMUSCULAR; INTRAVENOUS at 12:46

## 2022-03-16 RX ADMIN — PROPOFOL 150 MG: 10 INJECTION, EMULSION INTRAVENOUS at 12:52

## 2022-03-16 RX ADMIN — SENNOSIDES AND DOCUSATE SODIUM 1 TABLET: 50; 8.6 TABLET ORAL at 21:45

## 2022-03-16 RX ADMIN — HYDROMORPHONE HYDROCHLORIDE 0.5 MG: 2 INJECTION INTRAMUSCULAR; INTRAVENOUS; SUBCUTANEOUS at 16:01

## 2022-03-16 ASSESSMENT — PULMONARY FUNCTION TESTS
PIF_VALUE: 30
PIF_VALUE: 31
PIF_VALUE: 30
PIF_VALUE: 29
PIF_VALUE: 17
PIF_VALUE: 30
PIF_VALUE: 17
PIF_VALUE: 30
PIF_VALUE: 30
PIF_VALUE: 17
PIF_VALUE: 30
PIF_VALUE: 13
PIF_VALUE: 30
PIF_VALUE: 17
PIF_VALUE: 30
PIF_VALUE: 30
PIF_VALUE: 17
PIF_VALUE: 30
PIF_VALUE: 17
PIF_VALUE: 17
PIF_VALUE: 30
PIF_VALUE: 24
PIF_VALUE: 31
PIF_VALUE: 30
PIF_VALUE: 29
PIF_VALUE: 30
PIF_VALUE: 30
PIF_VALUE: 32
PIF_VALUE: 30
PIF_VALUE: 15
PIF_VALUE: 17
PIF_VALUE: 1
PIF_VALUE: 30
PIF_VALUE: 17
PIF_VALUE: 30
PIF_VALUE: 29
PIF_VALUE: 30
PIF_VALUE: 25
PIF_VALUE: 30
PIF_VALUE: 17
PIF_VALUE: 31
PIF_VALUE: 30
PIF_VALUE: 17
PIF_VALUE: 30
PIF_VALUE: 17
PIF_VALUE: 0
PIF_VALUE: 30
PIF_VALUE: 0
PIF_VALUE: 17
PIF_VALUE: 30
PIF_VALUE: 17
PIF_VALUE: 30
PIF_VALUE: 17
PIF_VALUE: 30
PIF_VALUE: 17
PIF_VALUE: 16
PIF_VALUE: 30
PIF_VALUE: 30
PIF_VALUE: 18
PIF_VALUE: 30
PIF_VALUE: 24
PIF_VALUE: 1
PIF_VALUE: 2
PIF_VALUE: 4
PIF_VALUE: 15
PIF_VALUE: 30
PIF_VALUE: 15
PIF_VALUE: 25
PIF_VALUE: 15
PIF_VALUE: 30
PIF_VALUE: 30
PIF_VALUE: 31
PIF_VALUE: 31
PIF_VALUE: 17
PIF_VALUE: 30
PIF_VALUE: 1
PIF_VALUE: 30
PIF_VALUE: 30
PIF_VALUE: 17
PIF_VALUE: 30
PIF_VALUE: 31
PIF_VALUE: 30
PIF_VALUE: 31
PIF_VALUE: 30
PIF_VALUE: 30
PIF_VALUE: 3
PIF_VALUE: 30
PIF_VALUE: 1
PIF_VALUE: 31
PIF_VALUE: 17
PIF_VALUE: 1
PIF_VALUE: 30
PIF_VALUE: 22
PIF_VALUE: 30
PIF_VALUE: 22
PIF_VALUE: 31
PIF_VALUE: 32
PIF_VALUE: 30
PIF_VALUE: 30
PIF_VALUE: 1
PIF_VALUE: 30
PIF_VALUE: 29
PIF_VALUE: 28
PIF_VALUE: 30
PIF_VALUE: 30
PIF_VALUE: 15
PIF_VALUE: 15
PIF_VALUE: 19
PIF_VALUE: 30
PIF_VALUE: 30
PIF_VALUE: 31
PIF_VALUE: 30
PIF_VALUE: 30
PIF_VALUE: 0
PIF_VALUE: 30
PIF_VALUE: 17
PIF_VALUE: 31
PIF_VALUE: 31
PIF_VALUE: 17
PIF_VALUE: 30
PIF_VALUE: 31
PIF_VALUE: 15
PIF_VALUE: 15
PIF_VALUE: 30
PIF_VALUE: 31
PIF_VALUE: 30
PIF_VALUE: 1
PIF_VALUE: 16
PIF_VALUE: 31
PIF_VALUE: 30
PIF_VALUE: 30
PIF_VALUE: 31
PIF_VALUE: 15
PIF_VALUE: 17
PIF_VALUE: 30
PIF_VALUE: 17
PIF_VALUE: 30
PIF_VALUE: 16
PIF_VALUE: 31
PIF_VALUE: 15
PIF_VALUE: 30
PIF_VALUE: 29
PIF_VALUE: 17
PIF_VALUE: 0
PIF_VALUE: 22
PIF_VALUE: 30
PIF_VALUE: 31
PIF_VALUE: 30
PIF_VALUE: 17
PIF_VALUE: 30
PIF_VALUE: 30
PIF_VALUE: 32
PIF_VALUE: 30
PIF_VALUE: 0
PIF_VALUE: 30
PIF_VALUE: 30
PIF_VALUE: 15
PIF_VALUE: 30
PIF_VALUE: 13
PIF_VALUE: 30
PIF_VALUE: 3
PIF_VALUE: 30
PIF_VALUE: 17
PIF_VALUE: 32
PIF_VALUE: 30
PIF_VALUE: 24
PIF_VALUE: 29
PIF_VALUE: 13
PIF_VALUE: 15
PIF_VALUE: 31
PIF_VALUE: 31
PIF_VALUE: 30
PIF_VALUE: 17
PIF_VALUE: 30
PIF_VALUE: 17
PIF_VALUE: 30
PIF_VALUE: 17
PIF_VALUE: 0
PIF_VALUE: 31
PIF_VALUE: 30
PIF_VALUE: 32

## 2022-03-16 ASSESSMENT — PAIN SCALES - GENERAL
PAINLEVEL_OUTOF10: 2
PAINLEVEL_OUTOF10: 0
PAINLEVEL_OUTOF10: 0

## 2022-03-16 ASSESSMENT — PAIN - FUNCTIONAL ASSESSMENT: PAIN_FUNCTIONAL_ASSESSMENT: 0-10

## 2022-03-16 NOTE — H&P
Mitali Smalls MD  History and Physical    Patient:  Kristina Kay  MRN: [id number]  YOB: 1960    HISTORY OF PRESENT ILLNESS:     The patient is a 63 y.o. male who presents with pca. Here for procedure. Patient's old records, notes and chart reviewed and summarized above. Mitali Smalls MD independently reviewed the images and verified the radiology reports from:    No results found. Past Medical History:    Past Medical History:   Diagnosis Date    Cancer ([facility] [address]     prostrate    Hyperlipidemia     Hypertension     Type 1 diabetes mellitus with hypoglycemia without coma ([facility] [address]        Past Surgical History:    Past Surgical History:   Procedure Laterality Date    COLONOSCOPY  2/11/13        WISDOM TOOTH EXTRACTION  1980       Medications Prior to Admission:    Prior to Admission medications    Medication Sig Start Date End Date Taking?  Authorizing Provider   Multiple Vitamins-Minerals (THERAPEUTIC MULTIVITAMIN-MINERALS) tablet Take 1 tablet by mouth daily   Yes Historical Provider, MD   insulin aspart (NOVOLOG FLEXPEN) 100 UNIT/ML injection pen INJECT 0 TO 30 UNITS SUBCUTEANOUSLY THREE TIMES A DAY PER SLIDING SCALE. 10/20/21  Yes Deysi Martin MD   insulin glargine (LANTUS SOLOSTAR) 100 UNIT/ML injection pen INJECT 22 UNITS INTO THE SKIN NIGHTLY 10/20/21  Yes Deysi Martin MD   simvastatin (ZOCOR) 40 MG tablet take 1 tablet by mouth every evening 4/20/21  Yes Deysi Martin MD   losartan (COZAAR) 100 MG tablet take 1 tablet by mouth once daily  Patient taking differently: nightly take 1 tablet by mouth once daily 4/20/21  Yes Deysi Martin MD   hydroCHLOROthiazide (HYDRODIURIL) 25 MG tablet Take 1 tablet by mouth every morning 4/20/21  Yes Deysi Martin MD   amLODIPine (NORVASC) 10 MG tablet take 1 tablet by mouth once daily  Patient taking differently: nightly take 1 tablet by mouth once daily 4/20/21  Yes Deysi Martin MD   blood glucose monitor strips Test 1time a day & as needed for symptoms of irregular blood glucose. Dispense sufficient amount for indicated testing frequency plus additional to accommodate PRN testing needs. 4/20/21  Yes Sanaz Mccall MD   Continuous Blood Gluc Sensor (DEXCOM G6 SENSOR) MISC Change Sensor every 7-10 days Diagnosis:E11.9 Diagnosis:E11.9 6/12/19  Yes Sanaz Mccall MD   Continuous Blood Gluc  (DEXCOM G6 ) LUDWIN Test blood sugar 8 times daily and as needed Diagnosis:E11.9 6/12/19  Yes Sanaz Mccall MD   Continuous Blood Gluc Transmit (DEXCOM G6 TRANSMITTER) MISC Change transmitter every 7-10 days Diagnosis:E11.9 6/12/19  Yes Sanaz Mccall MD   aspirin 81 MG EC tablet Take 81 mg by mouth daily      Historical Provider, MD       Allergies:  Patient has no known allergies. Social History:    Social History     Socioeconomic History    Marital status:      Spouse name: Not on file    Number of children: Not on file    Years of education: Not on file    Highest education level: Not on file   Occupational History    Not on file   Tobacco Use    Smoking status: Never Smoker    Smokeless tobacco: Never Used   Vaping Use    Vaping Use: Never used   Substance and Sexual Activity    Alcohol use: Yes     Alcohol/week: 6.0 standard drinks     Types: 2 Glasses of wine, 2 Cans of beer, 2 Shots of liquor per week     Comment: occas    Drug use: Never    Sexual activity: Yes     Partners: Female   Other Topics Concern    Not on file   Social History Narrative    Not on file     Social Determinants of Health     Financial Resource Strain: Low Risk     Difficulty of Paying Living Expenses: Not hard at all   Food Insecurity: No Food Insecurity    Worried About [address] in the Last Year: Never true    [address] [facility] [address] in the Last Year: Never true   Transportation Needs:     Lack of Transportation (Medical): Not on file    Lack of Transportation (Non-Medical):  Not on file   Physical Activity:     Days of Exercise per Week: Not on file    Minutes of Exercise per Session: Not on file   Stress:     Feeling of Stress : Not on file   Social Connections:     Frequency of Communication with Friends and Family: Not on file    Frequency of Social Gatherings with Friends and Family: Not on file    Attends Hinduism Services: Not on file    Active Member of [address] [place] [address] [employer] or Organizations: Not on file    Attends Club or Organization Meetings: Not on file    Marital Status: Not on file   Intimate Partner Violence:     Fear of Current or Ex-Partner: Not on file    Emotionally Abused: Not on file    Physically Abused: Not on file    Sexually Abused: Not on file   Housing Stability:     Unable to Pay for Housing in the Last Year: Not on file    Number of [place] in the Last Year: Not on file    Unstable Housing in the Last Year: Not on file       Family History:    Family History   Problem Relation Age of Onset    Cancer Sister     Diabetes Paternal Grandfather        REVIEW OF SYSTEMS:  Constitutional: negative  Eyes: negative  Respiratory: negative  Cardiovascular: negative  Gastrointestinal: negative  Genitourinary: no acute issues  Musculoskeletal: negative  Skin: negative   Neurological: negative  Hematological/Lymphatic: negative  Psychological: negative    Physical Exam:      No data found. Constitutional: Patient in no acute distress; Neuro: alert and oriented to person place and time. Psych: Mood and affect normal.  Skin: Normal  Lungs: Respiratory effort normal, CTA  Cardiovascular:  Normal peripheral pulses; no murmur. Normal rhythm  Abdomen: Soft, non-tender, non-distended with no CVA, flank pain, hepatosplenomegaly or hernia. Kidneys normal.  Bladder non-tender and not distended. LABS:   No results for input(s): WBC, HGB, HCT, MCV, PLT in the last 72 hours. No results for input(s): NA, K, CL, CO2, PHOS, BUN, CREATININE, CA in the last 72 hours.   Lab Results   Component Value Date    PSA 4.32 (H) 09/25/2021    PSA 3.67 (H) 04/10/2021    PSA 2.77 (H) 02/12/2020         Urinalysis: No results for input(s): COLORU, PHUR, LABCAST, WBCUA, RBCUA, MUCUS, TRICHOMONAS, YEAST, BACTERIA, CLARITYU, SPECGRAV, LEUKOCYTESUR, UROBILINOGEN, BILIRUBINUR, BLOODU in the last 72 hours.     Invalid input(s): NITRATE, GLUCOSEUKETONESUAMORPHOUS     -----------------------------------------------------------------      Assessment and Plan     Impression:    Patient Active Problem List   Diagnosis    Hypertension    Hyperlipidemia    Type 1 diabetes mellitus with hypoglycemia and without coma ([facility] [address]       Plan:     Consent obtained; ralp in OR today    Bridget Jeong MD  9:50 PM 3/15/2022

## 2022-03-16 NOTE — PROGRESS NOTES
1608-pt received to pacu, resp easy NP airway in place  Vss. dexacom reading 331. Pt appears in no acute distress. 1630-pt continues to rest in bed eyes closed, rest easy, unlabored. Vss.     1638-pt meets criteria for discharge from pacu, awaiting IP bed to finish cleaning. 1645-report called to 5K RN awaiting transport. 1709-transport here to take pt to IP room, SDS notified to update spouse with location.

## 2022-03-16 NOTE — BRIEF OP NOTE
Brief Postoperative Note      Patient: Rajeev Crump  YOB: 1960  MRN: [id number]    Date of Procedure: 3/16/2022    Pre-Op Diagnosis: PROSTATE CANCER    Post-Op Diagnosis: Prostate Cancer       Procedure(s):  ROBOTIC ASSISTED LAPAROSCOPIC RADICAL PROSTATECTOMY WITH BILATERAL LYMPH NODE DISSECTION    Surgeon(s):  Anel Pineda MD    Assistant:  Physician Assistant: Kylee Eric PA-C    Anesthesia: General    Estimated Blood Loss (mL): 765 mL    Complications: None    Specimens:   ID Type Source Tests Collected by Time Destination   A : Prostate and seminal vessicles Tissue Prostate SURGICAL PATHOLOGY Anel Pineda MD 3/16/2022 1353    B : bilateral pelvic lymph nodes Tissue Lymph Node SURGICAL PATHOLOGY Anel Pineda MD 3/16/2022 1355        Implants:  Implant Name Type Inv.  Item Serial No.  Lot No. LRB No. Used Action   ALLOGRAFT HUM TISS 6X3 CM CRYOPRESERVED AMNIOX CLARIX CRD 1K - [id number]  ALLOGRAFT HUM TISS 6X3 CM CRYOPRESERVED AMNIOX CLARIX CRD 1K  TISSUE Rapid7 [facility] [id number]  1 Implanted         Drains:   Urethral Catheter Non-latex 20 fr (Active)       Findings: See dictated operative note    Electronically signed by Kylee Eric PA-C on 3/16/2022 at 4:14 PM

## 2022-03-16 NOTE — PROGRESS NOTES
Pt admitted to [facility] room 14 and oriented to unit. SCD sleeves applied. Nares swabbed. Pt verbalized permission for first name, last initial and physicians name on white board. SDS board and discharge criteria explained, pt and family verbalized understanding. Pt denies thoughts of harming self or others. Call light in reach. Family at the bedside.

## 2022-03-16 NOTE — ANESTHESIA POSTPROCEDURE EVALUATION
Department of Anesthesiology  Postprocedure Note    Patient: Josefina Tyson  MRN: [id number]  YOB: 1960  Date of evaluation: 3/16/2022  Time:  4:17 PM     Procedure Summary     Date: 03/16/22 Room / Location: [address] [place] [address]    Anesthesia Start: 5422 Anesthesia Stop: 7111    Procedure: ROBOTIC ASSISTED LAPAROSCOPIC RADICAL PROSTATECTOMY WITH BILATERAL LYMPH NODE DISSECTION (Bilateral ) Diagnosis: (PROSTATE CANCER)    Surgeons: Odessa Kim MD Responsible Provider: Kavay Butler MD    Anesthesia Type: general ASA Status: 3          Anesthesia Type: general    Jaz Phase I: Jaz Score: 10    Jaz Phase II:      Last vitals: Reviewed and per EMR flowsheets. Anesthesia Post Evaluation    Patient location during evaluation: PACU  Patient participation: complete - patient participated  Level of consciousness: responsive to verbal stimuli  Airway patency: patent  Nausea & Vomiting: no vomiting and no nausea  Complications: no  Cardiovascular status: hemodynamically stable  Respiratory status: acceptable and face mask  Hydration status: stable  Comments:  Will recheck b;ood sugar and cover with regular insulin

## 2022-03-16 NOTE — PROGRESS NOTES
Patient requesting to use home medications while here in the hospital. Home medication form filled out and signed by this RN and patient's wife with approval of patient. Paper sent to pharmacy.

## 2022-03-16 NOTE — ANESTHESIA PRE PROCEDURE
Department of Anesthesiology  Preprocedure Note       Name:  Vivek Scanlon   Age:  63 y.o.  :  1960                                          MRN:  [id number]         Date:  3/16/2022      Surgeon: Howard Rowe):  Deedee Shepherd MD    Procedure: Procedure(s):  ROBOTIC ASSISTED LAPAROSCOPIC RADICAL PROSTATECTOMY WITH BILATERAL LYMPH NODE DISSECTION    Medications prior to admission:   Prior to Admission medications    Medication Sig Start Date End Date Taking? Authorizing Provider   Multiple Vitamins-Minerals (THERAPEUTIC MULTIVITAMIN-MINERALS) tablet Take 1 tablet by mouth daily   Yes Historical Provider, MD   insulin aspart (NOVOLOG FLEXPEN) 100 UNIT/ML injection pen INJECT 0 TO 30 UNITS SUBCUTEANOUSLY THREE TIMES A DAY PER SLIDING SCALE. 10/20/21  Yes Sanaz Mccall MD   insulin glargine (LANTUS SOLOSTAR) 100 UNIT/ML injection pen INJECT 22 UNITS INTO THE SKIN NIGHTLY 10/20/21  Yes Sanaz Mccall MD   simvastatin (ZOCOR) 40 MG tablet take 1 tablet by mouth every evening 21  Yes Sanaz Mccall MD   losartan (COZAAR) 100 MG tablet take 1 tablet by mouth once daily  Patient taking differently: nightly take 1 tablet by mouth once daily 21  Yes Sanaz Mccall MD   hydroCHLOROthiazide (HYDRODIURIL) 25 MG tablet Take 1 tablet by mouth every morning 21  Yes Sanaz Mccall MD   amLODIPine (NORVASC) 10 MG tablet take 1 tablet by mouth once daily  Patient taking differently: nightly take 1 tablet by mouth once daily 21  Yes Sanaz Mccall MD   blood glucose monitor strips Test 1time a day & as needed for symptoms of irregular blood glucose. Dispense sufficient amount for indicated testing frequency plus additional to accommodate PRN testing needs.  21  Yes Sanaz Mccall MD   Continuous Blood Gluc Sensor (DEXCOM G6 SENSOR) MISC Change Sensor every 7-10 days Diagnosis:E11.9 Diagnosis:E11.9 19  Yes Sanaz Mccall MD   Continuous Blood Gluc  (DEXCOM G6 ) LUDWIN Test blood sugar 8 times daily and as needed Diagnosis:E11.9 6/12/19  Yes Oneal Covarrubias MD   Continuous Blood Gluc Transmit (DEXCOM G6 TRANSMITTER) MISC Change transmitter every 7-10 days Diagnosis:E11.9 6/12/19  Yes Oneal Covarrubias MD   aspirin 81 MG EC tablet Take 81 mg by mouth daily      Historical Provider, MD       Current medications:    Current Facility-Administered Medications   Medication Dose Route Frequency Provider Last Rate Last Admin    0.9 % sodium chloride infusion   IntraVENous Continuous Richie Dodson  mL/hr at 03/16/22 1115 New Bag at 03/16/22 1115    ceFAZolin (ANCEF) 2000 mg in dextrose 5 % 50 mL IVPB  2,000 mg IntraVENous 30 Min Pre-Op Richie Dodson MD           Allergies:  No Known Allergies    Problem List:    Patient Active Problem List   Diagnosis Code    Hypertension I10    Hyperlipidemia E78.5    Type 1 diabetes mellitus with hypoglycemia and without coma ([facility] [address] E10.649       Past Medical History:        Diagnosis Date    Cancer ([facility] [address]     prostrate    Hyperlipidemia     Hypertension     Type 1 diabetes mellitus with hypoglycemia without coma ([facility] [address]        Past Surgical History:        Procedure Laterality Date    COLONOSCOPY  2/11/13    Dr.Sheikh Cat Vences WISDOM TOOTH EXTRACTION  1980       Social History:    Social History     Tobacco Use    Smoking status: Never Smoker    Smokeless tobacco: Never Used   Substance Use Topics    Alcohol use:  Yes     Alcohol/week: 6.0 standard drinks     Types: 2 Glasses of wine, 2 Cans of beer, 2 Shots of liquor per week     Comment: occas                                Counseling given: Not Answered      Vital Signs (Current):   Vitals:    03/08/22 1338 03/16/22 1032 03/16/22 1057   BP:  125/73    Pulse:  72    Resp:  12    Temp:  96.8 °F (36 °C)    SpO2:  97%    Weight: 200 lb (90.7 kg)  204 lb 12.8 oz (92.9 kg)   Height: 5' 10\" (1.778 m)                                                BP Readings from Last 3 Encounters:   03/16/22 125/73   01/24/22 108/66 01/18/22 (!) 144/80       NPO Status: Time of last liquid consumption: 2300                        Time of last solid consumption: 2300                        Date of last liquid consumption: 03/15/22                        Date of last solid food consumption: 03/15/22    BMI:   Wt Readings from Last 3 Encounters:   03/16/22 204 lb 12.8 oz (92.9 kg)   01/24/22 212 lb (96.2 kg)   01/18/22 216 lb (98 kg)     Body mass index is 29.39 kg/m².     CBC:   Lab Results   Component Value Date    WBC 5.1 01/20/2022    RBC 4.81 01/20/2022    HGB 15.0 01/20/2022    HCT 45.3 01/20/2022    MCV 94.2 01/20/2022    RDW 13.3 06/21/2014     01/20/2022       CMP:   Lab Results   Component Value Date     01/20/2022    K 4.9 01/20/2022     01/20/2022    CO2 26 01/20/2022    BUN 14 01/20/2022    CREATININE 0.8 01/20/2022    LABGLOM >90 01/20/2022    GLUCOSE 105 01/20/2022    GLUCOSE 93 09/30/2014    PROT 7.7 09/25/2021    CALCIUM 10.0 01/20/2022    BILITOT 1.0 09/25/2021    ALKPHOS 77 09/25/2021    AST 23 09/25/2021    ALT 12 09/25/2021       POC Tests:   Recent Labs     03/16/22  1121   POCGLU 288*       Coags: No results found for: PROTIME, INR, APTT    HCG (If Applicable): No results found for: PREGTESTUR, PREGSERUM, HCG, HCGQUANT     ABGs: No results found for: PHART, PO2ART, WWB4FZZ, ADO8YIF, BEART, U4QEHGVD     Type & Screen (If Applicable):  No results found for: LABABO, LABRH    Drug/Infectious Status (If Applicable):  Lab Results   Component Value Date    HEPCAB Negative 04/14/2017       COVID-19 Screening (If Applicable): No results found for: COVID19        Anesthesia Evaluation    Airway: Mallampati: II  TM distance: >3 FB   Neck ROM: full  Mouth opening: > = 3 FB Dental:          Pulmonary: breath sounds clear to auscultation                             Cardiovascular:    (+) hypertension:,         Rhythm: regular                      Neuro/Psych:               GI/Hepatic/Renal:             Endo/Other: (+) Diabetes, . Abdominal:   (+) obese,           Vascular: Other Findings:             Anesthesia Plan      general     ASA 3       Induction: intravenous. MIPS: Postoperative opioids intended and Prophylactic antiemetics administered. Anesthetic plan and risks discussed with patient and spouse. Use of blood products discussed with patient and spouse whom. Plan discussed with CRNA.                   Fabiola Garibay MD   3/16/2022

## 2022-03-16 NOTE — OP NOTE
[address] [place] [address]. [place]    DATE: 3/16/2022  Patient:  Darien Pat  MRN: [id number]  YOB: 1960    SURGEON: Sandra Wyatt MD.    ASSISTANT: Kyle Oliver     PREOPERATIVE DIAGNOSIS: Prostate Cancer    POSTOPERATIVE DIAGNOSIS: Prostate Cancer    PROCEDURE PERFORMED: Robotic Assisted Laparoscopic Prostatectomy with Bilateral Pelvic Lymph node dissection and clarix nerve wrap for neurovascular bundle ,      ANESTHESIA: General    COMPLICATIONS: none    OR BLOOD LOSS:  200mL    FLUIDS: Cystalloids per Anesthesia    SPECIMENS:  ID Type Source Tests Collected by Time Destination   A : Prostate and seminal vessicles Tissue Prostate SURGICAL PATHOLOGY Renettacricket Cristobal MD 3/16/2022 1353    B : bilateral pelvic lymph nodes Tissue Lymph Node SURGICAL PATHOLOGY Renetta Cristobal MD 3/16/2022 1350          DRAINS: 18 [address]    INDICATIONS FOR PROCEDURE:  The patient is a 63 y.o. male who presents today with PROSTATE CANCER here for ROBOTIC ASSISTED LAPAROSCOPIC RADICAL PROSTATECTOMY WITH BILATERAL LYMPH NODE DISSECTION. After risks, benefits and alternatives of the procedure were discussed with the patient, the patient elected to proceed. DETAILS OF PROCEDURE:  After informed consent was obtained in the preoperative area, the patient was taken back to the operating room. He was transferred to the operating table in the supine position. EPC cuffs were placed. The machine was turned on. Anesthesia was induced and antibiotics were started. His arms were tucked. All areas were appropriately padded. He had received anticoagulation preoperatively. He was sterilely prepped and draped in a standard fashion. A sterile catheter was placed on the field. At this time we made a small roughly a 2.4OE supraumbilical incision. Using a Veress needle we obtained pneumoperitoneum. Once the abdomen insufflated, an 8 mm robotic port was placed in the midline using a blunt trocar.  The camera was introduced. The abdomen was inspected. There was no evidence of traumatic Veress needle insertion. We then placed the remainder of our ports. This included 3 further 8 mm robotic ports; a 5 mm assistant port; and a 12 mm assistant port. The instruments were placed under visual guidance after the robot was docked. some sigmoid attachments were lysed\" \" . Once this was completed, we began to drop the bladder. Once the bladder was dropped. The endopelvic fascia was cleared off. The superficial DVC was ligated using bipolar energy. The endopelvic fascia was then incised. The pelvic floor muscles were cleaned off of the sidewall of the prostate. A close standard dissection was completed. The DVC was ligated using a 0 V-Lock suture. Attention was turned to the bladder neck. A median lobe was not present. The prostato-vesicle junction was located. Using monopolar energy the bladder neck dissection was completed. Bladder neck reconstruction was performed. The anterior Denonvilliers fascia was incised. The bilateral seminal vesicles and vasa were seen. These were carefully dissected out and then elevated anteriorly. The posterior Denonvilliers fascia was incised and the rectum was gently swept off the posterior aspect of the prostate. When this was completed,  the pedicles were ligated using Weck hem-o-lock clips. Care was taken to not injure the nerves that had previously been dissected off the lateral surface of the prostate. Attention was then turned to the anterior dissection. Using monopolar energy the DVC was freed. The urethra was transected using cold energy. The prostate was freed. clarix nerve wrap was placed across the neurovascular bundle. This was pexed to the neurovascular bundle with 3-0 vicryl suture. .     The lymph node dissection was then started. The external iliac vein was located. It was freed of its lymphatic tissues. The  nerve was identified bilaterally. This was preserved. The lymph node packet was then removed from the bifurcation of the common iliac to Coopers ligament, and from the bladder to the pelvic sidewall laterally. Attention was turned to the bladder neck. {The anastomosis was then completed by first placing a 3-0 V-lock Monocryl Bethel stitch. The running anastomosis was completed with a 3-0 Monocryl quill  suture. The anastomosis was tested using 120 cc of sterile irrigation. SHANNAN drain was not placed. A new catheter was also placed. The robot was undocked. The specimen was then extracted through the midline incision. The abdominal fascia was reapproximated in the midline using 0 Vicryl sutures in Figure-of-8 manner. All wounds were irrigated. The skin was closed using 4-0 Monocryl suture. Patient was awakened, extubated, and discharged back to the PACU in good and stable condition. Follow-Up: Patient will undergo our routine postoperative prostatectomy clinical pathway. He will have stat labs. He will be given a clear liquid diet. Any deviation from a normal postoperative course will be reflected the discharge summary.

## 2022-03-17 ENCOUNTER — TELEPHONE (OUTPATIENT)
Dept: UROLOGY | Age: 62
End: 2022-03-17

## 2022-03-17 LAB
ANION GAP SERPL CALCULATED.3IONS-SCNC: 13 MEQ/L (ref 8–16)
BASOPHILS # BLD: 0.1 %
BASOPHILS ABSOLUTE: 0 THOU/MM3 (ref 0–0.1)
BUN BLDV-MCNC: 15 MG/DL (ref 7–22)
CALCIUM SERPL-MCNC: 8.6 MG/DL (ref 8.5–10.5)
CHLORIDE BLD-SCNC: 102 MEQ/L (ref 98–111)
CO2: 21 MEQ/L (ref 23–33)
CREAT SERPL-MCNC: 0.8 MG/DL (ref 0.4–1.2)
EKG ATRIAL RATE: 75 BPM
EKG P AXIS: 52 DEGREES
EKG P-R INTERVAL: 156 MS
EKG Q-T INTERVAL: 406 MS
EKG QRS DURATION: 100 MS
EKG QTC CALCULATION (BAZETT): 453 MS
EKG R AXIS: 16 DEGREES
EKG T AXIS: 22 DEGREES
EKG VENTRICULAR RATE: 75 BPM
EOSINOPHIL # BLD: 0 %
EOSINOPHILS ABSOLUTE: 0 THOU/MM3 (ref 0–0.4)
ERYTHROCYTE [DISTWIDTH] IN BLOOD BY AUTOMATED COUNT: 12.7 % (ref 11.5–14.5)
ERYTHROCYTE [DISTWIDTH] IN BLOOD BY AUTOMATED COUNT: 43.4 FL (ref 35–45)
GFR SERPL CREATININE-BSD FRML MDRD: > 90 ML/MIN/1.73M2
GLUCOSE BLD-MCNC: 272 MG/DL (ref 70–108)
GLUCOSE BLD-MCNC: 351 MG/DL (ref 70–108)
GLUCOSE BLD-MCNC: 393 MG/DL (ref 70–108)
GLUCOSE BLD-MCNC: 407 MG/DL (ref 70–108)
GLUCOSE BLD-MCNC: 445 MG/DL (ref 70–108)
GLUCOSE BLD-MCNC: 463 MG/DL (ref 70–108)
GLUCOSE BLD-MCNC: 489 MG/DL (ref 70–108)
GLUCOSE BLD-MCNC: 518 MG/DL (ref 70–108)
HCT VFR BLD CALC: 39 % (ref 42–52)
HEMOGLOBIN: 13.1 GM/DL (ref 14–18)
IMMATURE GRANS (ABS): 0.03 THOU/MM3 (ref 0–0.07)
IMMATURE GRANULOCYTES: 0.3 %
LYMPHOCYTES # BLD: 4.1 %
LYMPHOCYTES ABSOLUTE: 0.4 THOU/MM3 (ref 1–4.8)
MCH RBC QN AUTO: 31 PG (ref 26–33)
MCHC RBC AUTO-ENTMCNC: 33.6 GM/DL (ref 32.2–35.5)
MCV RBC AUTO: 92.4 FL (ref 80–94)
MONOCYTES # BLD: 10.3 %
MONOCYTES ABSOLUTE: 1.1 THOU/MM3 (ref 0.4–1.3)
NUCLEATED RED BLOOD CELLS: 0 /100 WBC
PLATELET # BLD: 220 THOU/MM3 (ref 130–400)
PMV BLD AUTO: 9.2 FL (ref 9.4–12.4)
POTASSIUM SERPL-SCNC: 4.4 MEQ/L (ref 3.5–5.2)
RBC # BLD: 4.22 MILL/MM3 (ref 4.7–6.1)
SEG NEUTROPHILS: 85.2 %
SEGMENTED NEUTROPHILS ABSOLUTE COUNT: 9.3 THOU/MM3 (ref 1.8–7.7)
SODIUM BLD-SCNC: 136 MEQ/L (ref 135–145)
TROPONIN T: < 0.01 NG/ML
WBC # BLD: 10.9 THOU/MM3 (ref 4.8–10.8)

## 2022-03-17 PROCEDURE — 93005 ELECTROCARDIOGRAM TRACING: CPT | Performed by: NURSE PRACTITIONER

## 2022-03-17 PROCEDURE — 6370000000 HC RX 637 (ALT 250 FOR IP): Performed by: NURSE PRACTITIONER

## 2022-03-17 PROCEDURE — APPNB45 APP NON BILLABLE 31-45 MINUTES: Performed by: NURSE PRACTITIONER

## 2022-03-17 PROCEDURE — 80048 BASIC METABOLIC PNL TOTAL CA: CPT

## 2022-03-17 PROCEDURE — 82948 REAGENT STRIP/BLOOD GLUCOSE: CPT

## 2022-03-17 PROCEDURE — 99223 1ST HOSP IP/OBS HIGH 75: CPT

## 2022-03-17 PROCEDURE — 93010 ELECTROCARDIOGRAM REPORT: CPT | Performed by: INTERNAL MEDICINE

## 2022-03-17 PROCEDURE — 84484 ASSAY OF TROPONIN QUANT: CPT

## 2022-03-17 PROCEDURE — 99024 POSTOP FOLLOW-UP VISIT: CPT | Performed by: NURSE PRACTITIONER

## 2022-03-17 PROCEDURE — 36415 COLL VENOUS BLD VENIPUNCTURE: CPT

## 2022-03-17 PROCEDURE — 6370000000 HC RX 637 (ALT 250 FOR IP): Performed by: PHYSICIAN ASSISTANT

## 2022-03-17 PROCEDURE — 2580000003 HC RX 258: Performed by: PHYSICIAN ASSISTANT

## 2022-03-17 PROCEDURE — 6360000002 HC RX W HCPCS: Performed by: PHYSICIAN ASSISTANT

## 2022-03-17 PROCEDURE — 6370000000 HC RX 637 (ALT 250 FOR IP)

## 2022-03-17 PROCEDURE — 85025 COMPLETE CBC W/AUTO DIFF WBC: CPT

## 2022-03-17 RX ORDER — INSULIN GLARGINE 100 [IU]/ML
30 INJECTION, SOLUTION SUBCUTANEOUS NIGHTLY
Status: DISCONTINUED | OUTPATIENT
Start: 2022-03-17 | End: 2022-03-18 | Stop reason: HOSPADM

## 2022-03-17 RX ORDER — FAMOTIDINE 20 MG/1
20 TABLET, FILM COATED ORAL 2 TIMES DAILY
Status: DISCONTINUED | OUTPATIENT
Start: 2022-03-17 | End: 2022-03-18 | Stop reason: HOSPADM

## 2022-03-17 RX ORDER — DIAZEPAM 2 MG/1
2 TABLET ORAL ONCE
Status: COMPLETED | OUTPATIENT
Start: 2022-03-17 | End: 2022-03-17

## 2022-03-17 RX ORDER — DEXTROSE MONOHYDRATE 25 G/50ML
12.5 INJECTION, SOLUTION INTRAVENOUS PRN
Status: DISCONTINUED | OUTPATIENT
Start: 2022-03-17 | End: 2022-03-17 | Stop reason: CLARIF

## 2022-03-17 RX ORDER — DEXTROSE MONOHYDRATE 50 MG/ML
100 INJECTION, SOLUTION INTRAVENOUS PRN
Status: DISCONTINUED | OUTPATIENT
Start: 2022-03-17 | End: 2022-03-18 | Stop reason: HOSPADM

## 2022-03-17 RX ORDER — NICOTINE POLACRILEX 4 MG
15 LOZENGE BUCCAL PRN
Status: DISCONTINUED | OUTPATIENT
Start: 2022-03-17 | End: 2022-03-17 | Stop reason: CLARIF

## 2022-03-17 RX ADMIN — BACITRACIN ZINC NEOMYCIN SULFATE POLYMYXIN B SULFATE: 400; 3.5; 5 OINTMENT TOPICAL at 07:53

## 2022-03-17 RX ADMIN — Medication 40 MG: at 22:36

## 2022-03-17 RX ADMIN — BACITRACIN ZINC NEOMYCIN SULFATE POLYMYXIN B SULFATE: 400; 3.5; 5 OINTMENT TOPICAL at 22:34

## 2022-03-17 RX ADMIN — AMLODIPINE BESYLATE 10 MG: 10 TABLET ORAL at 22:25

## 2022-03-17 RX ADMIN — FAMOTIDINE 20 MG: 20 TABLET ORAL at 22:24

## 2022-03-17 RX ADMIN — FAMOTIDINE 20 MG: 20 TABLET ORAL at 11:18

## 2022-03-17 RX ADMIN — HYDROCHLOROTHIAZIDE 25 MG: 25 TABLET ORAL at 07:56

## 2022-03-17 RX ADMIN — SODIUM CHLORIDE: 9 INJECTION, SOLUTION INTRAVENOUS at 02:00

## 2022-03-17 RX ADMIN — CEFAZOLIN 2000 MG: 10 INJECTION, POWDER, FOR SOLUTION INTRAVENOUS at 03:44

## 2022-03-17 RX ADMIN — INSULIN ASPART 40 UNITS: 100 INJECTION, SOLUTION INTRAVENOUS; SUBCUTANEOUS at 14:55

## 2022-03-17 RX ADMIN — SENNOSIDES AND DOCUSATE SODIUM 1 TABLET: 50; 8.6 TABLET ORAL at 22:24

## 2022-03-17 RX ADMIN — CEFAZOLIN 2000 MG: 10 INJECTION, POWDER, FOR SOLUTION INTRAVENOUS at 11:35

## 2022-03-17 RX ADMIN — MAGNESIUM HYDROXIDE 30 ML: 2400 SUSPENSION ORAL at 10:10

## 2022-03-17 RX ADMIN — ACETAMINOPHEN 650 MG: 325 TABLET ORAL at 14:16

## 2022-03-17 RX ADMIN — DIAZEPAM 2 MG: 2 TABLET ORAL at 11:19

## 2022-03-17 RX ADMIN — MORPHINE SULFATE 4 MG: 4 INJECTION, SOLUTION INTRAMUSCULAR; INTRAVENOUS at 09:57

## 2022-03-17 RX ADMIN — ACETAMINOPHEN 650 MG: 325 TABLET ORAL at 07:56

## 2022-03-17 RX ADMIN — LOSARTAN POTASSIUM 100 MG: 100 TABLET ORAL at 22:34

## 2022-03-17 RX ADMIN — SODIUM CHLORIDE, PRESERVATIVE FREE 10 ML: 5 INJECTION INTRAVENOUS at 22:23

## 2022-03-17 RX ADMIN — ACETAMINOPHEN 650 MG: 325 TABLET ORAL at 22:24

## 2022-03-17 RX ADMIN — ACETAMINOPHEN 650 MG: 325 TABLET ORAL at 02:02

## 2022-03-17 RX ADMIN — SENNOSIDES AND DOCUSATE SODIUM 1 TABLET: 50; 8.6 TABLET ORAL at 07:56

## 2022-03-17 RX ADMIN — INSULIN GLARGINE 30 UNITS: 100 INJECTION, SOLUTION SUBCUTANEOUS at 22:26

## 2022-03-17 ASSESSMENT — PAIN SCALES - GENERAL
PAINLEVEL_OUTOF10: 3
PAINLEVEL_OUTOF10: 1
PAINLEVEL_OUTOF10: 7
PAINLEVEL_OUTOF10: 3
PAINLEVEL_OUTOF10: 7
PAINLEVEL_OUTOF10: 3
PAINLEVEL_OUTOF10: 3

## 2022-03-17 ASSESSMENT — PAIN DESCRIPTION - ONSET
ONSET: ON-GOING
ONSET: ON-GOING

## 2022-03-17 ASSESSMENT — PAIN DESCRIPTION - LOCATION
LOCATION: ABDOMEN
LOCATION: ABDOMEN
LOCATION: SHOULDER;CHEST
LOCATION: ABDOMEN;SHOULDER
LOCATION: SHOULDER

## 2022-03-17 ASSESSMENT — PAIN DESCRIPTION - PAIN TYPE
TYPE: SURGICAL PAIN
TYPE: ACUTE PAIN
TYPE: SURGICAL PAIN
TYPE: ACUTE PAIN
TYPE: SURGICAL PAIN

## 2022-03-17 ASSESSMENT — PAIN DESCRIPTION - DESCRIPTORS: DESCRIPTORS: ACHING;DISCOMFORT

## 2022-03-17 ASSESSMENT — PAIN DESCRIPTION - PROGRESSION
CLINICAL_PROGRESSION: NOT CHANGED

## 2022-03-17 ASSESSMENT — PAIN DESCRIPTION - FREQUENCY
FREQUENCY: CONTINUOUS
FREQUENCY: CONTINUOUS

## 2022-03-17 ASSESSMENT — PAIN DESCRIPTION - ORIENTATION
ORIENTATION: RIGHT;LEFT;MID
ORIENTATION: RIGHT

## 2022-03-17 ASSESSMENT — PAIN - FUNCTIONAL ASSESSMENT
PAIN_FUNCTIONAL_ASSESSMENT: ACTIVITIES ARE NOT PREVENTED
PAIN_FUNCTIONAL_ASSESSMENT: ACTIVITIES ARE NOT PREVENTED

## 2022-03-17 NOTE — PLAN OF CARE
Problem: Falls - Risk of:  Goal: Will remain free from falls  Description: Will remain free from falls  Outcome: Ongoing   Patient free from falls this shift. Patient uses call light appropriately, bed in lowest position, nonskid socks on, bed rails up x2, fall sign posted and call light in reach. Patient at risk due to surgery. Problem: Falls - Risk of:  Goal: Absence of physical injury  Description: Absence of physical injury  Outcome: Ongoing  No new physical injuries this shift. Problem: Infection:  Goal: Will remain free from infection  Description: Will remain free from infection  Outcome: Ongoing  No new signs of infection this shift. Problem: Safety:  Goal: Free from accidental physical injury  Description: Free from accidental physical injury  Outcome: Ongoing     Problem: Discharge Planning:  Goal: Patients continuum of care needs are met  Description: Patients continuum of care needs are met  Outcome: Ongoing   Patient plans to discharge home with his wife. Problem: Pain:  Goal: Pain level will decrease  Description: Pain level will decrease  Outcome: Ongoing  Patient reporting pain 3/10 with a goal of no pain. Pain managed by Tylenol per order. Patient satisfied with current interventions including rest and repositioning. Pain assessment ongoing. Problem: Skin Integrity:  Goal: Will show no infection signs and symptoms  Description: Will show no infection signs and symptoms  Outcome: Ongoing     Problem: Skin Integrity:  Goal: Absence of new skin breakdown  Description: Absence of new skin breakdown  Outcome: Ongoing   No new skin breakdown. Pt turns and repositions self. Will continue on going skin assessment. Care plan reviewed with patient. Patient verbalized understanding of the plan of care and contribute to goal setting.

## 2022-03-17 NOTE — CONSULTS
Hospitalist Consult Note        Patient:  Slick Lance  YOB: 1960  Date of Service: 3/17/2022  MRN: [id number]   Acct:  [de-identified]   Primary Care Physician: Joanna Mitchell MD    Chief Complaint:  Prostate Cancer  Reason for consult  Hyperglycemia, type 1 diabetes, chest pain. Date of Service: Pt seen/examined in consultation on 3/17/2022     History Of Present Illness:      Emili segura.o. male who we are asked to see/evaluate by Paty Zavala MD for medical management of hyperglycemia with Hx of Type 1 diabetes, and chest pain. The patient states he developed sudden chest pain that was located on the right side of the chest. The pain started while he was on a walk in the hallway. The pain was non-radiating. Patient denies associated shortness of breath, change in vision, lightheadedness, or dizziness, nausea, vomiting with chest pain. Patient also has a history of T1DM and is insulin dependent. He states that he takes Lantus 22 units nightly, with a Novolog sliding scale 0-30 units three times a day for which he self doses his insulin based off the glucose reading, his activity for the day, and food he eats. The patient's glucose was noted to be elevated, and hospitalist service was consulted for further management. Patient denies headache, lightheadedness, dizziness, change in bowel or urinary habits, abdominal pain, nausea or vomiting associated with the elevated blood glucose. Assessment and Plan:-  1. T1DM:    Glucose 272 and Anion gap noted to be 13.0 this AM. Glucose noted to be 518 at 11:30AM. Home insulin regimen Lantus 22 units, Novolog 0-30 units sliding scale, self-dosed, TID. Pt stared on Lantus and High Dose SSI upon admission with regular diet. Pt given Lantus 11 units last night. Give Novolog 40 units once today, increased Lantus to 30 units tonight. Home-dose sliding scale resumed. Continue POC glucose checks.  Repeat BMP in the AM.     2. Chest pain:    Hx of HTN, HLD. Pt endorses right sided chest pain that began suddenly with walking. STAT EKG and troponin negative. Pt given Morphine with complete resolution of pain. Low suspicion for ACS at this time. Stat EKG and repeat troponin for any change or development of chest pain. 3. S/P prostatectomy with bilateral pelvic lymph node dissection:    POD #1. Managed by primary. Past Medical History:        Diagnosis Date    Cancer ([facility] [address]     prostrate    Hyperlipidemia     Hypertension     Type 1 diabetes mellitus with hypoglycemia without coma [facility])        Past Surgical History:        Procedure Laterality Date    COLONOSCOPY  2/11/13        PROSTATECTOMY Bilateral 3/16/2022    ROBOTIC ASSISTED LAPAROSCOPIC RADICAL PROSTATECTOMY WITH BILATERAL LYMPH NODE DISSECTION performed by Paty Zavala MD at [address] [place] [address] Medications:   No current facility-administered medications on file prior to encounter.      Current Outpatient Medications on File Prior to Encounter   Medication Sig Dispense Refill    Multiple Vitamins-Minerals (THERAPEUTIC MULTIVITAMIN-MINERALS) tablet Take 1 tablet by mouth daily      insulin aspart (NOVOLOG FLEXPEN) 100 UNIT/ML injection pen INJECT 0 TO 30 UNITS SUBCUTEANOUSLY THREE TIMES A DAY PER SLIDING SCALE. (Patient taking differently: 140-199 5 units  not eating, 15 units- does eat   200-249 8 unit  not eating, 18 units -does eat  250-299 14 not eating, 24 units- does eat   300-349- 20  not eating, 34 units- does eat   350-400 - 28 not eating, 38 units -does eat  over 400 - upto 45 units) 30 mL 5    insulin glargine (LANTUS SOLOSTAR) 100 UNIT/ML injection pen INJECT 22 UNITS INTO THE SKIN NIGHTLY 45 mL 1    simvastatin (ZOCOR) 40 MG tablet take 1 tablet by mouth every evening 90 tablet 3    losartan (COZAAR) 100 MG tablet take 1 tablet by mouth once daily (Patient taking differently: nightly take 1 tablet by mouth once daily) 90 tablet 3    hydroCHLOROthiazide (HYDRODIURIL) 25 MG tablet Take 1 tablet by mouth every morning 90 tablet 3    amLODIPine (NORVASC) 10 MG tablet take 1 tablet by mouth once daily (Patient taking differently: nightly take 1 tablet by mouth once daily) 90 tablet 3    blood glucose monitor strips Test 1time a day & as needed for symptoms of irregular blood glucose. Dispense sufficient amount for indicated testing frequency plus additional to accommodate PRN testing needs. 100 strip 3    Continuous Blood Gluc Sensor (DEXCOM G6 SENSOR) MISC Change Sensor every 7-10 days Diagnosis:E11.9 Diagnosis:E11.9 12 each 3    Continuous Blood Gluc  (DEXCOM G6 ) LUDWIN Test blood sugar 8 times daily and as needed Diagnosis:E11.9 1 Device 0    Continuous Blood Gluc Transmit (DEXCOM G6 TRANSMITTER) MISC Change transmitter every 7-10 days Diagnosis:E11.9 12 each 3    aspirin 81 MG EC tablet Take 81 mg by mouth daily           Allergies:    Patient has no known allergies. Social History:    reports that he has never smoked. He has never used smokeless tobacco. He reports current alcohol use of about 6.0 standard drinks of alcohol per week. He reports that he does not use drugs. Family History:       Problem Relation Age of Onset   Zambrano Cancer Sister     Diabetes Paternal Grandfather        Diet:  ADULT DIET; Regular; 3 carb choices (45 gm/meal)    Review of systems:   Pertinent positives as noted in the HPI. All other systems reviewed and negative. PHYSICAL EXAM:  /77   Pulse 91   Temp 98.8 °F (37.1 °C) (Oral)   Resp 16   Ht 5' 10\" (1.778 m)   Wt 204 lb 12.8 oz (92.9 kg)   SpO2 98%   BMI 29.39 kg/m²   General appearance: No apparent distress, appears stated age and cooperative. HEENT: Normal cephalic, atraumatic without obvious deformity. Pupils equal, round, and reactive to light. Extra ocular muscles intact. Conjunctivae/corneas clear. Neck: Supple, with full range of motion. No jugular venous distention. Trachea midline. Respiratory:  Normal respiratory effort. Clear to auscultation, bilaterally without Rales/Wheezes/Rhonchi. Cardiovascular: Regular rate and rhythm with normal S1/S2 without murmurs, rubs or gallops. Abdomen: Soft, non-tender, non-distended with normal bowel sounds. Musculoskeletal:  No clubbing, cyanosis or edema bilaterally. Skin: Skin color, texture, turgor normal.  No rashes or lesions. Neurologic:  Neurovascularly intact without any focal sensory/motor deficits. Cranial nerves: II-XII intact, grossly non-focal.  Psychiatric: Alert and oriented, thought content appropriate, normal insight  Capillary Refill: Brisk,< 3 seconds   Peripheral Pulses: +2 palpable, equal bilaterally     Labs:   Recent Labs     03/16/22 1955 03/17/22  0543   WBC 11.1* 10.9*   HGB 14.2 13.1*   HCT 43.3 39.0*    220     Recent Labs     03/16/22 1955 03/17/22  0543    136   K 4.2 4.4    102   CO2 19* 21*   BUN 21 15   CREATININE 1.0 0.8   CALCIUM 8.7 8.6     No results for input(s): AST, ALT, BILIDIR, BILITOT, ALKPHOS in the last 72 hours. No results for input(s): INR in the last 72 hours. No results for input(s): [place] in the last 72 hours. Urinalysis:    No results found for: Melinda Beau, BACTERIA, RBCUA, BLOODU, [address], [place] [address]    Radiology:   No orders to display     No results found.       EKG: NSR      THANK YOU FOR THE CONSULT    Electronically signed by Tony Mendez PA-C on 3/17/2022 at 4:34 PM

## 2022-03-17 NOTE — PROGRESS NOTES
Patient ambulated 640 ft. Educated on incentive spirometer. Patient verbalized understanding and demonstrated proper use of incentive spirometer. Patient and wife also educated on de jesus care, and CHG soap. Understanding verbalized.    Electronically signed by Augustus Paris RN on 3/16/2022 9:00PM

## 2022-03-17 NOTE — PLAN OF CARE
Problem: Falls - Risk of:  Goal: Will remain free from falls  Description: Will remain free from falls  3/17/2022 1031 by Sheri Chang RN  Outcome: Ongoing  Note: Patient free from falls this shift. Patient uses call light appropriately, bed in lowest position, nonskid socks on, bed rails up x2, fall sign posted and call light in reach. Patient at risk due to generalized weakness. Problem: Infection:  Goal: Will remain free from infection  Description: Will remain free from infection  3/17/2022 1031 by Sheri Chang RN  Outcome: Ongoing  Note: IV antibiotics. CHG bath this a.m. No redness or edema noted. Incisions are clean dry and intact. Problem: Pain:  Goal: Pain level will decrease  Description: Pain level will decrease  3/17/2022 1031 by Sheri Chang RN  Outcome: Ongoing  Note: Patient reporting pain 7/10 with a goal of 0/10. Patient satisfied with current interventions including rest and repositioning. Pain assessment ongoing. Care plan reviewed with patient and wife. Patient and wife verbalize understanding of the plan of care and contribute to goal setting.

## 2022-03-17 NOTE — PROGRESS NOTES
Urology Progress Note    Chief Complaint: Prostate cancer    S/p Robotic Assisted Laparoscopic Prostatectomy with Bilateral Pelvic Lymph node dissection and clarix nerve wrap for neurovascular bundle by Dr. Tala Casanova 3/16/22. Subjective:     Pt up in bedside chair. Spouse at bedside. Reports pain of 4/10 in bilateral shoulders but more in R shoulder. Denies chest or epigastric pain. Pain 2-3/10 in abdominal incision sites--increases with coughing/movement. No chest pain, shortness of breath, leg pain, leg swelling. Tafoya draining clear yellow urine. Ambulated in hallway x 2 without difficulty. Passed flatus. No bm. No n/v. Afebrile. Tolerating diet. Vitals:  /84   Pulse 84   Temp 98.4 °F (36.9 °C) (Oral)   Resp 16   Ht 5' 10\" (1.778 m)   Wt 204 lb 12.8 oz (92.9 kg)   SpO2 98%   BMI 29.39 kg/m²   Temp  Av.3 °F (35.7 °C)  Min: 95 °F (35 °C)  Max: 98.4 °F (36.9 °C)    Intake/Output Summary (Last 24 hours) at 3/17/2022 1476  Last data filed at 3/17/2022 0859  Gross per 24 hour   Intake 1460 ml   Output 3400 ml   Net -1940 ml       Social History     Socioeconomic History    Marital status:      Spouse name: Not on file    Number of children: Not on file    Years of education: Not on file    Highest education level: Not on file   Occupational History    Not on file   Tobacco Use    Smoking status: Never Smoker    Smokeless tobacco: Never Used   Vaping Use    Vaping Use: Never used   Substance and Sexual Activity    Alcohol use:  Yes     Alcohol/week: 6.0 standard drinks     Types: 2 Glasses of wine, 2 Cans of beer, 2 Shots of liquor per week     Comment: occas    Drug use: Never    Sexual activity: Yes     Partners: Female   Other Topics Concern    Not on file   Social History Narrative    Not on file     Social Determinants of Health     Financial Resource Strain: Low Risk     Difficulty of Paying Living Expenses: Not hard at all   Food Insecurity: No Food Insecurity    Worried About Running Out of Food in the Last Year: Never true    Isabel of Food in the Last Year: Never true   Transportation Needs:     Lack of Transportation (Medical): Not on file    Lack of Transportation (Non-Medical): Not on file   Physical Activity:     Days of Exercise per Week: Not on file    Minutes of Exercise per Session: Not on file   Stress:     Feeling of Stress : Not on file   Social Connections:     Frequency of Communication with Friends and Family: Not on file    Frequency of Social Gatherings with Friends and Family: Not on file    Attends Catholic Services: Not on file    Active Member of [address] [place] [address] [employer] or Organizations: Not on file    Attends Club or Organization Meetings: Not on file    Marital Status: Not on file   Intimate Partner Violence:     Fear of Current or Ex-Partner: Not on file    Emotionally Abused: Not on file    Physically Abused: Not on file    Sexually Abused: Not on file   Housing Stability:     Unable to Pay for Housing in the Last Year: Not on file    Number of [place] in the Last Year: Not on file    Unstable Housing in the Last Year: Not on file     Family History   Problem Relation Age of Onset    Cancer Sister     Diabetes Paternal Grandfather      No Known Allergies      Constitutional: Alert and oriented times x3, no acute distress, and cooperative to examination with appropriate mood and affect. Up in bedside chair. HEENT:   Head:         Normocephalic and atraumatic. Mucous membranes are normal.   Eyes:         EOM are normal. No scleral icterus. Nose:    The external appearance of the nose is normal  Ears: The ears appear normal to external inspection. Cardiovascular:       Normal rate, regular rhythm. Pulmonary/Chest:  Normal respiratory rate and rhthym. No use of accessory muscles. Lungs clear bilaterally. Abdominal:          Soft. Expected incisional tenderness. Active bowel sounds.   Abdominal incisions with edges well approximated without active drainage or erythema. Staples intact. Genitalia:    Tafoya catheter draining light pink/yellow clear urine  Musculoskeletal:    Normal range of motion. Exhibits no edema or tenderness of lower extremities. Extremities:    No cyanosis, clubbing, or edema present. Neurological:    Alert and oriented. Labs:  WBC:    Lab Results   Component Value Date    WBC 10.9 03/17/2022     Hemoglobin/Hematocrit:    Lab Results   Component Value Date    HGB 13.1 03/17/2022    HCT 39.0 03/17/2022     BMP:    Lab Results   Component Value Date     03/17/2022    K 4.4 03/17/2022     03/17/2022    CO2 21 03/17/2022    BUN 15 03/17/2022    LABALBU 5.3 09/25/2021    LABALBU 4.4 04/06/2012    CREATININE 0.8 03/17/2022    CALCIUM 8.6 03/17/2022    LABGLOM >90 03/17/2022       Impression/Plan:  1. Prostate ca s/p RALRP 3/16/22 by Dr Vito sweeney  2. DM type I--Lantus and sliding scale resumed. 3. Essential HTN, controlled  4. HLP    Pt doing well. Continue to encourage activity. Having shoulder pain/discomfort. Ice/heating pad prn. Continue to mobilize joint. Norco prn. Possible d/c home later today.      Makenna Woo, APRN - [facility]  03/17/22 9:23 AM  Urology

## 2022-03-17 NOTE — PROGRESS NOTES
9954- Patient stating continual unchanged pain in right shoulder rating 7/10 with no relief from heating pad. PRN morphine given. 1100- Patient stating feeling very anxious with some chest discomfort along with no relief of bilateral shoulder pain. VSS see flowsheets. Blood sugar 457 per self monitor. 1102- Perfect serve message sent to Slava Billy NP regarding the above, See orders. 1130- Blood sugar 518, novolog coverage given. [address] made to hospitalist, [address] at bedside to Evaluate patient. Patient stating relief. Will continue to monitor.

## 2022-03-17 NOTE — PROGRESS NOTES
1600- Spoke with hospitalist, Kae,  regarding patient's high blood sugars, it is her overall recommendation to continue to monitor patient over night. 1700-Spoke with urology to update, Guadlupe Shone whom spoke with Dr. Lety Foster that also believes it is in patients best interest to stay tonight and monitor blood sugar. Informed patient and wife and educated that hyperglycemia although it can be common after surgery can prevent healing and increase risk of infection. Described why it is important to ensure that blood sugar levels are under control before discharge to prevent readmission and maintain safety. Patient and wife verbalized understanding. Educated patient on catheter care, and use of chg soap. Educated patient on how to switch regular catheter bag to leg bag and vice versa. Patient and wife verbalized understanding.

## 2022-03-17 NOTE — FLOWSHEET NOTE
Pt was with his wife at the time of the visit. His  called to have him seen. He was dealing with prostate cancer. He was not giving up but wanted prayer to cope and heal. He was anointed. 03/17/22 1517   Encounter Summary   Services provided to: Patient and family together   Referral/Consult From: Other    Support System Spouse   Place of [employer]   Continue Visiting Yes  (3/17)   Complexity of Encounter Moderate   Length of Encounter 15 minutes   Spiritual/Nondenominational   Type Spiritual support   Assessment Approachable;Calm   Intervention Prayer;Nurtured hope; Active listening; Anointing;Empowerment;Sustaining presence/ Ministry of presence   Outcome Connection/belonging;Expressed gratitude;Encouraged; Hopeful;Receptive   Sacraments   Sacrament of Sick-Anointing Anointed

## 2022-03-17 NOTE — PROGRESS NOTES
[phone]- Patient requesting to be given more insulin with lunch due to his high blood sugar, at home patient states he would have given himself more than the ordered 18 units. Perfect serve message sent to Kae ALMAGUER regarding. She is reviewing chart. 1340- Rechecked patient blood sugar at this time it is 489. Perfect serve message sent to hospitalist.   4542- One time dose of 40 units of novolog ordered. Sliding scale with meals increased to match what patient takes at home. Will continue to monitor patient's blood sugars closely using patient's  self device and hospital glucometer.

## 2022-03-17 NOTE — TELEPHONE ENCOUNTER
Pt will need scheduled for cystogram with staple removal and possible de jesus removal pending cystogram results in one week. Tele visit with Dr. Josh Triplett in 2 weeks to review pathology.

## 2022-03-17 NOTE — CARE COORDINATION
3/17/22, 3:39 PM EDT  DISCHARGE PLANNING EVALUATION:    [address]       Admitted: 3/16/2022/ [address] day: 0   Location: [facility]31/075- Reason for admit: Prostate cancer ([facility] [address] Iggymelvin Perales   PMH:  has a past medical history of Cancer ([facility] [address] Hyperlipidemia, Hypertension, and Type 1 diabetes mellitus with hypoglycemia without coma ([facility] [address] Procedure:   3/16/2022   S/p Robotic Assisted Laparoscopic Prostatectomy with Bilateral Pelvic Lymph node dissection and clarix nerve wrap for neurovascular bundle   Barriers to Discharge:  IV fluids, DM management, Tylenol and Senokot scheduled, prn pain medications and Zofran, carb choice diet, ambulate, de jesus leg bag teaching, daily weights, incentive spirometry, up with assistance. PCP: Marcela Watson MD   %    Patient Goals/Plan/Treatment Preferences: Fox Garza has insurance and a PCP. He is able to afford his medications and does not have a need for DME. He will have transportation to home at discharge and denies a need for services. Transportation/Food Security/Housekeeping Addressed:  No issues identified.

## 2022-03-17 NOTE — PROGRESS NOTES
Patient ambulated in hallway 640 feet. Tolerated well. Patient demonstrated use of incentive spirometer and educated to use 10x/hr. Verbalized understanding.

## 2022-03-18 VITALS
HEART RATE: 88 BPM | TEMPERATURE: 98.3 F | DIASTOLIC BLOOD PRESSURE: 93 MMHG | OXYGEN SATURATION: 96 % | BODY MASS INDEX: 29.32 KG/M2 | RESPIRATION RATE: 20 BRPM | WEIGHT: 204.8 LBS | HEIGHT: 70 IN | SYSTOLIC BLOOD PRESSURE: 137 MMHG

## 2022-03-18 DIAGNOSIS — Z01.818 PRE-OP TESTING: Primary | ICD-10-CM

## 2022-03-18 DIAGNOSIS — C61 PROSTATE CANCER (HCC): ICD-10-CM

## 2022-03-18 LAB
AVERAGE GLUCOSE: 138 MG/DL (ref 70–126)
HBA1C MFR BLD: 6.6 % (ref 4.4–6.4)

## 2022-03-18 PROCEDURE — 83036 HEMOGLOBIN GLYCOSYLATED A1C: CPT

## 2022-03-18 PROCEDURE — 6370000000 HC RX 637 (ALT 250 FOR IP): Performed by: NURSE PRACTITIONER

## 2022-03-18 PROCEDURE — 36415 COLL VENOUS BLD VENIPUNCTURE: CPT

## 2022-03-18 PROCEDURE — APPNB30 APP NON BILLABLE TIME 0-30 MINS: Performed by: UROLOGY

## 2022-03-18 PROCEDURE — 6370000000 HC RX 637 (ALT 250 FOR IP): Performed by: PHYSICIAN ASSISTANT

## 2022-03-18 RX ORDER — HYDROCODONE BITARTRATE AND ACETAMINOPHEN 5; 325 MG/1; MG/1
1 TABLET ORAL EVERY 8 HOURS PRN
Qty: 15 TABLET | Refills: 0 | Status: SHIPPED | OUTPATIENT
Start: 2022-03-18 | End: 2022-03-23

## 2022-03-18 RX ORDER — CIPROFLOXACIN 500 MG/1
500 TABLET, FILM COATED ORAL 2 TIMES DAILY
Qty: 6 TABLET | Refills: 0 | Status: SHIPPED | OUTPATIENT
Start: 2022-03-18 | End: 2022-03-21

## 2022-03-18 RX ORDER — SENNA AND DOCUSATE SODIUM 50; 8.6 MG/1; MG/1
1 TABLET, FILM COATED ORAL 2 TIMES DAILY
Qty: 30 TABLET | Refills: 0 | Status: SHIPPED | OUTPATIENT
Start: 2022-03-18 | End: 2022-06-21

## 2022-03-18 RX ADMIN — BACITRACIN ZINC NEOMYCIN SULFATE POLYMYXIN B SULFATE: 400; 3.5; 5 OINTMENT TOPICAL at 08:30

## 2022-03-18 RX ADMIN — FAMOTIDINE 20 MG: 20 TABLET ORAL at 08:29

## 2022-03-18 RX ADMIN — HYDROCHLOROTHIAZIDE 25 MG: 25 TABLET ORAL at 08:30

## 2022-03-18 RX ADMIN — SENNOSIDES AND DOCUSATE SODIUM 1 TABLET: 50; 8.6 TABLET ORAL at 08:28

## 2022-03-18 RX ADMIN — ACETAMINOPHEN 650 MG: 325 TABLET ORAL at 08:28

## 2022-03-18 RX ADMIN — ACETAMINOPHEN 650 MG: 325 TABLET ORAL at 03:51

## 2022-03-18 ASSESSMENT — PAIN DESCRIPTION - PAIN TYPE: TYPE: SURGICAL PAIN

## 2022-03-18 ASSESSMENT — PAIN SCALES - GENERAL
PAINLEVEL_OUTOF10: 4
PAINLEVEL_OUTOF10: 3

## 2022-03-18 ASSESSMENT — PAIN DESCRIPTION - LOCATION: LOCATION: ABDOMEN

## 2022-03-18 NOTE — DISCHARGE SUMMARY
Patient Identification  Wnedy Menchaca is a 63 y.o. male. :  1960  Admit Date:  3/16/2022    Discharge date: 22                                    Disposition: home    Discharge Diagnoses:   Patient Active Problem List   Diagnosis    Hypertension    Hyperlipidemia    Type 1 diabetes mellitus with hypoglycemia and without coma (HCC)    Prostate cancer ([facility] [address]       Consults: medicine    Surgery: s/p ROBOTIC ASSISTED LAPAROSCOPIC RADICAL PROSTATECTOMY WITH BILATERAL LYMPH NODE DISSECTION on 3/16/2022    Patient Instructions: Activity: no heavy lifting, pushing, pulling for 6 weeks, no driving while on analgesics. Diet: As tolerated  Follow-up with Dr Shanta Fuentes in 1 week. Hospital course: Patient underwent s/p ROBOTIC ASSISTED LAPAROSCOPIC RADICAL PROSTATECTOMY WITH BILATERAL LYMPH NODE DISSECTION on 3111 with no complications. Post-operatively he remained stable. Patient is tolerating diet, de jesus draining yellow urine, pain is minimal, ambulating well with assistance, having flatus .       Time spent for discharge 20-25

## 2022-03-18 NOTE — PROGRESS NOTES
Patient ready for discharge, wife at bedside will be transport home and co learner. AVS and discharge instructions reviewed and signed. Questions asked and answered. Chart broken down and placed in yellow bin at [address] pod.

## 2022-03-18 NOTE — PLAN OF CARE
Spoke with pt and his wife as pt was frustrated regarding his insulin regimen not being given correctly subsequently leading to uncontrolled BG and an overnight stay. Pt is anxious for discharge. He feels comfortable going home and pain is controlled. BG is much improved this am at 129 after receiving his correct dosage. Pt medically stable from hospitalist perspective for discharge.     Electronically signed by Sean Gilford, PA-C on 3/18/2022 at 8:34 AM

## 2022-03-18 NOTE — TELEPHONE ENCOUNTER
Patient scheduled for Cystogram on 3/23/2 with follow up in the office after    TV with Dr Jeanna Bruner on 3/29/22 @ 645 pm

## 2022-03-18 NOTE — PROGRESS NOTES
1940: Patient's blood sugar 372 per self monitor. Secure message sent to Spring Lozada MD to request night time dose of lispro (Novolog) to be ordered. 2143: Patient states he would take 16 units lispro (Novolog) and 22 units of Lantus at home for his blood sugar. Zac Woods MD made aware. 2001: Insulin lispro (Humalog) ordered 4 times daily before meals and nightly. Per Zac Woods MD this sliding scale may be used for patients home lispro (Novolog). 2026: Patients blood sugar 209 per self monitor. Patient refused 30 units of lantus. Patient administered 22 units of lantus to self. Patient also refused 18 units of Lispro (Novolog). Patient [id number] units Lispro (Novolog) to self. Patient educated on sliding scale. Patient verbalized understanding, but disagrees with the current insulin order. 2340: Patients blood sugar 186 per self monitor. Will continue to monitor throughout shift.

## 2022-03-18 NOTE — PLAN OF CARE
Problem: Falls - Risk of:  Goal: Will remain free from falls  Description: Will remain free from falls  Outcome: Ongoing  Patient free from falls this shift. Patient uses call light appropriately, bed in lowest position, nonskid socks on, bed rails up x2, fall sign posted and call light in reach. Patient at risk due to surgery. Problem: Falls - Risk of:  Goal: Absence of physical injury  Description: Absence of physical injury  Outcome: Ongoing   No new physical injuries this shift. Problem: Infection:  Goal: Will remain free from infection  Description: Will remain free from infection  Outcome: Ongoing   No new signs or symptoms of infection. Patient afebrile this shift. Problem: Pain:  Goal: Pain level will decrease  Description: Pain level will decrease  Outcome: Ongoing  Patient reporting pain 3/10 with a goal of no pain. Patient's pain controlled with Tylenol per order. Patient satisfied with current interventions including rest and repositioning. Pain assessment ongoing. Problem: Skin Integrity:  Goal: Absence of new skin breakdown  Description: Absence of new skin breakdown  Outcome: Ongoing  No new skin breakdown. Pt turns and repositions self. Will continue on going skin assessment. Care plan reviewed with patient. Patient verbalized understanding of the plan of care and contribute to goal setting.

## 2022-03-18 NOTE — CARE COORDINATION
Patient is discharged to home today with no services added/requested. 3/18/22, 11:03 AM EDT    Patient goals/plan/ treatment preferences discussed by  and . Patient goals/plan/ treatment preferences reviewed with patient/ family. Patient/ family verbalize understanding of discharge plan and are in agreement with goal/plan/treatment preferences. Understanding was demonstrated using the teach back method. AVS provided by RN at time of discharge, which includes all necessary medical information pertaining to the patients current course of illness, treatment, post-discharge goals of care, and treatment preferences.

## 2022-03-18 NOTE — PROGRESS NOTES
Dayn Juan, APRN - CNP  Urology Progress Note    Subjective: Wendy Menchaca is a 63 y.o. male. s/p ROBOTIC ASSISTED LAPAROSCOPIC RADICAL PROSTATECTOMY WITH BILATERAL LYMPH NODE DISSECTION on 3/16/2022    His/Her current Diet is: ADULT DIET; Regular; 3 carb choices (45 gm/meal). Since the previous note, the patient reports the following:  No acute issues overnight. No fevers or chills. No nausea or vomiting. No chest pain or shortness of breath. No calf pain. Pain Controlled. Ambulating. Tolerating PO Diet. BS better controlled 172 per pt monitor      Vitals and Labs:  Patient Vitals for the past 24 hrs:   BP Temp Temp src Pulse Resp SpO2   03/18/22 0345 130/80 98.3 °F (36.8 °C) Oral 82 18 96 %   03/17/22 2030 (!) 141/80 98.8 °F (37.1 °C) Oral 77 16 99 %   03/17/22 1620 138/77 98.8 °F (37.1 °C) Oral 91 16 98 %   03/17/22 1057 (!) 149/85 97.9 °F (36.6 °C) Oral 89 18 98 %     I/O last 3 completed shifts: In: 1320 [P.O.:1300; I.V.:20]  Out: 8000 [Urine:8000]    Recent Labs     03/16/22 1955 03/17/22  0543   WBC 11.1* 10.9*   HGB 14.2 13.1*   HCT 43.3 39.0*   MCV 95.2* 92.4    220     Recent Labs     03/16/22 1955 03/17/22  0543    136   K 4.2 4.4    102   CO2 19* 21*   BUN 21 15   CREATININE 1.0 0.8       No results for input(s): COLORU, PHUR, LABCAST, WBCUA, RBCUA, MUCUS, TRICHOMONAS, YEAST, BACTERIA, CLARITYU, SPECGRAV, LEUKOCYTESUR, UROBILINOGEN, [place] in the last 72 hours. Invalid input(s): NITRATE, GLUCOSEUKETONESUAMORPHOUS    Physical Exam:  No acute distress. Awake, alert and oriented. Neck is supple  Regular rate and rhythm. Normal peripheral pulses  No accessory muscles of inspiration. Symmetric chest rise  Soft. Expected incisional tenderness. Active bowel sounds. Abdominal incisions with edges well approximated without active drainage or erythema. Staples intact. Tafoya catheter draining light pink/yellow clear urine     No calf pain.  Minimal/no edema in bilateral lower extremities. Skin is warm, dry  Psych: mood, affect normal    Additional Lab/Culture results:     Imaging Reviewed:     ZEINA Menendez CNP independently reviewed the images and verified the radiology reports from:    No results found. Impression:    Patient Active Problem List   Diagnosis    Hypertension    Hyperlipidemia    Type 1 diabetes mellitus with hypoglycemia and without coma [facility]    Prostate cancer ([facility] [address] [facility]       s/p ROBOTIC ASSISTED LAPAROSCOPIC RADICAL PROSTATECTOMY WITH BILATERAL LYMPH NODE DISSECTION on 3/16/2022    Plan:   Will be discharged with de jesus catheter  BS better controlled - 129 this AM  Medicine ok with discharge  Cystogram in 1 wk, our office will coordinate      ZEINA Menendez CNP  7:51 AM 3/18/2022

## 2022-03-21 ENCOUNTER — TELEPHONE (OUTPATIENT)
Dept: FAMILY MEDICINE CLINIC | Age: 62
End: 2022-03-21

## 2022-03-21 NOTE — TELEPHONE ENCOUNTER
[facility] Transitions Initial Follow Up Call    Call within 2 business days of discharge: Yes     Patient: Darien Pat Patient : 1960   MRN: [id number]  Reason for Admission: Prostate cancer  Discharge Date: 3/17/22 RARS: No data recorded     Spoke with: Patient     Discharge department/facility: [facility]    Non-face-to-face services provided:  Scheduled appointment with PCP-2022    Follow Up  Future Appointments   Date Time Provider Juan Manuel Cole   3/23/2022  9:30 AM STR FLUOR[facility] ROOM 4 STRZ RAD STR Radiolog   3/23/2022 10:00 AM SCHEDULE, [facility] - [place] [address]   3/24/2022  8:30 AM [facility] [place]   3/29/2022  6:45 PM MD PATEL Long P [place]   3/30/2022  9:00 AM Candida Lopez [address] [place] [address]   2022  8:15 AM [facility] [address]

## 2022-03-23 ENCOUNTER — HOSPITAL ENCOUNTER (OUTPATIENT)
Dept: GENERAL RADIOLOGY | Age: 62
Discharge: HOME | End: 2022-03-23
Payer: COMMERCIAL

## 2022-03-23 ENCOUNTER — NURSE ONLY (OUTPATIENT)
Dept: UROLOGY | Age: 62
End: 2022-03-23

## 2022-03-23 DIAGNOSIS — C61 PROSTATE CANCER (HCC): ICD-10-CM

## 2022-03-23 DIAGNOSIS — C61 PROSTATE CANCER (HCC): Primary | ICD-10-CM

## 2022-03-23 PROCEDURE — 6360000004 HC RX CONTRAST MEDICATION: Performed by: UROLOGY

## 2022-03-23 PROCEDURE — 51600 INJECTION FOR BLADDER X-RAY: CPT

## 2022-03-23 PROCEDURE — 99999 PR OFFICE/OUTPT VISIT,PROCEDURE ONLY: CPT | Performed by: NURSE PRACTITIONER

## 2022-03-23 PROCEDURE — 74430 CONTRAST X-RAY BLADDER: CPT

## 2022-03-23 RX ADMIN — IOTHALAMATE MEGLUMINE 250 ML: 172 INJECTION URETERAL at 09:10

## 2022-03-23 NOTE — PROGRESS NOTES
Patient has given me verbal consent to perform de jesus removal  Yes    26 cc of water deflated from de jesus balloon. 20 Fr de jesus removed without difficulty. Pt will drink fluids today. Pt will then call office by 3pm if he isn't able to urinate on his own and we will ask provider what the plan should be. Pt will drink fluids and call in 4-6 hours if patient has not urinated. F/u with provider as scheduled. Staples removed without difficulty.

## 2022-03-23 NOTE — PROGRESS NOTES
I have personally verified, reviewed, released, signed, authenticated, authorized, confirmed,finalized, and approved the actions of the CMA. Remove de jesus catheter and staples today in office.

## 2022-03-24 ENCOUNTER — OFFICE VISIT (OUTPATIENT)
Dept: FAMILY MEDICINE CLINIC | Age: 62
End: 2022-03-24
Payer: COMMERCIAL

## 2022-03-24 VITALS
WEIGHT: 204.1 LBS | SYSTOLIC BLOOD PRESSURE: 122 MMHG | DIASTOLIC BLOOD PRESSURE: 72 MMHG | TEMPERATURE: 98.5 F | HEART RATE: 78 BPM | RESPIRATION RATE: 18 BRPM | BODY MASS INDEX: 29.29 KG/M2

## 2022-03-24 DIAGNOSIS — I10 PRIMARY HYPERTENSION: Chronic | ICD-10-CM

## 2022-03-24 DIAGNOSIS — C61 PROSTATE CANCER (HCC): Primary | ICD-10-CM

## 2022-03-24 DIAGNOSIS — E10.9 TYPE 1 DIABETES MELLITUS WITHOUT COMPLICATION (HCC): ICD-10-CM

## 2022-03-24 DIAGNOSIS — E78.5 HYPERLIPIDEMIA, UNSPECIFIED HYPERLIPIDEMIA TYPE: Chronic | ICD-10-CM

## 2022-03-24 PROCEDURE — 1111F DSCHRG MED/CURRENT MED MERGE: CPT | Performed by: FAMILY MEDICINE

## 2022-03-24 PROCEDURE — 3044F HG A1C LEVEL LT 7.0%: CPT | Performed by: FAMILY MEDICINE

## 2022-03-24 PROCEDURE — 99495 TRANSJ CARE MGMT MOD F2F 14D: CPT | Performed by: FAMILY MEDICINE

## 2022-03-24 NOTE — PROGRESS NOTES
Post-Discharge Transitional Care  Follow Up      Jeremiah Almazan   YOB: 1960    Date of Office Visit:  3/24/2022   Date of Hospital Admission: 3/16/22  Date of Hospital Discharge: 3/17/22  Risk of hospital readmission (high >=14%. Medium >=10%) :No data recorded    Care management risk score Rising risk (score 2-5) and Complex Care (Scores >=6): 2     Non face to face  following discharge, date last encounter closed (first attempt may have been earlier): 3/21/2022  4:54 PM    Call initiated 2 business days of discharge: Yes    ASSESSMENT/PLAN:   Prostate cancer ([address]  Type 1 diabetes mellitus without complication ([address]  Primary hypertension  Hyperlipidemia, unspecified hyperlipidemia type      Medical Decision Making: moderate complexity  No follow-ups on file. On this date 3/24/2022 I have spent 30 minutes reviewing previous notes, test results and face to face with the patient discussing the diagnosis and importance of compliance with the treatment plan as well as documenting on the day of the visit. Subjective:   HPI:  Follow up of Hospital problems/diagnosis(es):    Diagnosis Orders   1. Prostate cancer ([address]     2. Type 1 diabetes mellitus without complication (HCC)     3. Primary hypertension     4. Hyperlipidemia, unspecified hyperlipidemia type           Inpatient course: Discharge summary reviewed- see chart.     Interval history/Current status: stable    Patient Active Problem List   Diagnosis    Hypertension    Hyperlipidemia    Type 1 diabetes mellitus ([address]    Prostate cancer ([facility]       Medications listed as ordered at the time of discharge from hospital  [unfilled]      Medications marked \"taking\" at this time  Outpatient Medications Marked as Taking for the 3/24/22 encounter (Office Visit) with Maryanne Villarreal MD   Medication Sig Dispense Refill    bisacodyl (DULCOLAX) 5 MG EC tablet Take 1 tablet by mouth daily as needed for Constipation 30 tablet 0    sennosides-docusate sodium (SENOKOT-S) 8.6-50 MG tablet Take 1 tablet by mouth 2 times daily 30 tablet 0    Multiple Vitamins-Minerals (THERAPEUTIC MULTIVITAMIN-MINERALS) tablet Take 1 tablet by mouth daily      insulin aspart (NOVOLOG FLEXPEN) 100 UNIT/ML injection pen INJECT 0 TO 30 UNITS SUBCUTEANOUSLY THREE TIMES A DAY PER SLIDING SCALE. (Patient taking differently: 140-199 5 units  not eating, 15 units- does eat   200-249 8 unit  not eating, 18 units -does eat  250-299 14 not eating, 24 units- does eat   300-349- 20  not eating, 34 units- does eat   350-400 - 28 not eating, 38 units -does eat  over 400 - upto 45 units) 30 mL 5    insulin glargine (LANTUS SOLOSTAR) 100 UNIT/ML injection pen INJECT 22 UNITS INTO THE SKIN NIGHTLY 45 mL 1    simvastatin (ZOCOR) 40 MG tablet take 1 tablet by mouth every evening 90 tablet 3    losartan (COZAAR) 100 MG tablet take 1 tablet by mouth once daily (Patient taking differently: nightly take 1 tablet by mouth once daily) 90 tablet 3    hydroCHLOROthiazide (HYDRODIURIL) 25 MG tablet Take 1 tablet by mouth every morning 90 tablet 3    amLODIPine (NORVASC) 10 MG tablet take 1 tablet by mouth once daily (Patient taking differently: nightly take 1 tablet by mouth once daily) 90 tablet 3    blood glucose monitor strips Test 1time a day & as needed for symptoms of irregular blood glucose. Dispense sufficient amount for indicated testing frequency plus additional to accommodate PRN testing needs.  100 strip 3    Continuous Blood Gluc Sensor (DEXCOM G6 SENSOR) MISC Change Sensor every 7-10 days Diagnosis:E11.9 Diagnosis:E11.9 12 each 3    Continuous Blood Gluc  (DEXCOM G6 ) LUDWIN Test blood sugar 8 times daily and as needed Diagnosis:E11.9 1 Device 0    Continuous Blood Gluc Transmit (DEXCOM G6 TRANSMITTER) MISC Change transmitter every 7-10 days Diagnosis:E11.9 12 each 3    aspirin 81 MG EC tablet Take 81 mg by mouth daily            Medications patient taking as of now reconciled against medications ordered at time of hospital discharge: Yes    A comprehensive review of systems was negative except for what was noted in the HPI. Objective:    /72 (Site: Right Upper Arm)   Pulse 78   Temp 98.5 °F (36.9 °C) (Oral)   Resp 18   Wt 204 lb 1.6 oz (92.6 kg)   BMI 29.29 kg/m²   General Appearance: alert and oriented to person, place and time, well developed and well- nourished, in no acute distress  Skin: warm and dry, no rash or erythema  Head: normocephalic and atraumatic  Eyes: pupils equal, round, and reactive to light, extraocular eye movements intact, conjunctivae normal  ENT: tympanic membrane, external ear and ear canal normal bilaterally, nose without deformity, nasal mucosa and turbinates normal without polyps  Neck: supple and non-tender without mass, no thyromegaly or thyroid nodules, no cervical lymphadenopathy  Pulmonary/Chest: clear to auscultation bilaterally- no wheezes, rales or rhonchi, normal air movement, no respiratory distress  Cardiovascular: normal rate, regular rhythm, normal S1 and S2, no murmurs, rubs, clicks, or gallops, distal pulses intact, no carotid bruits  Abdomen: soft, non-tender, non-distended, normal bowel sounds, no masses or organomegaly  Extremities: no cyanosis, clubbing or edema  Musculoskeletal: normal range of motion, no joint swelling, deformity or tenderness  Neurologic: reflexes normal and symmetric, no cranial nerve deficit, gait, coordination and speech normal      An electronic signature was used to authenticate this note.   --Isidro Stewart MD

## 2022-03-29 ENCOUNTER — SCHEDULED TELEPHONE ENCOUNTER (OUTPATIENT)
Dept: UROLOGY | Age: 62
End: 2022-03-29

## 2022-03-29 PROCEDURE — 99024 POSTOP FOLLOW-UP VISIT: CPT | Performed by: UROLOGY

## 2022-03-29 NOTE — PROGRESS NOTES
Adrien Bird is a 63 y.o. male evaluated via telephone on 3/29/2022 for No chief complaint on file. .        Documentation:  I communicated with the patient and/or health care decision maker about prostate cancer  . Details of this discussion including any medical advice provided:       FINAL DIAGNOSIS:   A.  Prostate, radical prostatectomy:             Adenocarcinoma of the prostate, involving less than 5% of   prostatic tissue, Curtis       score 3+4 = 7 (grade group 2), limited to the prostate, with   perineural invasion,       stage pT2 N0.    Margins negative for carcinoma.    High grade prostatic intraepithelial neoplasia. B.  Lymph nodes, bilateral pelvic, excision:             Two lymph nodes, negative for malignancy (0/2).  See microscopic description. Plan:  Six weeks with PSA        Total Time: minutes: 5-10 minutes    Adrien Bird was evaluated through a synchronous (real-time) audio encounter. Patient identification was verified at the start of the visit. He (or guardian if applicable) is aware that this is a billable service, which includes applicable co-pays. This visit was conducted with the patient's (and/or legal guardian's) verbal consent. He has not had a related appointment within my department in the past 7 days or scheduled within the next 24 hours. The patient was located in a state where the provider was licensed to provide care.     Note: not billable if this call serves to triage the patient into an appointment for the relevant concern    Lawanda Persaud MD

## 2022-03-30 ENCOUNTER — HOSPITAL ENCOUNTER (OUTPATIENT)
Dept: PHYSICAL THERAPY | Age: 62
Setting detail: THERAPIES SERIES
Discharge: HOME | End: 2022-03-30
Payer: COMMERCIAL

## 2022-03-30 DIAGNOSIS — E11.9 CONTROLLED TYPE 2 DIABETES MELLITUS WITHOUT COMPLICATION, WITH LONG-TERM CURRENT USE OF INSULIN (HCC): ICD-10-CM

## 2022-03-30 DIAGNOSIS — Z79.4 CONTROLLED TYPE 2 DIABETES MELLITUS WITHOUT COMPLICATION, WITH LONG-TERM CURRENT USE OF INSULIN (HCC): ICD-10-CM

## 2022-03-30 PROCEDURE — 97530 THERAPEUTIC ACTIVITIES: CPT | Performed by: PHYSICAL THERAPIST

## 2022-03-30 PROCEDURE — 97164 PT RE-EVAL EST PLAN CARE: CPT | Performed by: PHYSICAL THERAPIST

## 2022-03-30 RX ORDER — BLOOD-GLUCOSE SENSOR
EACH MISCELLANEOUS
Qty: 12 EACH | Refills: 3 | Status: SHIPPED | OUTPATIENT
Start: 2022-03-30

## 2022-03-30 RX ORDER — BLOOD-GLUCOSE TRANSMITTER
EACH MISCELLANEOUS
Qty: 3 EACH | Refills: 3 | Status: SHIPPED | OUTPATIENT
Start: 2022-03-30

## 2022-03-30 NOTE — PROGRESS NOTES
** PLEASE SIGN, DATE AND TIME CERTIFICATION BELOW AND RETURN TO [facility] OUTPATIENT REHABILITATION (FAX #: [phone]). ATTEST/CO-SIGN IF ACCESSING VIA [employer]. THANK YOU.**    I certify that I have examined the patient below and determined that Physical Medicine and Rehabilitation service is necessary and that I approve the established plan of care for up to 90 days or as specifically noted. Attestation, signature or co-signature of physician indicates approval of certification requirements.    ________________________ ____________ __________  Physician Signature   Date   Time  [facility]  PHYSICAL THERAPY  OUTPATIENT REHABILITATION - SPECIALIZED THERAPY SERVICES  [x] [address]  [] DAILY NOTE  [] PROGRESS NOTE [] DISCHARGE NOTE    Date: 3/30/2022  Patient Name:  Giacomo Bell  : 1960  MRN: [id number]  CSN: [id number]    Referring Practitioner Stanislaw Cage MD   Diagnosis Malignant neoplasm of prostate [C61]    Treatment Diagnosis M62.58 - Muscle Wasting and Atrophy, nec, other site  M62.89 - Other Specified Disorders of Muscle N39.3 MARTINEZ male; R 35.0 urinary frequency   Date of Evaluation 22    Additional Pertinent History HTN, Type 1 DM      Functional Outcome Measure Used IIQ and LETICIA   Functional Outcome Score 1 and 0 (22)   14 and 8 (3/30/2022)      Insurance: Primary: Payor: Sinan Hansen /  /  / ,   Secondary:    Authorization Information: No pre-cert req'd   Visit # 2, 2/10 for progress note   Visits Allowed: 61 visits per calendar year   Recertification Date: 1747   Physician Follow-Up:    Physician Orders: eval and treat   History of Present Illness: Pt is 64 yo male who returns to PT s/p RALP which was delayed to 3/16/2022 due to weather. CA originally discovered via routine PSA f/b biopsy. Pt denies signif pre-op urinary problems.   Pt does have known enlarged prostate       SUBJECTIVE: Pt is 64 yo male who returns to PT s/p RALP which was delayed to 3/16/2022 due to weather. Pt states surgery went well, 2 nights hospital stay due to blood sugar issues, lymph nodes clear, CA is thought to have been contained to the prostate. Catheter and staples removed 3/23/2022 with mild to mod urinary incont thus far, sharad daytime with strains. Spouse Jason Guzman present. Anticipated RTW date is 1st week of May. Social/Functional History and Current Status:  Medications and Allergies have been reviewed and are listed on Medical History Questionnaire. Ami Marquis lives with spouse in a single story home with basement with stairs and a handrail to enter. Task Previous Current   ADLs  Independent Independent   IADL's Independent mod post op urinary incont and lack of bladder control   Ambulation Independent mod post op urinary incont and lack of bladder control   Transfers Independent mod post op urinary incont and lack of bladder control   Recreation Independent likes to be active with yard work and gardening   Community Integration Independent active with [employer]; ex's a few times per wk on TM or elliptical; crunches   Driving Active  Active    Work [employer]. Occupation: [employer]- ; no light duty options Full-Time.        PAIN-- pt denies consistent pain    Pt denies hematuria    BLADDER ASSESSMENT [] Deferred secondary to:   Daily Fluid Ingestion: 5-6 bottles water, 1 large coffee, 1c milk   Urination Frequency Times/Day: every 1-2 hrs or so  Times/Night: 1 usually   Volume Small to Medium   Urge Sensation Slightly diminished   SYMPTOM QUESTIONNAIRE   Loses Urine Upon: Sneezing, coughing, standing up/changing position, squat, bend   Incontinence Volume: small   Frequency of Leakage: occasional   Wets the Bed: no   Burning/Pain with Urination: no   Difficulty Starting a Stream of Urine: slight   Incomplete Emptying Pt unsure; must wait to void completely   Strain to Empty Bladder: no   Falling Out Feeling: n/a   Urinate more than 7 times/day: no   Use a form of Leakage Protection: 1 Depends for day; none for sleep   Restrict Fluid Intake: no   Stream Strength Weak with dribbling at end of stream       BOWEL ASSESSMENT [] Deferred secondary to:   Frequency: Qd usually   Most Common Stool Consistency: Normal to loose   SYMPTOM QUESTIONNAIRE   Strain to have a BM: no   Include fiber in your diet: no   Take laxatives/enemas regularly: Pt taking 2 stool softeners per day (prescription strength)   Pain with BM: no   Strong urge to have BM: no   Leak/Stain Feces: no   Diarrhea often: no         SEXUAL ASSESSMENT [] Deferred secondary to:   Sexually Active yes: limited due to some ED pre op   Pain with [place] (Dyspareunia) No pain reported     VITALS [] Deferred secondary to:   Height 5' 10\"   Weight 210#       GENERAL ASSESSMENT   [] Deferred secondary to:   Palpation    Observation Abdominal incisions healing well   Posture WFL, pt's L LE is at least 1\" longer than R   Range of Motion Lumbar and LE AROM WFL and without c/o   Strength B LE strength WNL and without c/o; abdominal strength NT due to recent surgery   Gait WNL   Sensation WNL   Edema WNL   Balance/Fall History Denies balance or fall problems   Special Tests Neg SLR, Neg Homans           OBSERVATION  [] Deferred secondary to:   Patient Safety Spouse stayed in room for exam   Skin Condition intact   Urogenital Triangle No tenderness or tightness reported       PELVIC FLOOR EXTERNAL EXAM [] Deferred secondary to:   Exam perineal body   Sensation Intact   Muscle Localization Good   Palpation/Tone Normal   Pelvic Floor Strength PERF:Power: 2-,Endurance: 4,Reps: 10,Flicks: 10   Relax after Contraction Normal       TREATMENT   Precautions:     X in shaded column indicates activity completed today   Modalities Parameters/  Location  Notes                     Manual Therapy Time/Technique  Notes                     Exercise/Intervention    Notes   Basic pelvic anatomy, nature of condition, PT POC 2 min  x    Instruction on precise PFM localization 2 min  x    Post op restrictions, precautions, contraindications 10 min  x And how to progress at wk 5 post op   kegel-quick 1 sec 10 x Do one kegel option every 2 hrs in sitting or lying   kegel-hold 4 sec 10 x    Tra-quick 1 sec 10 x    TrA-hold 4 sec 10 x    Pelvic brace-quick 1 sec 10 x And how to use the brace during fxn'l strains and transitions while exhaling   Pelvic brace-hold 4 sec 10 x                    Specific Interventions Next Treatment: cont to progress all instructions and home routine as per symptoms    Activity/Treatment Tolerance:  [x]  Patient tolerated treatment well  []  Patient limited by fatigue  []  Patient limited by pain   []  Patient limited by medical complications  []  Other:     Patient Education:   [x]  HEP/Education Completed: Rev'd all prior instructions and post op precautions along with how to progress the restrictions starting at wk 5 post op. Progressed kegel endur to 4 sec holds. Instructed TrA, pelvic brace, and how to use the brace during fxn'l strains and transitions while exhaling. HO's given. Instructed pt to work at tapering out of Depends and into guards. []  No new Education completed  []  Reviewed Prior HEP      [x]  Patient verbalized and/or demonstrated understanding of education provided. []  Patient unable to verbalize and/or demonstrate understanding of education provided. Will continue education. [x]  Barriers to learning: none    Assessment: Patient is in need of skilled PT due to recent surgical intervention resulting with urinary incont and lack of bladder control. Pt is in need of rehab of the PFM and abdominal mm's, bladder retraining, and general ongoing post op education.   Body Structures/Functions/Activity Limitations: Recent RALP which resulted with urinary incontinence and lack of bladder control, PFM weakness with decreased localization and endurance, Decreased awareness of functional factors that increase pelvic organ strain, Decreased awareness of normal bladder habits, control, and diet effects on functioning, Abdominal and core weakness, Decreased awareness of safe means of improving abdom strength and stability, Impaired endurance and Impaired strength  Prognosis: Good    GOALS:  Patient Goal: have normal bladder control with no leaking    Goal:  1 visit. Pt will be independent with basic HEP for PFM strengthening and localization of PFM and to fully understand post-op precautions and POC. MET    Short term goals: 6 weeks    1. The pt will demo the ability to independently identify normal bladder function, normal voiding patterns, diet impact on the bladder, and urge control strategies to promote continence and to provide pt insight into factors causing/perpetuating symptoms, and to avoid detrimental toileting habits. 2. The pt will demo a basic knowledge of pelvic floor viscera and musculature in order to promote insight into condition. 3.  The pt will demonstrate good PFM localization in order to maximize the efficacy of strengthening program.    4. The pt will report transition to guard (incontinence product) to indicate functional improvement in continence. 5. The pt will report 40 % improvement since starting therapy to indicate efficacy of treatment. 6. The pt will demonstrate independence with basic HEP designed to increase PFM strength, core strength, and hip girdle flexibility for maximal therapeutic outcome. Long term goals: 12 weeks  1. The pt will decrease urinary leakage by 80% since starting therapy to reduce risk of UTI, and to decrease need for incontinence products. 2. The pt will establish normal voiding patterns of every 2-3 hrs daytime consistently to increase efficiency in his everyday life and to reduce pt frustration with condition.     3. The pt will decrease usage of incontinence care to shield for home; guard for public/work due to improved bladder control. 4. The pt will be independent with advanced HEP in order to maintain gains made in therapy following discharge. 5. IIQ and LETICIA scores improved to 4 or less to indicate functional improvement with therapy intervention. 6. This pt will increase strength of the pelvic floor to 3/10/10/10 to increase continence and to aid in maintaining results. PLAN:  Treatment Recommendations: Strengthening, Functional Mobility Training, Endurance Training, Neuromuscular Re-education, Manual Therapy - Soft Tissue Mobilization, Pain Management, Home Exercise Program, Patient Education and Self-Care Education and Training    [x]  Plan of care initiated. Plan to see patient 1 time pre op f/b 1 time every 2 weeks post op for 12 weeks post op to address the treatment planned outlined above.   [x]  Continue with current plan of care -- resume PT post op 1 time every 2 weeks for 12 weeks  []  Modify plan of care as follows:    []  Hold pending physician visit  []  Discharge    Time In 9:00   Time Out 10:00   Timed Code Minutes: 45 min   Total Treatment Time: 60 min       Electronically Signed by: JOSESITO MANUEL

## 2022-03-31 DIAGNOSIS — C61 PROSTATE CANCER (HCC): Primary | ICD-10-CM

## 2022-04-04 ENCOUNTER — TELEPHONE (OUTPATIENT)
Dept: UROLOGY | Age: 62
End: 2022-04-04

## 2022-04-04 NOTE — TELEPHONE ENCOUNTER
Pt called regarding slight blood in his urine. He passed a few small, soft blood clots. No frequency, urgency, retention, fever or chills. He underwent Robotic Assisted Laparoscopic Prostatectomy with Bilateral Pelvic Lymph node dissection and clarix nerve wrap for neurovascular bundle on 3-. Please advised, Thank you.

## 2022-04-04 NOTE — TELEPHONE ENCOUNTER
Increase fluids  Hold ASA for 72 hrs if able  Call for worsening hematuria  If worsens, will check Ua for infection

## 2022-04-05 NOTE — TELEPHONE ENCOUNTER
Advised pt to increase fluids. If worsening symptoms will call back. Pt stated hematuria has resolved.

## 2022-04-25 DIAGNOSIS — E10.649 TYPE 1 DIABETES MELLITUS WITH HYPOGLYCEMIA AND WITHOUT COMA (HCC): ICD-10-CM

## 2022-04-25 RX ORDER — SIMVASTATIN 40 MG
TABLET ORAL
Qty: 90 TABLET | Refills: 1 | Status: SHIPPED | OUTPATIENT
Start: 2022-04-25 | End: 2022-10-26

## 2022-04-27 ENCOUNTER — HOSPITAL ENCOUNTER (OUTPATIENT)
Dept: PHYSICAL THERAPY | Age: 62
Setting detail: THERAPIES SERIES
Discharge: HOME | End: 2022-04-27
Payer: COMMERCIAL

## 2022-04-27 PROCEDURE — 97530 THERAPEUTIC ACTIVITIES: CPT | Performed by: PHYSICAL THERAPIST

## 2022-04-27 NOTE — PROGRESS NOTES
[facility]  PHYSICAL THERAPY  OUTPATIENT REHABILITATION - SPECIALIZED THERAPY SERVICES  [] PELVIC HEALTH RE-EVALUATION  [x] DAILY NOTE  [] PROGRESS NOTE [] DISCHARGE NOTE    Date: 2022  Patient Name:  Patrick Pleitez  : 1960  MRN: [id number]  CSN: [id number]    Referring Practitioner Valerie Lebron MD   Diagnosis Malignant neoplasm of prostate [C61]    Treatment Diagnosis M62.58 - Muscle Wasting and Atrophy, nec, other site  M62.89 - Other Specified Disorders of Muscle N39.3 MARTINEZ male; R 35.0 urinary frequency   Date of Evaluation 22    Additional Pertinent History HTN, Type 1 DM      Functional Outcome Measure Used IIQ and LETICIA   Functional Outcome Score 1 and 0 (22)   14 and 8 (3/30/2022)      Insurance: Primary: Payor: Tiffany Diez /  /  / ,   Secondary:    Authorization Information: No pre-cert req'd   Visit # 3, 3/10 for progress note   Visits Allowed: 60 visits per calendar year   Recertification Date:    Physician Follow-Up: Sept with Dr Musa Serrato   Physician Orders: eval and treat   History of Present Illness: Pt is 64 yo male who returns to PT s/p RALP which was delayed to 3/16/2022 due to weather. CA originally discovered via routine PSA f/b biopsy. Pt denies signif pre-op urinary problems. Pt does have known enlarged prostate       SUBJECTIVE: Pt states bladder control slowly but steadily improving. Pt is having less frequent and less amnt of incont. Pt has transitioned to guard with good success. 1 pad lasts all day, no protection for sleep. Stream strength is improving, but pt has to strain a bit for this. Spouse Adrian [place] present. Anticipated RTW date is  week of May. Social/Functional History and Current Status:  Medications and Allergies have been reviewed and are listed on Medical History Questionnaire. Patrick Pleitez lives with spouse in a single story home with basement with stairs and a handrail to enter.     Task Previous Current   ADLs Independent Independent   IADL's Independent mod post op urinary incont and lack of bladder control   Ambulation Independent mod post op urinary incont and lack of bladder control   Transfers Independent mod post op urinary incont and lack of bladder control   Recreation Independent likes to be active with yard work and gardening   Community Integration Independent active with [employer] act's; ex's a few times per wk on TM or elliptical; crunches   Driving Active  Active    Work [employer]. Occupation: [employer]- ; no light duty options Full-Time.        PAIN-- pt denies consistent pain    Pt denies hematuria    BLADDER ASSESSMENT [] Deferred secondary to:   Daily Fluid Ingestion: 4-5 bottles water, 1 large coffee, 1c milk   Urination Frequency Times/Day: every 1-2 hrs or so  Times/Night: 1-2   Volume Small to Medium   Urge Sensation Moderate discomfort feeling   SYMPTOM QUESTIONNAIRE   Loses Urine Upon: Sneezing, coughing, standing up/changing position, squat, bend   Incontinence Volume: small   Frequency of Leakage: Occasional -- a few times per day   Wets the Bed: no   Burning/Pain with Urination: no   Difficulty Starting a Stream of Urine: no   Incomplete Emptying Pt unsure; must wait to void completely or push   Strain to Empty Bladder: Yes a bit at the end   Falling Out Feeling: n/a   Urinate more than 7 times/day: no   Use a form of Leakage Protection: 1 guard for day; none for sleep   Restrict Fluid Intake: no   Stream Strength Average       BOWEL ASSESSMENT [] Deferred secondary to:   Frequency: Qd usually   Most Common Stool Consistency: Normal to loose   SYMPTOM QUESTIONNAIRE   Strain to have a BM: no   Include fiber in your diet: no   Take laxatives/enemas regularly: Pt taking 1 stool softener per day (prescription strength)   Pain with BM: no   Strong urge to have BM: no   Leak/Stain Feces: no   Diarrhea often: no         SEXUAL ASSESSMENT [] Deferred secondary to:   Sexually Active yes: limited due to some ED pre op   Pain with [place] (Dyspareunia) No pain reported     VITALS [] Deferred secondary to:   Height 5' 10\"   Weight 210#       GENERAL ASSESSMENT   [] Deferred secondary to:   Palpation    Observation Abdominal incisions healing well   Posture WFL, pt's L LE is at least 1\" longer than R   Range of Motion Lumbar and LE AROM WFL and without c/o   Strength B LE strength WNL and without c/o; abdominal strength NT due to recent surgery   Gait WNL   Sensation WNL   Edema WNL   Balance/Fall History Denies balance or fall problems   Special Tests Neg SLR, Neg Homans           OBSERVATION  [] Deferred secondary to:   Patient Safety Spouse stayed in room for exam   Skin Condition intact   Urogenital Triangle No tenderness or tightness reported       PELVIC FLOOR EXTERNAL EXAM [] Deferred secondary to:   Exam perineal body   Sensation Intact   Muscle Localization Good   Palpation/Tone Normal   Pelvic Floor Strength PERF:Power: 2-,Endurance: 4,Reps: 10,Flicks: 10   Relax after Contraction Normal       TREATMENT   Precautions:     X in shaded column indicates activity completed today   Modalities Parameters/  Location  Notes                     Manual Therapy Time/Technique  Notes                     Exercise/Intervention    Notes   Basic pelvic anatomy, nature of condition, PT POC 2 min  x    Instruction on precise PFM localization 2 min  x    Post op restrictions, precautions, contraindications 10 min  x And how to progress at wk 5 post op   kegel-quick 1 sec 10 x Do one kegel option every 2 hrs in sitting or lying   kegel-hold 5 sec 10 x    Tra-quick 1 sec 10 x    TrA-hold 5 sec 10 x    Pelvic brace-quick 1 sec 10 x And how to use the brace during fxn'l strains and transitions while exhaling   Pelvic brace-hold 5 sec 10 x    kegel with hip IR 5 sec 10 x    kegel with hip ER 5 sec 10 x           Pelvic tilt 5 sec 10 x bid                   Specific Interventions Next Treatment: cont to progress all instructions and home routine as per symptoms    Activity/Treatment Tolerance:  [x]  Patient tolerated treatment well  []  Patient limited by fatigue  []  Patient limited by pain   []  Patient limited by medical complications  []  Other:     Patient Education:   [x]  HEP/Education Completed: Rev'd all prior instructions and post op precautions. Answered several questions had re: activity levels, ED, and some concerns with increased scrotal sensitivity. Progressed kegel endur to 5 sec holds. Reminded fxn'l use of the the pelvic brace during strains and transitions while exhaling. Instructed kegel with hip IR and ER along with pelvic tilt. Instructed concept of gaining abdominal control in inf to sup manner to provide optimal bladder support. HO's given along with checklist grid of home routine to this point. []  No new Education completed  []  Reviewed Prior HEP      [x]  Patient verbalized and/or demonstrated understanding of education provided. []  Patient unable to verbalize and/or demonstrate understanding of education provided. Will continue education. [x]  Barriers to learning: none    Assessment: Patient is progressing steadily with his bladder control. Pt has transitioned out of Depends and into guard. Pt has fairly good success with fxn'l use of pelvic brace. Good home compliance. Body Structures/Functions/Activity Limitations: Recent RALP which resulted with urinary incontinence and lack of bladder control, PFM weakness with decreased localization and endurance, Decreased awareness of functional factors that increase pelvic organ strain, Decreased awareness of normal bladder habits, control, and diet effects on functioning, Abdominal and core weakness, Decreased awareness of safe means of improving abdom strength and stability, Impaired endurance and Impaired strength  Prognosis: Good    GOALS:  Patient Goal: have normal bladder control with no leaking    Goal:  1 visit.    Pt will be independent with basic HEP for PFM strengthening and localization of PFM and to fully understand post-op precautions and POC. MET    Short term goals: 6 weeks    1. The pt will demo the ability to independently identify normal bladder function, normal voiding patterns, diet impact on the bladder, and urge control strategies to promote continence and to provide pt insight into factors causing/perpetuating symptoms, and to avoid detrimental toileting habits. 2. The pt will demo a basic knowledge of pelvic floor viscera and musculature in order to promote insight into condition. 3.  The pt will demonstrate good PFM localization in order to maximize the efficacy of strengthening program.    4. The pt will report transition to guard (incontinence product) to indicate functional improvement in continence. 5. The pt will report 40 % improvement since starting therapy to indicate efficacy of treatment. 6. The pt will demonstrate independence with basic HEP designed to increase PFM strength, core strength, and hip girdle flexibility for maximal therapeutic outcome. Long term goals: 12 weeks  1. The pt will decrease urinary leakage by 80% since starting therapy to reduce risk of UTI, and to decrease need for incontinence products. 2. The pt will establish normal voiding patterns of every 2-3 hrs daytime consistently to increase efficiency in his everyday life and to reduce pt frustration with condition. 3. The pt will decrease usage of incontinence care to shield for home; guard for public/work due to improved bladder control. 4. The pt will be independent with advanced HEP in order to maintain gains made in therapy following discharge. 5. IIQ and LETICIA scores improved to 4 or less to indicate functional improvement with therapy intervention. 6. This pt will increase strength of the pelvic floor to 3/10/10/10 to increase continence and to aid in maintaining results. PLAN:  Treatment Recommendations: Strengthening, Functional Mobility Training, Endurance Training, Neuromuscular Re-education, Manual Therapy - Soft Tissue Mobilization, Pain Management, Home Exercise Program, Patient Education and Self-Care Education and Training    [x]  Plan of care initiated. Plan to see patient 1 time pre op f/b 1 time every 2 weeks post op for 12 weeks post op to address the treatment planned outlined above.   [x]  Continue with current plan of care -- resume PT post op 1 time every 2 weeks for 12 weeks  []  Modify plan of care as follows:    []  Hold pending physician visit  []  Discharge    Time In 9:00   Time Out 10:00   Timed Code Minutes: 45 min   Total Treatment Time: 60 min       Electronically Signed by: JOSESITO MANUEL

## 2022-05-09 RX ORDER — AMLODIPINE BESYLATE 10 MG/1
TABLET ORAL
Qty: 90 TABLET | Refills: 2 | Status: SHIPPED | OUTPATIENT
Start: 2022-05-09

## 2022-05-09 RX ORDER — LOSARTAN POTASSIUM 100 MG/1
TABLET ORAL
Qty: 90 TABLET | Refills: 2 | Status: SHIPPED | OUTPATIENT
Start: 2022-05-09

## 2022-05-10 ENCOUNTER — HOSPITAL ENCOUNTER (OUTPATIENT)
Dept: PHYSICAL THERAPY | Age: 62
Setting detail: THERAPIES SERIES
Discharge: HOME | End: 2022-05-10
Payer: COMMERCIAL

## 2022-05-10 PROCEDURE — 97530 THERAPEUTIC ACTIVITIES: CPT | Performed by: PHYSICAL THERAPIST

## 2022-05-10 NOTE — PROGRESS NOTES
[id number]  PHYSICAL THERAPY  OUTPATIENT REHABILITATION - SPECIALIZED THERAPY SERVICES  [] PELVIC HEALTH RE-EVALUATION  [x] DAILY NOTE  [] PROGRESS NOTE [] DISCHARGE NOTE    Date: 5/10/2022  Patient Name:  Ash Adam  : 1960  MRN: [id number]  CSN: [id number]    Referring Practitioner Ismael Woods MD   Diagnosis Malignant neoplasm of prostate [C61]    Treatment Diagnosis M62.58 - Muscle Wasting and Atrophy, nec, other site  M62.89 - Other Specified Disorders of Muscle N39.3 MARTINEZ male; R 35.0 urinary frequency   Date of Evaluation 22    Additional Pertinent History HTN, Type 1 DM      Functional Outcome Measure Used IIQ and LETICIA   Functional Outcome Score 1 and 0 (22)   14 and 8 (3/30/2022)      Insurance: Primary: Payor: Pedro Kennedy 150 /  /  / ,   Secondary:    Authorization Information: No pre-cert req'd   Visit # 4, 4/10 for progress note   Visits Allowed: 60 visits per calendar year   Recertification Date:    Physician Follow-Up: Sept with Dr Rufina Nicole   Physician Orders: eval and treat   History of Present Illness: Pt is 64 yo male who returns to PT s/p RALP which was delayed to 3/16/2022 due to weather. CA originally discovered via routine PSA f/b biopsy. Pt denies signif pre-op urinary problems. Pt does have known enlarged prostate       SUBJECTIVE: Pt did RTW last week, wearing a guard for day and no protection for sleep. Leaking did not worsen upon RTW. Occas leaks a small amnt (couple times per day) with unanticipated strains. Pt still needs to strain slightly at end of urine stream to empty completely. Spouse Orren Milks present. Social/Functional History and Current Status:  Medications and Allergies have been reviewed and are listed on Medical History Questionnaire. Ash Adam lives with spouse in a single story home with basement with stairs and a handrail to enter.     Task Previous Current   ADLs  Independent Independent   IADL's Independent mod post op urinary incont and lack of bladder control   Ambulation Independent mod post op urinary incont and lack of bladder control   Transfers Independent mod post op urinary incont and lack of bladder control   Recreation Independent likes to be active with yard work and gardening   Community Integration Independent active with [employer] act's; ex's a few times per wk on TM or elliptical; crunches   Driving Active  Active    Work [employer]. Occupation: [employer]- ; no light duty options Full-Time.        PAIN-- pt denies consistent pain    Pt denies hematuria    BLADDER ASSESSMENT [] Deferred secondary to:   Daily Fluid Ingestion: 4-5 bottles water, 1 large coffee, 1c milk, occas beer   Urination Frequency Times/Day: every 1-2 hrs or so  Times/Night: 1-2; more freq if had beer   Volume Small to Medium   Urge Sensation Moderate discomfort feeling   SYMPTOM QUESTIONNAIRE   Loses Urine Upon: Sneezing, coughing, standing up/changing position, squat, bend   Incontinence Volume: small   Frequency of Leakage: Occasional -- a few times per day   Wets the Bed: no   Burning/Pain with Urination: no   Difficulty Starting a Stream of Urine: no   Incomplete Emptying Pt unsure; must wait to void completely or push   Strain to Empty Bladder: Yes a bit at the end   Falling Out Feeling: n/a   Urinate more than 7 times/day: no   Use a form of Leakage Protection: 1 guard for day; none for sleep   Restrict Fluid Intake: no   Stream Strength Average       BOWEL ASSESSMENT [] Deferred secondary to:   Frequency: Qd usually   Most Common Stool Consistency: Normal to loose   SYMPTOM QUESTIONNAIRE   Strain to have a BM: no   Include fiber in your diet: no   Take laxatives/enemas regularly: no   Pain with BM: no   Strong urge to have BM: no   Leak/Stain Feces: no   Diarrhea often: no         SEXUAL ASSESSMENT [] Deferred secondary to:   Sexually Active yes: limited due to some ED pre op   Pain with [place] (Dyspareunia) No pain reported     VITALS [] Deferred secondary to:   Height 5' 10\"   Weight 210#       GENERAL ASSESSMENT   [] Deferred secondary to:   Palpation    Observation Abdominal incisions healing well   Posture WFL, pt's L LE is at least 1\" longer than R   Range of Motion Lumbar and LE AROM WFL and without c/o   Strength B LE strength WNL and without c/o; abdominal strength NT due to recent surgery   Gait WNL   Sensation WNL   Edema WNL   Balance/Fall History Denies balance or fall problems   Special Tests Neg SLR, Neg Homans           OBSERVATION  [] Deferred secondary to:   Patient Safety Spouse stayed in room for exam   Skin Condition intact   Urogenital Triangle No tenderness or tightness reported       PELVIC FLOOR EXTERNAL EXAM [] Deferred secondary to:   Exam perineal body   Sensation Intact   Muscle Localization Good   Palpation/Tone Normal   Pelvic Floor Strength PERF:Power: 2-,Endurance: 4,Reps: 10,Flicks: 10   Relax after Contraction Normal       TREATMENT   Precautions:     X in shaded column indicates activity completed today   Modalities Parameters/  Location  Notes                     Manual Therapy Time/Technique  Notes                     Exercise/Intervention    Notes   Basic pelvic anatomy, nature of condition, PT POC 2 min  x    Instruction on precise PFM localization 2 min  x    Post op restrictions, precautions, contraindications 2 min  x And how to progress at wk 5 post op   kegel-quick 1 sec 10 x Do one kegel option every 2 hrs in sitting or lying   kegel-hold 6 sec 10 x Add 1 sec per wk to a max of 10 sec   Tra-quick 1 sec 10 x    TrA-hold 6 sec 10 x    Pelvic brace-quick 1 sec 10 x And how to use the brace during fxn'l strains and transitions while exhaling   Pelvic brace-hold 6 sec 10 x    kegel with hip IR 6 sec 10 x    kegel with hip ER 6 sec 10 x           Pelvic tilt 5 sec 10 x bid   Pelvic tilt with UE  10 x    Pelvic tilt with LE  10 x    Pelvic tilt with Opp  10 x Specific Interventions Next Treatment: cont to progress all instructions and home routine as per symptoms    Activity/Treatment Tolerance:  [x]  Patient tolerated treatment well  []  Patient limited by fatigue  []  Patient limited by pain   []  Patient limited by medical complications  []  Other:     Patient Education:   [x]  HEP/Education Completed: Rev'd all prior instructions and post op precautions. Encouraged pt to work at delaying voiding in 5-10 minute increments to work improving urinary volumes. Progressed kegel endur to 6 sec holds and instructed pt to gain 1 sec per wk to a max of 10 sec. Reminded fxn'l use of the the pelvic brace during strains and transitions while exhaling. Instructed pelvic tilt progressions as listed without c/o. HO given along with checklist grid of home routine to this point. []  No new Education completed  []  Reviewed Prior HEP      [x]  Patient verbalized and/or demonstrated understanding of education provided. []  Patient unable to verbalize and/or demonstrate understanding of education provided. Will continue education. [x]  Barriers to learning: none    Assessment: Pt is cont to do very well with his bladder control. He has RTW without worsening of his condition. He is working with home instructions without issue, and I cont to encourage tapering of protection. I also encouraged pt to work at delaying voiding and reduce the habit of just in case voids.   Body Structures/Functions/Activity Limitations: Recent RALP which resulted with urinary incontinence and lack of bladder control, PFM weakness with decreased localization and endurance, Decreased awareness of functional factors that increase pelvic organ strain, Decreased awareness of normal bladder habits, control, and diet effects on functioning, Abdominal and core weakness, Decreased awareness of safe means of improving abdom strength and stability, Impaired endurance and Impaired strength  Prognosis: Good    GOALS:  Patient Goal: have normal bladder control with no leaking    Goal:  1 visit. Pt will be independent with basic HEP for PFM strengthening and localization of PFM and to fully understand post-op precautions and POC. MET    Short term goals: 6 weeks    1. The pt will demo the ability to independently identify normal bladder function, normal voiding patterns, diet impact on the bladder, and urge control strategies to promote continence and to provide pt insight into factors causing/perpetuating symptoms, and to avoid detrimental toileting habits. 2. The pt will demo a basic knowledge of pelvic floor viscera and musculature in order to promote insight into condition. 3.  The pt will demonstrate good PFM localization in order to maximize the efficacy of strengthening program.    4. The pt will report transition to guard (incontinence product) to indicate functional improvement in continence. 5. The pt will report 40 % improvement since starting therapy to indicate efficacy of treatment. 6. The pt will demonstrate independence with basic HEP designed to increase PFM strength, core strength, and hip girdle flexibility for maximal therapeutic outcome. Long term goals: 12 weeks  1. The pt will decrease urinary leakage by 80% since starting therapy to reduce risk of UTI, and to decrease need for incontinence products. 2. The pt will establish normal voiding patterns of every 2-3 hrs daytime consistently to increase efficiency in his everyday life and to reduce pt frustration with condition. 3. The pt will decrease usage of incontinence care to shield for home; guard for public/work due to improved bladder control. 4. The pt will be independent with advanced HEP in order to maintain gains made in therapy following discharge. 5. IIQ and LETICIA scores improved to 4 or less to indicate functional improvement with therapy intervention.       6. This pt will increase strength of the pelvic floor to 3/10/10/10 to increase continence and to aid in maintaining results. PLAN:  Treatment Recommendations: Strengthening, Functional Mobility Training, Endurance Training, Neuromuscular Re-education, Manual Therapy - Soft Tissue Mobilization, Pain Management, Home Exercise Program, Patient Education and Self-Care Education and Training    [x]  Plan of care initiated. Plan to see patient 1 time pre op f/b 1 time every 2 weeks post op for 12 weeks post op to address the treatment planned outlined above.   [x]  Continue with current plan of care -- resume PT post op 1 time every 2 weeks for 12 weeks  []  Modify plan of care as follows:    []  Hold pending physician visit  []  Discharge    Time In 4:00   Time Out 4:45   Timed Code Minutes: 45 min   Total Treatment Time: 45 min       Electronically Signed by: JOSESITO MANUEL

## 2022-06-01 ENCOUNTER — HOSPITAL ENCOUNTER (OUTPATIENT)
Dept: PHYSICAL THERAPY | Age: 62
Setting detail: THERAPIES SERIES
Discharge: HOME | End: 2022-06-01
Payer: COMMERCIAL

## 2022-06-01 PROCEDURE — 97530 THERAPEUTIC ACTIVITIES: CPT | Performed by: PHYSICAL THERAPIST

## 2022-06-01 NOTE — PROGRESS NOTES
[facility]  PHYSICAL THERAPY  OUTPATIENT REHABILITATION - SPECIALIZED THERAPY SERVICES  [] PELVIC HEALTH RE-EVALUATION  [x] DAILY NOTE  [] PROGRESS NOTE [] DISCHARGE NOTE    Date: 2022  Patient Name:  Hina Ray  : 1960  MRN: [id number]  CSN: [id number]    Referring Practitioner Kiko Brock MD   Diagnosis Malignant neoplasm of prostate [C61]    Treatment Diagnosis M62.58 - Muscle Wasting and Atrophy, nec, other site  M62.89 - Other Specified Disorders of Muscle N39.3 MARTINEZ male; R 35.0 urinary frequency   Date of Evaluation 22    Additional Pertinent History HTN, Type 1 DM      Functional Outcome Measure Used IIQ and LETICIA   Functional Outcome Score 1 and 0 (22)   14 and 8 (3/30/2022)      Insurance: Primary: Payor: Frank Castillo /  /  / ,   Secondary:    Authorization Information: No pre-cert req'd   Visit # 5, /10 for progress note   Visits Allowed: 60 visits per calendar year   Recertification Date:    Physician Follow-Up: Sept with Dr Denise Garcia   Physician Orders: eval and treat   History of Present Illness: Pt is 64 yo male who returns to PT s/p RALP which was delayed to 3/16/2022 due to weather. CA originally discovered via routine PSA f/b biopsy. Pt denies signif pre-op urinary problems. Pt does have known enlarged prostate       SUBJECTIVE: Pt cont to have occas small incont with unexpected strains. He has transitioned from guard to shield with good success. Cont to wear no protection for sleep. Pt cont to have RTW without issues. Pt still needs to strain slightly at end of urine stream to empty completely. Pt also notes some PVD/re-start of urine stream after BM and while still on toilet. Spouse Lindsey Fallen present. Social/Functional History and Current Status:  Medications and Allergies have been reviewed and are listed on Medical History Questionnaire.     Hina Ray lives with spouse in a single story home with basement with stairs and a handrail to enter.    Task Previous Current   ADLs  Independent Independent   IADL's Independent mod post op urinary incont and lack of bladder control   Ambulation Independent mod post op urinary incont and lack of bladder control   Transfers Independent mod post op urinary incont and lack of bladder control   Recreation Independent likes to be active with yard work and gardening   Community Integration Independent active with [employer] act's; ex's a few times per wk on TM or elliptical; crunches   Driving Active  Active    Work [employer]. Occupation: [employer]- ; no light duty options Full-Time.        PAIN-- pt denies consistent pain    Pt denies hematuria    BLADDER ASSESSMENT [] Deferred secondary to:   Daily Fluid Ingestion: 4-5 bottles water, 1 large coffee, 1c milk, occas beer   Urination Frequency Times/Day: every 2 hrs or so  Times/Night: 1-2; more freq if had beer   Volume Medium   Urge Sensation Nearly normal   SYMPTOM QUESTIONNAIRE   Loses Urine Upon:  squat, bend, unexpected strains   Incontinence Volume: small   Frequency of Leakage: Occasional -- a few times per day   Wets the Bed: No; 2 episodes recently of small amnt after beer drinking   Burning/Pain with Urination: no   Difficulty Starting a Stream of Urine: no   Incomplete Emptying Pt unsure; must wait to void completely or push   Strain to Empty Bladder: Yes a bit at the end   Falling Out Feeling: n/a   Urinate more than 7 times/day: no   Use a form of Leakage Protection: 1 shield for day; none for sleep   Restrict Fluid Intake: no   Stream Strength Average       BOWEL ASSESSMENT [] Deferred secondary to:   Frequency: Qd usually   Most Common Stool Consistency: Normal to loose   SYMPTOM QUESTIONNAIRE   Strain to have a BM: no   Include fiber in your diet: no   Take laxatives/enemas regularly: no   Pain with BM: no   Strong urge to have BM: no   Leak/Stain Feces: no   Diarrhea often: no         SEXUAL ASSESSMENT [] Deferred secondary to:   Sexually Active yes: limited due to some ED pre op   Pain with Rafael Capo (Dyspareunia) No pain reported     VITALS [] Deferred secondary to:   Height 5' 10\"   Weight 210#       GENERAL ASSESSMENT   [] Deferred secondary to:   Palpation    Observation Abdominal incisions healing well   Posture WFL, pt's L LE is at least 1\" longer than R   Range of Motion Lumbar and LE AROM WFL and without c/o   Strength B LE strength WNL and without c/o; abdominal strength NT due to recent surgery   Gait WNL   Sensation WNL   Edema WNL   Balance/Fall History Denies balance or fall problems   Special Tests Neg SLR, Neg Homans           OBSERVATION  [] Deferred secondary to:   Patient Safety Spouse stayed in room for exam   Skin Condition intact   Urogenital Triangle No tenderness or tightness reported       PELVIC FLOOR EXTERNAL EXAM [] Deferred secondary to:   Exam perineal body   Sensation Intact   Muscle Localization Good   Palpation/Tone Normal   Pelvic Floor Strength PERF:Power: 2-,Endurance: 4,Reps: 10,Flicks: 10   Relax after Contraction Normal       TREATMENT   Precautions:     X in shaded column indicates activity completed today   Modalities Parameters/  Location  Notes                     Manual Therapy Time/Technique  Notes                     Exercise/Intervention    Notes   Basic pelvic anatomy, nature of condition, PT POC 2 min  x    Instruction on precise PFM localization 2 min  x    Post op restrictions, precautions, contraindications 2 min  x And how to progress at wk 5 post op   kegel-quick 1 sec 10 x Do one kegel option every 2 hrs in sitting or lying   kegel-hold 9 sec 10 x Add 1 sec per wk to a max of 10 sec   Tra-quick 1 sec 10 x Stand up qd for any kegel   TrA-hold 9 sec 10 x    Pelvic brace-quick 1 sec 10 x And how to use the brace during fxn'l strains and transitions while exhaling   Pelvic brace-hold 9 sec 10 x    kegel with hip IR 9 sec 10 x    kegel with hip ER 9 sec 10 x Pelvic tilt 5 sec 10 x bid   Pelvic tilt with UE  10 x    Pelvic tilt with LE  10 x    Pelvic tilt with Opp  10 x    Pelvic tilt with ball/bridge/kegel  10 x    Pelvic tilt with heel walk  10 x    Pelvic tilt with isometric 5 sec 10 x    Pelvic tilt with leg lowering  10 x             Specific Interventions Next Treatment: make final program progressions    Activity/Treatment Tolerance:  [x]  Patient tolerated treatment well  []  Patient limited by fatigue  []  Patient limited by pain   []  Patient limited by medical complications  []  Other:     Patient Education:   [x]  HEP/Education Completed: Rev'd all prior instructions with pt. Progressed kegel endur to 9 sec holds and instructed standing as position option qd for any kegel. Reminded fxn'l use of the the pelvic brace during strains and transitions while exhaling. Instructed advanced pelvic tilt progressions as listed without c/o. HO given along with checklist grid of home routine to this point. []  No new Education completed  []  Reviewed Prior HEP      [x]  Patient verbalized and/or demonstrated understanding of education provided. []  Patient unable to verbalize and/or demonstrate understanding of education provided. Will continue education. [x]  Barriers to learning: none    Assessment: Pt has transitioned from guard to shield with good success, and his bladder is holding better volumes. Pt still has some small leaks with strains, sharad if he is unable to pelvic brace. Good home compliance.   Body Structures/Functions/Activity Limitations: Recent RALP which resulted with urinary incontinence and lack of bladder control, PFM weakness with decreased localization and endurance, Decreased awareness of functional factors that increase pelvic organ strain, Decreased awareness of normal bladder habits, control, and diet effects on functioning, Abdominal and core weakness, Decreased awareness of safe means of improving abdom strength and stability, Impaired endurance and Impaired strength  Prognosis: Good    GOALS:  Patient Goal: have normal bladder control with no leaking    Goal:  1 visit. Pt will be independent with basic HEP for PFM strengthening and localization of PFM and to fully understand post-op precautions and POC. MET    Short term goals: 6 weeks    1. The pt will demo the ability to independently identify normal bladder function, normal voiding patterns, diet impact on the bladder, and urge control strategies to promote continence and to provide pt insight into factors causing/perpetuating symptoms, and to avoid detrimental toileting habits. 2. The pt will demo a basic knowledge of pelvic floor viscera and musculature in order to promote insight into condition. 3.  The pt will demonstrate good PFM localization in order to maximize the efficacy of strengthening program.    4. The pt will report transition to guard (incontinence product) to indicate functional improvement in continence. 5. The pt will report 40 % improvement since starting therapy to indicate efficacy of treatment. 6. The pt will demonstrate independence with basic HEP designed to increase PFM strength, core strength, and hip girdle flexibility for maximal therapeutic outcome. Long term goals: 12 weeks  1. The pt will decrease urinary leakage by 80% since starting therapy to reduce risk of UTI, and to decrease need for incontinence products. 2. The pt will establish normal voiding patterns of every 2-3 hrs daytime consistently to increase efficiency in his everyday life and to reduce pt frustration with condition. 3. The pt will decrease usage of incontinence care to shield for home; guard for public/work due to improved bladder control. 4. The pt will be independent with advanced HEP in order to maintain gains made in therapy following discharge. 5. IIQ and LETICIA scores improved to 4 or less to indicate functional improvement with therapy intervention. 6. This pt will increase strength of the pelvic floor to 3/10/10/10 to increase continence and to aid in maintaining results. PLAN:  Treatment Recommendations: Strengthening, Functional Mobility Training, Endurance Training, Neuromuscular Re-education, Manual Therapy - Soft Tissue Mobilization, Pain Management, Home Exercise Program, Patient Education and Self-Care Education and Training    [x]  Plan of care initiated. Plan to see patient 1 time pre op f/b 1 time every 2 weeks post op for 12 weeks post op to address the treatment planned outlined above.   [x]  Continue with current plan of care -- anticipate 1-2 more visits to finalize program  []  Modify plan of care as follows:    []  Hold pending physician visit  []  Discharge    Time In 4:00   Time Out 4:55   Timed Code Minutes: 55 min   Total Treatment Time: 55 min       Electronically Signed by: JOSESITO MANUEL

## 2022-06-08 ENCOUNTER — HOSPITAL ENCOUNTER (OUTPATIENT)
Age: 62
Discharge: HOME | End: 2022-06-08
Payer: COMMERCIAL

## 2022-06-08 DIAGNOSIS — C61 PROSTATE CANCER (HCC): ICD-10-CM

## 2022-06-08 PROCEDURE — 36415 COLL VENOUS BLD VENIPUNCTURE: CPT

## 2022-06-08 PROCEDURE — 84153 ASSAY OF PSA TOTAL: CPT

## 2022-06-09 LAB — PROSTATE SPECIFIC ANTIGEN: < 0.02 NG/ML (ref 0–1)

## 2022-06-21 ENCOUNTER — OFFICE VISIT (OUTPATIENT)
Dept: UROLOGY | Age: 62
End: 2022-06-21
Payer: COMMERCIAL

## 2022-06-21 VITALS — HEIGHT: 70 IN | BODY MASS INDEX: 28.63 KG/M2 | WEIGHT: 200 LBS

## 2022-06-21 DIAGNOSIS — C61 PROSTATE CANCER (HCC): Primary | ICD-10-CM

## 2022-06-21 DIAGNOSIS — N52.9 ERECTILE DYSFUNCTION, UNSPECIFIED ERECTILE DYSFUNCTION TYPE: ICD-10-CM

## 2022-06-21 DIAGNOSIS — N39.3 SUI (STRESS URINARY INCONTINENCE), MALE: ICD-10-CM

## 2022-06-21 PROCEDURE — 99214 OFFICE O/P EST MOD 30 MIN: CPT | Performed by: UROLOGY

## 2022-06-21 RX ORDER — TADALAFIL 5 MG/1
5 TABLET ORAL DAILY
Qty: 30 TABLET | Refills: 11 | Status: SHIPPED | OUTPATIENT
Start: 2022-06-21

## 2022-06-21 NOTE — PROGRESS NOTES
[address]  [address] [place] [id number]  Dept: [phone]  Dept Fax: [phone]  Loc: [address] [facility] Urology Office Note -     Patient:  Amira Grace  YOB: 1960    The patient is a 63 y.o. male who presents today for evaluation of the following problems:   Chief Complaint   Patient presents with    Prostate Cancer     post RARLP March 2022-  PSA prior         History of Present Illness:    Prostate cancer  Undetectable psa    FINAL DIAGNOSIS:   A.  Prostate, radical prostatectomy:             Adenocarcinoma of the prostate, involving less than 5% of   prostatic tissue, Curtis       score 3+4 = 7 (grade group 2), limited to the prostate, with   perineural invasion,       stage pT2 N0.    Margins negative for carcinoma.    High grade prostatic intraepithelial neoplasia. B.  Lymph nodes, bilateral pelvic, excision:             Two lymph nodes, negative for malignancy (0/2).  See microscopic description. MARTINEZ- occasional incontinence but does not wear a pad    ED-no erections at this time. Offered cialis. Had good erections prior to surgery        Requested/reviewed records from Byron Fragoso MD office and/or outside physician/EMR    (Patient's old records have been requested, reviewed and pertinent findings summarized in today's note.)    Procedures Today: N/A    Last several PSA's:  Lab Results   Component Value Date    PSA <0.02 06/08/2022    PSA 4.32 (H) 09/25/2021    PSA 3.67 (H) 04/10/2021       Last total testosterone:  No results found for: TESTOSTERONE    Urinalysis today:  No results found for this visit on 06/21/22.     Last BUN and creatinine:  Lab Results   Component Value Date    BUN 15 03/17/2022     Lab Results   Component Value Date    CREATININE 0.8 03/17/2022       Imaging Reviewed during this Office Visit:   Tess Gallego MD independently reviewed the images and verified the radiology reports from:    No results found.     PAST MEDICAL, FAMILY AND SOCIAL HISTORY:  Past Medical History:   Diagnosis Date    Cancer ([address]     prostrate    Hyperlipidemia     Hypertension     Type 1 diabetes mellitus with hypoglycemia without coma (HCC)      Past Surgical History:   Procedure Laterality Date    COLONOSCOPY  2/11/13        PROSTATECTOMY Bilateral 3/16/2022    ROBOTIC ASSISTED LAPAROSCOPIC RADICAL PROSTATECTOMY WITH BILATERAL LYMPH NODE DISSECTION performed [facility] [place]     Family History   Problem Relation Age of Onset    Cancer Sister     Diabetes Paternal Grandfather      Outpatient Medications Marked as Taking for the 6/21/22 encounter (Office Visit) with Haroon Amado MD   Medication Sig Dispense Refill    tadalafil (CIALIS) 5 MG tablet Take 1 tablet by mouth daily 30 tablet 11    losartan (COZAAR) 100 MG tablet take 1 tablet by mouth once daily 90 tablet 2    amLODIPine (NORVASC) 10 MG tablet take 1 tablet by mouth once daily 90 tablet 2    simvastatin (ZOCOR) 40 MG tablet take 1 tablet by mouth every evening 90 tablet 1    Continuous Blood Gluc Sensor (DEXCOM G6 SENSOR) MISC Change Sensor every 10 days Diagnosis:E11.9 12 each 3    Continuous Blood Gluc Transmit (DEXCOM G6 TRANSMITTER) MISC Change transmitter every 90 days Diagnosis:E11.9 3 each 3    Multiple Vitamins-Minerals (THERAPEUTIC MULTIVITAMIN-MINERALS) tablet Take 1 tablet by mouth daily      insulin aspart (NOVOLOG FLEXPEN) 100 UNIT/ML injection pen INJECT 0 TO 30 UNITS SUBCUTEANOUSLY THREE TIMES A DAY PER SLIDING SCALE. (Patient taking differently: 140-199 5 units  not eating, 15 units- does eat   200-249 8 unit  not eating, 18 units -does eat  250-299 14 not eating, 24 units- does eat   300-349- 20  not eating, 34 units- does eat   350-400 - 28 not eating, 38 units -does eat  over 400 - upto 45 units) 30 mL 5    insulin glargine (LANTUS SOLOSTAR) 100 UNIT/ML injection pen INJECT 22 UNITS INTO THE SKIN NIGHTLY 45 mL 1    hydroCHLOROthiazide (HYDRODIURIL) 25 MG tablet Take 1 tablet by mouth every morning 90 tablet 3    blood glucose monitor strips Test 1time a day & as needed for symptoms of irregular blood glucose. Dispense sufficient amount for indicated testing frequency plus additional to accommodate PRN testing needs. 100 strip 3    Continuous Blood Gluc  (DEXCOM G6 ) LUDWIN Test blood sugar 8 times daily and as needed Diagnosis:E11.9 1 Device 0    aspirin 81 MG EC tablet Take 81 mg by mouth daily           Patient has no known allergies. Social History     Tobacco Use   Smoking Status Never Smoker   Smokeless Tobacco Never Used      (If patient a smoker, smoking cessation counseling offered)   Social History     Substance and Sexual Activity   Alcohol Use Yes    Alcohol/week: 6.0 standard drinks    Types: 2 Glasses of wine, 2 Cans of beer, 2 Shots of liquor per week    Comment: occas       REVIEW OF SYSTEMS:  Constitutional: negative  Eyes: negative  Respiratory: negative  Cardiovascular: negative  Gastrointestinal: negative  Genitourinary: see HPI  Musculoskeletal: negative  Skin: negative   Neurological: negative  Hematological/Lymphatic: negative  Psychological: negative        Physical Exam:    This a 63 y.o. male  There were no vitals filed for this visit. Body mass index is 28.7 kg/m². Constitutional: Patient in no acute distress;         Assessment and Plan        1. Prostate cancer ([address]    2. MARTINEZ (stress urinary incontinence), male    3.  Erectile dysfunction, unspecified erectile dysfunction type               Plan:      Prostate cancer- psa three months  Martinez- expect cont improvement  Ed- cialis 5 mg daily for penile rehab      Prescriptions Ordered:  Orders Placed This Encounter   Medications    tadalafil (CIALIS) 5 MG tablet     Sig: Take 1 tablet by mouth daily     Dispense:  30 tablet     Refill:  11 Orders Placed:  Orders Placed This Encounter   Procedures    PSA, Prostatic Specific Antigen     Standing Status:   Future     Standing Expiration Date:   10/21/2022            Adi Spears MD

## 2022-06-22 ENCOUNTER — HOSPITAL ENCOUNTER (OUTPATIENT)
Dept: PHYSICAL THERAPY | Age: 62
Setting detail: THERAPIES SERIES
Discharge: HOME | End: 2022-06-22
Payer: COMMERCIAL

## 2022-06-22 PROCEDURE — 97530 THERAPEUTIC ACTIVITIES: CPT | Performed by: PHYSICAL THERAPIST

## 2022-06-22 NOTE — DISCHARGE SUMMARY
** PLEASE SIGN, DATE AND TIME CERTIFICATION BELOW AND RETURN TO [facility] OUTPATIENT REHABILITATION (FAX #: [phone]). ATTEST/CO-SIGN IF ACCESSING VIA [employer]Bridge. THANK YOU.**    I certify that I have examined the patient below and determined that Physical Medicine and Rehabilitation service is necessary and that I approve the established plan of care for up to 90 days or as specifically noted. Attestation, signature or co-signature of physician indicates approval of certification requirements.    ________________________ ____________ __________  Physician Signature   Date   Time    [facility]  PHYSICAL THERAPY  OUTPATIENT REHABILITATION - SPECIALIZED THERAPY SERVICES  [] [address]  [] DAILY NOTE  [] PROGRESS NOTE [x] DISCHARGE NOTE    Date: 2022  Patient Name:  Syeda Valdez  : 1960  MRN: [id number]  CSN: [id number]    Referring Practitioner Nonda Holstein, MD   Diagnosis Malignant neoplasm of prostate [C61]    Treatment Diagnosis M62.58 - Muscle Wasting and Atrophy, nec, other site  M62.89 - Other Specified Disorders of Muscle N39.3 MARTINEZ male; R 35.0 urinary frequency   Date of Evaluation 22    Additional Pertinent History HTN, Type 1 DM      Functional Outcome Measure Used IIQ and LETICIA   Functional Outcome Score 1 and 0 (22)   14 and 8 (3/30/2022)   1 and 3 (2022)      Insurance: Primary: Payor: Elijah Hoang /  /  / ,   Secondary:    Authorization Information: No pre-cert req'd   Visit # 6, 6/10 for progress note   Visits Allowed: 60 visits per calendar year   Recertification Date:    Physician Follow-Up: Sept with Dr Aby Vera   Physician Orders: eval and treat   History of Present Illness: Pt is 62 yo male who returns to PT s/p RALP which was delayed to 3/16/2022 due to weather. CA originally discovered via routine PSA f/b biopsy. Pt denies signif pre-op urinary problems.   Pt does have known enlarged prostate       SUBJECTIVE: Pt states overall since starting PT, his bladder has made very good improvements in control. Pt has not worn any protection for 1 week now, he has RTW full duty without issue, bladder is holding good volumes. Pt still strains a bit to empty completely at the end of his stream.  Pt can occas leak a very small amnt with more harsh physical demands. Pt also notes some PVD/re-start of urine stream after BM and while still on toilet. Pt did see Dr Chrissy Smith yesterday, PSA was 0.02. Pt was prescribed low dose Viagra. Spouse Jeanine Torrez present. Social/Functional History and Current Status:  Medications and Allergies have been reviewed and are listed on Medical History Questionnaire. [place] Patient lives with spouse in a single story home with basement with stairs and a handrail to enter. Task Previous Current   ADLs  Independent Independent   IADL's Independent Good bladder control with no need for protection   Ambulation Independent Good bladder control with no need for protection   Transfers Independent Good bladder control with no need for protection   Recreation Independent likes to be active with yard work and gardening   Community Integration Independent active with [employer] act's; ex's a few times per wk on TM or elliptical   Driving Active  Active    Work [employer]. Occupation: [employer]- ; no light duty options Full-Time.        PAIN-- pt denies consistent pain    Pt denies hematuria    BLADDER ASSESSMENT [] Deferred secondary to:   Daily Fluid Ingestion: 4-5 bottles water, 1 large coffee, 1c milk, occas beer   Urination Frequency Times/Day: every 2 hrs or so  Times/Night: 1-2; more freq if had beer   Volume Medium   Urge Sensation Nearly normal   SYMPTOM QUESTIONNAIRE   Loses Urine Upon: Floor transfers, physical exertion   Incontinence Volume: very small   Frequency of Leakage: Occasional   Wets the Bed: No   Burning/Pain with Urination: no   Difficulty Starting a Stream of Urine: no Incomplete Emptying must wait to void completely or slightly push at end of stream   Strain to Empty Bladder: Yes a bit at the end   Falling Out Feeling: n/a   Urinate more than 7 times/day: no   Use a form of Leakage Protection: None for 1 wk now   Restrict Fluid Intake: no   Stream Strength Average       BOWEL ASSESSMENT [] Deferred secondary to:   Frequency: Qd usually   Most Common Stool Consistency: Normal to loose   SYMPTOM QUESTIONNAIRE   Strain to have a BM: no   Include fiber in your diet: no   Take laxatives/enemas regularly: no   Pain with BM: no   Strong urge to have BM: no   Leak/Stain Feces: no   Diarrhea often: no         SEXUAL ASSESSMENT [] Deferred secondary to:   Sexually Active yes: limited due to some ED pre op   Pain with [place] (Dyspareunia) No pain reported     VITALS [] Deferred secondary to:   Height 5' 10\"   Weight 210#       GENERAL ASSESSMENT   [] Deferred secondary to:   Palpation    Observation Abdominal incisions healed   Posture WFL, pt's L LE is at least 1\" longer than R   Range of Motion Lumbar and LE AROM WFL and without c/o   Strength B LE strength WNL and without c/o; abdominal strength 2/5   Gait WNL   Sensation WNL   Edema WNL   Balance/Fall History Denies balance or fall problems   Special Tests Neg SLR, Neg Homans           OBSERVATION  [] Deferred secondary to:   Patient Safety Spouse stayed in room for exam   Skin Condition intact   Urogenital Triangle No tenderness or tightness reported       PELVIC FLOOR EXTERNAL EXAM [] Deferred secondary to:   Exam perineal body   Sensation Intact   Muscle Localization Good   Palpation/Tone Normal   Pelvic Floor Strength PERF:Power: 3,Endurance: 52,ATMM: 10,Flicks: 10   Relax after Contraction Normal       TREATMENT   Precautions:     X in shaded column indicates activity completed today   Exercise/Intervention    Notes   Basic pelvic anatomy, nature of condition, PT POC 2 min  x    Instruction on precise PFM localization 2 min  x    Post op restrictions, precautions, contraindications 2 min  x And how to progress at wk 5 post op   kegel-quick 1 sec 10 x Do one kegel option every 2 hrs in sitting or lying   kegel-hold 10 sec 10 x Add 1 sec per wk to a max of 10 sec   Tra-quick 1 sec 10 x Stand up qd for any kegel   TrA-hold 10 sec 10 x    Pelvic brace-quick 1 sec 10 x And how to use the brace during fxn'l strains and transitions while exhaling   Pelvic brace-hold 10 sec 10 x    kegel with hip IR 10 sec 10 x    kegel with hip ER 10 sec 10 x    Standing kegel with knee bend 10 sec 10     Pelvic tilt 5 sec 10 x bid   Pelvic tilt with UE  10 x    Pelvic tilt with LE  10 x    Pelvic tilt with Opp  10 x    Pelvic tilt with ball/bridge/kegel  10 x    Pelvic tilt with heel walk  10 x    Pelvic tilt with isometric 5 sec 10 x    Pelvic tilt with leg lowering  10 x    How to maintain program long term 15 min  x          Activity/Treatment Tolerance:  [x]  Patient tolerated treatment well       Patient Education:   [x]  HEP/Education Completed: Rev'd all prior instructions with pt. Progressed kegel endur to 10 sec holds and instructed standing kegel with knee bend along with fxn'l options for standing kegels. Reminded fxn'l use of the the pelvic brace during strains and transitions while exhaling. Instructed pt how to maintain his program long term. HO given along with checklist grid of home routine. [x]  Reviewed Prior HEP      [x]  Patient verbalized and/or demonstrated understanding of education provided. [x]  Barriers to learning: none    Assessment: Pt has progressed very well with PT. His bladder is holding good volumes, and his incont is quite minimal.  Pt has not worn a pad for 1 week now. Pt still has to strain slightly at the end of his urine stream but otherwise reports normal bladder fxn. Good home compliance and understanding of routine.   Body Structures/Functions/Activity Limitations: Recent RALP which resulted with urinary incontinence and lack of bladder control, PFM weakness with decreased localization and endurance, Decreased awareness of functional factors that increase pelvic organ strain, Decreased awareness of normal bladder habits, control, and diet effects on functioning, Abdominal and core weakness, Decreased awareness of safe means of improving abdom strength and stability, Impaired endurance and Impaired strength  Prognosis: Good    GOALS:  Patient Goal: have normal bladder control with no leaking    Goal:  1 visit. Pt will be independent with basic HEP for PFM strengthening and localization of PFM and to fully understand post-op precautions and POC. MET    Short term goals: 6 weeks    1. The pt will demo the ability to independently identify normal bladder function, normal voiding patterns, diet impact on the bladder, and urge control strategies to promote continence and to provide pt insight into factors causing/perpetuating symptoms, and to avoid detrimental toileting habits. MET  2. The pt will demo a basic knowledge of pelvic floor viscera and musculature in order to promote insight into condition. MET  3. The pt will demonstrate good PFM localization in order to maximize the efficacy of strengthening program.  MET  4. The pt will report transition to guard (incontinence product) to indicate functional improvement in continence. MET  5. The pt will report 40 % improvement since starting therapy to indicate efficacy of treatment. MET  6. The pt will demonstrate independence with basic HEP designed to increase PFM strength, core strength, and hip girdle flexibility for maximal therapeutic outcome. MET    Long term goals: 12 weeks  1. The pt will decrease urinary leakage by 80% since starting therapy to reduce risk of UTI, and to decrease need for incontinence products. MET   2.  The pt will establish normal voiding patterns of every 2-3 hrs daytime consistently to increase efficiency in his everyday life and to reduce pt frustration with condition. MET  3. The pt will decrease usage of incontinence care to shield for home; guard for public/work due to improved bladder control. MET  4. The pt will be independent with advanced HEP in order to maintain gains made in therapy following discharge. MET  5. IIQ and LETICIA scores improved to 4 or less to indicate functional improvement with therapy intervention. MET  6. This pt will increase strength of the pelvic floor to 3/10/10/10 to increase continence and to aid in maintaining results.   MET      PLAN:  Treatment Recommendations: Strengthening, Functional Mobility Training, Endurance Training, Neuromuscular Re-education, Manual Therapy - Soft Tissue Mobilization, Pain Management, Home Exercise Program, Patient Education and Self-Care Education and Training    [x]  Discharge    Time In 4:00   Time Out 4:55   Timed Code Minutes: 55 min   Total Treatment Time: 55 min       Electronically Signed by: JOSESITO MANUEL

## 2022-06-24 ENCOUNTER — OFFICE VISIT (OUTPATIENT)
Dept: FAMILY MEDICINE CLINIC | Age: 62
End: 2022-06-24
Payer: COMMERCIAL

## 2022-06-24 VITALS
HEART RATE: 68 BPM | RESPIRATION RATE: 20 BRPM | TEMPERATURE: 98.3 F | DIASTOLIC BLOOD PRESSURE: 66 MMHG | WEIGHT: 210 LBS | OXYGEN SATURATION: 98 % | HEIGHT: 68 IN | BODY MASS INDEX: 31.83 KG/M2 | SYSTOLIC BLOOD PRESSURE: 106 MMHG

## 2022-06-24 DIAGNOSIS — Z85.46 HISTORY OF PROSTATE CANCER: ICD-10-CM

## 2022-06-24 DIAGNOSIS — E10.649 TYPE 1 DIABETES MELLITUS WITH HYPOGLYCEMIA AND WITHOUT COMA (HCC): Primary | ICD-10-CM

## 2022-06-24 DIAGNOSIS — I10 PRIMARY HYPERTENSION: ICD-10-CM

## 2022-06-24 DIAGNOSIS — E78.5 HYPERLIPIDEMIA, UNSPECIFIED HYPERLIPIDEMIA TYPE: ICD-10-CM

## 2022-06-24 PROCEDURE — 90750 HZV VACC RECOMBINANT IM: CPT | Performed by: FAMILY MEDICINE

## 2022-06-24 PROCEDURE — 99214 OFFICE O/P EST MOD 30 MIN: CPT | Performed by: FAMILY MEDICINE

## 2022-06-24 PROCEDURE — 3044F HG A1C LEVEL LT 7.0%: CPT | Performed by: FAMILY MEDICINE

## 2022-06-24 PROCEDURE — 90471 IMMUNIZATION ADMIN: CPT | Performed by: FAMILY MEDICINE

## 2022-06-24 RX ORDER — INSULIN GLARGINE 100 [IU]/ML
INJECTION, SOLUTION SUBCUTANEOUS
Qty: 45 ML | Refills: 1 | Status: SHIPPED | OUTPATIENT
Start: 2022-06-24

## 2022-06-24 RX ORDER — HYDROCHLOROTHIAZIDE 25 MG/1
25 TABLET ORAL EVERY MORNING
Qty: 90 TABLET | Refills: 3 | Status: SHIPPED | OUTPATIENT
Start: 2022-06-24

## 2022-06-24 RX ORDER — GLUCOSAMINE HCL/CHONDROITIN SU 500-400 MG
CAPSULE ORAL
Qty: 100 STRIP | Refills: 3 | Status: SHIPPED | OUTPATIENT
Start: 2022-06-24

## 2022-06-24 ASSESSMENT — PATIENT HEALTH QUESTIONNAIRE - PHQ9
SUM OF ALL RESPONSES TO PHQ QUESTIONS 1-9: 0
2. FEELING DOWN, DEPRESSED OR HOPELESS: 0
SUM OF ALL RESPONSES TO PHQ QUESTIONS 1-9: 0
SUM OF ALL RESPONSES TO PHQ QUESTIONS 1-9: 0
SUM OF ALL RESPONSES TO PHQ9 QUESTIONS 1 & 2: 0
SUM OF ALL RESPONSES TO PHQ QUESTIONS 1-9: 0
1. LITTLE INTEREST OR PLEASURE IN DOING THINGS: 0

## 2022-06-24 NOTE — PROGRESS NOTES
Immunization(s) given during visit:    Immunizations Administered     Name Date Dose Route    Zoster Recombinant (Shingrix) 6/24/2022 0.5 mL Intramuscular    Site: Deltoid- Left    Lot: [id number]    NDC: 33079-757-82          Most recent Vaccine Information Sheet  given to pt

## 2022-06-24 NOTE — PATIENT INSTRUCTIONS
Patient Education        Recombinant Zoster (Shingles) Vaccine: What You Need to Know  Why get vaccinated? Recombinant zoster (shingles) vaccine can prevent shingles. Shingles (also called herpes zoster, or just zoster) is a painful skin rash, usually with blisters. In addition to the rash, shingles can cause fever, headache, chills, or upset stomach. Rarely, shingles can lead to complications such as pneumonia, hearing problems, blindness, brain inflammation (encephalitis), ordeath. The risk of shingles increases with age. The most common complication of shingles is long-term nerve pain called postherpetic neuralgia (PHN). PHN occurs in the areas where the shingles rash was and can last for months or years after the rash goes away. The pain from PHN can be severe anddebilitating. The risk of PHN increases with age. An older adult with shingles is more likely to develop PHN and have longer lasting and more severe pain than a youngerperson. People with weakened immune systems also have a higher risk of getting shinglesand complications from the disease. Shingles is caused by varicella-zoster virus, the same virus that causes chickenpox. After you have chickenpox, the virus stays in your body and can cause shingles later in life. Shingles cannot be passed from one person to another, but the virus that causes shingles can spread and cause chickenpox insomeone who has never had chickenpox or has never received chickenpox vaccine. Recombinant shingles vaccine  Recombinant shingles vaccine provides strong protection against shingles. By preventing shingles, recombinant shingles vaccine also protects against PHN andother complications. Recombinant shingles vaccine is recommended for:   Adults 48 years and older   Adults 19 years and older who have a weakened immune system because of disease or treatments  Shingles vaccine is given as a two-dose series.  For most people, the second dose should be given 2 to 6 months after the first dose. Some people who have or will have a weakened immune system can get the second dose 1 to 2 monthsafter the first dose. Ask your health care provider for guidance. People who have had shingles in the past and people who have received varicella (chickenpox) vaccine are recommended to get recombinant shingles vaccine. The vaccine is also recommended for people who have already gotten another type of shingles vaccine, the live shingles vaccine. There is no live virus inrecombinant shingles vaccine. Shingles vaccine may be given at the same time as other vaccines. Talk with your health care provider  Tell your vaccination provider if the person getting the vaccine:   Has had an allergic reaction after a previous dose of recombinant shingles vaccine, or has any severe, life-threatening allergies   Is currently experiencing an episode of shingles   Is pregnant  In some cases, your health care provider may decide to postpone shinglesvaccination until a future visit. People with minor illnesses, such as a cold, may be vaccinated. People who are moderately or severely ill should usually wait until they recover beforegetting recombinant shingles vaccine. Your health care provider can give you more information. Risks of a vaccine reaction   A sore arm with mild or moderate pain is very common after recombinant shingles vaccine. Redness and swelling can also happen at the site of the injection.  Tiredness, muscle pain, headache, shivering, fever, stomach pain, and nausea are common after recombinant shingles vaccine. These side effects may temporarily prevent a vaccinated person from doing regular activities. Symptoms usually go away on their own in 2 to 3 days. You should still get the second dose of recombinant shingles vaccine even if youhad one of these reactions after the first dose.   Guillain-Barré syndrome (GBS), a serious nervous system disorder, has beenreported very rarely after recombinant zoster vaccine. People sometimes faint after medical procedures, including vaccination. Tellyour provider if you feel dizzy or have vision changes or ringing in the ears. As with any medicine, there is a very remote chance of a vaccine causing asevere allergic reaction, other serious injury, or death. What if there is a serious problem? An allergic reaction could occur after the vaccinated person leaves the clinic. If you see signs of a severe allergic reaction (hives, swelling of the face and throat, difficulty breathing, a fast heartbeat, dizziness, or weakness), call 9-1-1 and get the person to the nearest hospital.  For other signs that concern you, call your health care provider. Adverse reactions should be reported to the Vaccine Adverse Event Reporting System (VAERS). Your health care provider will usually file this report, or you can do it yourself. Visit the VAERS website at www.vaers. [facility].gov or call 1-572.815.3559. VAERS is only for reporting reactions, and VAERS staff members do not give medical advice. How can I learn more?  Ask your health care provider.  Call your local or state health department.  Visit the website of the Food and Drug Administration (FDA) for vaccine package inserts and additional information at www.fda.gov/vaccines-blood-biologics/vaccines.  Contact the Centers for Disease Control and Prevention (CDC):  ? Call 7-965.700.1648 (1-800-CDC-INFO) or  ? Visit CDCs website at www.cdc.gov/vaccines. Vaccine Information Statement  Recombinant Zoster Vaccine  02/04/2022  Department of Health and Human Services  Centers for Disease Control and Prevention  Many vaccine information statements are available in Nepali and other languages. See www.immunize.org/vis  Hojas de información sobre vacunas están disponibles en español y en muchos otros idiomas. Visite Lizzette.si  Care instructions adapted under license by [facility] ([facility]).  If you have questions about a medical condition or this instruction, always ask your healthcare professional. [place] [address] any warranty or liability for your use of this information.

## 2022-06-25 ASSESSMENT — ENCOUNTER SYMPTOMS
SORE THROAT: 0
SHORTNESS OF BREATH: 0
ABDOMINAL PAIN: 0
COUGH: 0
ABDOMINAL DISTENTION: 0
RHINORRHEA: 0
SINUS PRESSURE: 0
CONSTIPATION: 0
DIARRHEA: 0
NAUSEA: 0
EYE PAIN: 0

## 2022-06-25 NOTE — PROGRESS NOTES
[address] [place] [address]  Dept: [phone]  Dept Fax: [phone]  Loc: [phone]  PROGRESS NOTE      VisitDate: 6/24/2022    Jose Segura is a 63 y.o. male who presents today for:     Chief Complaint   Patient presents with    3 Month Follow-Up     DM, Hyperlipidemia, Prostate Cancer. Due for DM foot exam    Labs Only     did not have labs done yet    Medication Refill    Immunizations     2nd Shingrix, Prevar 20         Subjective:  HPI  Patient was in follow-up type 1 diabetes. Blood sugars reportedly stable, he is not completed his blood work yet he is historically very well controlled. History of hypertension stable. History of hyperlipidemia, labs pending. Status post prostatectomy for prostate cancer recovering well. Review of Systems   Constitutional: Negative for appetite change and fever. HENT: Negative for congestion, ear pain, postnasal drip, rhinorrhea, sinus pressure and sore throat. Eyes: Negative for pain and visual disturbance. Respiratory: Negative for cough and shortness of breath. Cardiovascular: Negative for chest pain. Gastrointestinal: Negative for abdominal distention, abdominal pain, constipation, diarrhea and nausea. Genitourinary: Negative for dysuria, frequency and urgency. Musculoskeletal: Negative for arthralgias. Skin: Negative for rash. Neurological: Negative for dizziness.      Past Medical History:   Diagnosis Date    Cancer ([address]     prostrate    Hyperlipidemia     Hypertension     Type 1 diabetes mellitus with hypoglycemia without coma [facility])       Past Surgical History:   Procedure Laterality Date    COLONOSCOPY  2/11/13        PROSTATECTOMY Bilateral 3/16/2022    ROBOTIC ASSISTED LAPAROSCOPIC RADICAL PROSTATECTOMY WITH BILATERAL LYMPH NODE DISSECTION performed by Rita Garcia MD at [address] [place] [address]     Family History Problem Relation Age of Onset    Cancer Sister     Diabetes Paternal Grandfather      Social History     Tobacco Use    Smoking status: Never Smoker    Smokeless tobacco: Never Used   Substance Use Topics    Alcohol use: Yes     Alcohol/week: 6.0 standard drinks     Types: 2 Glasses of wine, 2 Cans of beer, 2 Shots of liquor per week     Comment: occas      Current Outpatient Medications   Medication Sig Dispense Refill    insulin glargine (LANTUS SOLOSTAR) 100 UNIT/ML injection pen INJECT 22 UNITS INTO THE SKIN NIGHTLY 45 mL 1    hydroCHLOROthiazide (HYDRODIURIL) 25 MG tablet Take 1 tablet by mouth every morning 90 tablet 3    blood glucose monitor strips Test 1time a day & as needed for symptoms of irregular blood glucose. Dispense sufficient amount for indicated testing frequency plus additional to accommodate PRN testing needs.  100 strip 3    tadalafil (CIALIS) 5 MG tablet Take 1 tablet by mouth daily 30 tablet 11    losartan (COZAAR) 100 MG tablet take 1 tablet by mouth once daily 90 tablet 2    amLODIPine (NORVASC) 10 MG tablet take 1 tablet by mouth once daily 90 tablet 2    simvastatin (ZOCOR) 40 MG tablet take 1 tablet by mouth every evening 90 tablet 1    Continuous Blood Gluc Sensor (DEXCOM G6 SENSOR) MISC Change Sensor every 10 days Diagnosis:E11.9 12 each 3    Continuous Blood Gluc Transmit (DEXCOM G6 TRANSMITTER) MISC Change transmitter every 90 days Diagnosis:E11.9 3 each 3    Multiple Vitamins-Minerals (THERAPEUTIC MULTIVITAMIN-MINERALS) tablet Take 1 tablet by mouth daily      insulin aspart (NOVOLOG FLEXPEN) 100 UNIT/ML injection pen INJECT 0 TO 30 UNITS SUBCUTEANOUSLY THREE TIMES A DAY PER SLIDING SCALE. (Patient taking differently: 140-199 5 units  not eating, 15 units- does eat   200-249 8 unit  not eating, 18 units -does eat  250-299 14 not eating, 24 units- does eat   300-349- 20  not eating, 34 units- does eat   350-400 - 28 not eating, 38 units -does eat  over 400 - upto 45 filament testing       /66   Pulse 68   Temp 98.3 °F (36.8 °C) (Oral)   Resp 20   Ht 5' 8.25\" (1.734 m)   Wt 210 lb (95.3 kg)   SpO2 98%   BMI 31.70 kg/m²       Impression/Plan:  1. Type 1 diabetes mellitus with hypoglycemia and without coma ([facility] [address]    2. Primary hypertension    3. Hyperlipidemia, unspecified hyperlipidemia type    4. History of prostate cancer      Requested Prescriptions     Signed Prescriptions Disp Refills    insulin glargine (LANTUS SOLOSTAR) 100 UNIT/ML injection pen 45 mL 1     Sig: INJECT 22 UNITS INTO THE SKIN NIGHTLY    hydroCHLOROthiazide (HYDRODIURIL) 25 MG tablet 90 tablet 3     Sig: Take 1 tablet by mouth every morning    blood glucose monitor strips 100 strip 3     Sig: Test 1time a day & as needed for symptoms of irregular blood glucose. Dispense sufficient amount for indicated testing frequency plus additional to accommodate PRN testing needs. Orders Placed This Encounter   Procedures    [facility], [address] [place], (18 yrs +), IM    Microalbumin / Creatinine Urine Ratio     Standing Status:   Future     Standing Expiration Date:   6/24/2023    Lipid Panel     Standing Status:   Future     Standing Expiration Date:   6/24/2023     Order Specific Question:   Is Patient Fasting?/# of Hours     Answer:   yes/12    Comprehensive Metabolic Panel     Standing Status:   Future     Standing Expiration Date:   6/24/2023    Hemoglobin A1C     Standing Status:   Future     Standing Expiration Date:   6/24/2023     Follow-up based on lab results  Patient giveneducational materials - see patient instructions. Discussed use, benefit, and side effects of prescribed medications. All patient questions answered. Pt voiced understanding. Reviewed health maintenance. Patient agreedwith treatment plan. Follow up as directed. **This report has been created using voice recognition software. It may contain minor errorswhich are inherent in voice recognition technology. ** Electronically signed by Asim Tyson MD on 6/25/2022 at 6:43 AM

## 2022-06-27 ENCOUNTER — HOSPITAL ENCOUNTER (OUTPATIENT)
Age: 62
Discharge: HOME | End: 2022-06-27
Payer: COMMERCIAL

## 2022-06-27 DIAGNOSIS — E10.649 TYPE 1 DIABETES MELLITUS WITH HYPOGLYCEMIA AND WITHOUT COMA (HCC): ICD-10-CM

## 2022-06-27 LAB
AVERAGE GLUCOSE: 123 MG/DL (ref 70–126)
HBA1C MFR BLD: 6.1 % (ref 4.4–6.4)

## 2022-06-27 PROCEDURE — 80053 COMPREHEN METABOLIC PANEL: CPT

## 2022-06-27 PROCEDURE — 83036 HEMOGLOBIN GLYCOSYLATED A1C: CPT

## 2022-06-27 PROCEDURE — 80061 LIPID PANEL: CPT

## 2022-06-27 PROCEDURE — 36415 COLL VENOUS BLD VENIPUNCTURE: CPT

## 2022-06-27 PROCEDURE — 82043 UR ALBUMIN QUANTITATIVE: CPT

## 2022-06-28 LAB
ALBUMIN SERPL-MCNC: 4.7 G/DL (ref 3.5–5.1)
ALP BLD-CCNC: 77 U/L (ref 38–126)
ALT SERPL-CCNC: 7 U/L (ref 11–66)
ANION GAP SERPL CALCULATED.3IONS-SCNC: 16 MEQ/L (ref 8–16)
AST SERPL-CCNC: 18 U/L (ref 5–40)
BILIRUB SERPL-MCNC: 1.2 MG/DL (ref 0.3–1.2)
BUN BLDV-MCNC: 14 MG/DL (ref 7–22)
CALCIUM SERPL-MCNC: 9.3 MG/DL (ref 8.5–10.5)
CHLORIDE BLD-SCNC: 100 MEQ/L (ref 98–111)
CHOLESTEROL, TOTAL: 151 MG/DL (ref 100–199)
CO2: 23 MEQ/L (ref 23–33)
CREAT SERPL-MCNC: 0.7 MG/DL (ref 0.4–1.2)
CREATININE, URINE: 202.6 MG/DL
GFR SERPL CREATININE-BSD FRML MDRD: > 90 ML/MIN/1.73M2
GLUCOSE BLD-MCNC: 140 MG/DL (ref 70–108)
HDLC SERPL-MCNC: 56 MG/DL
LDL CHOLESTEROL CALCULATED: 79 MG/DL
MICROALBUMIN UR-MCNC: < 1.2 MG/DL
MICROALBUMIN/CREAT UR-RTO: 6 MG/G (ref 0–30)
POTASSIUM SERPL-SCNC: 4.3 MEQ/L (ref 3.5–5.2)
SODIUM BLD-SCNC: 139 MEQ/L (ref 135–145)
TOTAL PROTEIN: 6.3 G/DL (ref 6.1–8)
TRIGL SERPL-MCNC: 81 MG/DL (ref 0–199)

## 2022-07-05 RX ORDER — INSULIN ASPART 100 [IU]/ML
INJECTION, SOLUTION INTRAVENOUS; SUBCUTANEOUS
Qty: 30 ML | Refills: 5 | Status: SHIPPED | OUTPATIENT
Start: 2022-07-05

## 2022-07-07 ENCOUNTER — TELEPHONE (OUTPATIENT)
Dept: FAMILY MEDICINE CLINIC | Age: 62
End: 2022-07-07

## 2022-09-15 ENCOUNTER — HOSPITAL ENCOUNTER (OUTPATIENT)
Age: 62
Discharge: HOME | End: 2022-09-15
Payer: COMMERCIAL

## 2022-09-15 DIAGNOSIS — C61 PROSTATE CANCER (HCC): ICD-10-CM

## 2022-09-15 PROCEDURE — 36415 COLL VENOUS BLD VENIPUNCTURE: CPT

## 2022-09-15 PROCEDURE — 84153 ASSAY OF PSA TOTAL: CPT

## 2022-09-16 LAB — PROSTATE SPECIFIC ANTIGEN: < 0.02 NG/ML (ref 0–1)

## 2022-09-20 ENCOUNTER — OFFICE VISIT (OUTPATIENT)
Dept: UROLOGY | Age: 62
End: 2022-09-20
Payer: COMMERCIAL

## 2022-09-20 VITALS
DIASTOLIC BLOOD PRESSURE: 72 MMHG | WEIGHT: 210 LBS | SYSTOLIC BLOOD PRESSURE: 118 MMHG | HEIGHT: 70 IN | BODY MASS INDEX: 30.06 KG/M2

## 2022-09-20 DIAGNOSIS — C61 PROSTATE CANCER (HCC): Primary | ICD-10-CM

## 2022-09-20 DIAGNOSIS — N39.3 SUI (STRESS URINARY INCONTINENCE), MALE: ICD-10-CM

## 2022-09-20 DIAGNOSIS — N52.9 ERECTILE DYSFUNCTION, UNSPECIFIED ERECTILE DYSFUNCTION TYPE: ICD-10-CM

## 2022-09-20 PROCEDURE — 99214 OFFICE O/P EST MOD 30 MIN: CPT | Performed by: UROLOGY

## 2022-09-20 RX ORDER — TADALAFIL 10 MG/1
10 TABLET ORAL
Qty: 15 TABLET | Refills: 3 | Status: SHIPPED | OUTPATIENT
Start: 2022-09-20 | End: 2022-10-20

## 2022-09-20 NOTE — PROGRESS NOTES
Jorge Alberto Lagunas [address] [place] [id number]  Dept: [phone]  Dept Fax: [phone]  Loc: [address] [facility] Urology Office Note -     Patient:  Chris Olivas  YOB: 1960    The patient is a 63 y.o. male who presents today for evaluation of the following problems:   Chief Complaint   Patient presents with    Follow-up    Prostate Cancer     PSA PRIOR         History of Present Illness:    Prostate cancer  Hx of prostatectomy  Undetectable psa    FINAL DIAGNOSIS:   A.  Prostate, radical prostatectomy:             Adenocarcinoma of the prostate, involving less than 5% of   prostatic tissue, Curtis       score 3+4 = 7 (grade group 2), limited to the prostate, with   perineural invasion,       stage pT2 N0. Margins negative for carcinoma. High grade prostatic intraepithelial neoplasia. B.  Lymph nodes, bilateral pelvic, excision:             Two lymph nodes, negative for malignancy (0/2). See microscopic description. MARTINEZ- no pads. ED-no erections at this time. On cialis. Had good erections prior to surgery        Requested/reviewed records from Mark Parra MD office and/or outside physician/EMR    (Patient's old records have been requested, reviewed and pertinent findings summarized in today's note.)    Procedures Today: N/A    Last several PSA's:  Lab Results   Component Value Date    PSA <0.02 09/15/2022    PSA <0.02 06/08/2022    PSA 4.32 (H) 09/25/2021       Last total testosterone:  No results found for: TESTOSTERONE    Urinalysis today:  No results found for this visit on 09/20/22.     Last BUN and creatinine:  Lab Results   Component Value Date    BUN 14 06/27/2022     Lab Results   Component Value Date    CREATININE 0.7 06/27/2022       Imaging Reviewed during this Office Visit:   Christal Gresham MD independently reviewed the images and verified the radiology reports from:    No results found. PAST MEDICAL, FAMILY AND SOCIAL HISTORY:  Past Medical History:   Diagnosis Date    Cancer ([facility] [address]     prostrate    Hyperlipidemia     Hypertension     Type 1 diabetes mellitus with hypoglycemia without coma [facility])      Past Surgical History:   Procedure Laterality Date    COLONOSCOPY  2/11/13        PROSTATECTOMY Bilateral 3/16/2022    ROBOTIC ASSISTED LAPAROSCOPIC RADICAL PROSTATECTOMY WITH BILATERAL LYMPH NODE DISSECTION performed by Eddie Goss MD at [facility] 1     Family History   Problem Relation Age of Onset    Cancer Sister     Diabetes Paternal Grandfather      Outpatient Medications Marked as Taking for the 9/20/22 encounter (Office Visit) with Eddie Goss MD   Medication Sig Dispense Refill    insulin aspart (NOVOLOG FLEXPEN) 100 UNIT/ML injection pen INJECT 0 TO 30 UNITS SUBCUTANEOUSLY THREE TIMES A DAY PER SLIDING SCALE. 30 mL 5    insulin glargine (LANTUS SOLOSTAR) 100 UNIT/ML injection pen INJECT 22 UNITS INTO THE SKIN NIGHTLY 45 mL 1    hydroCHLOROthiazide (HYDRODIURIL) 25 MG tablet Take 1 tablet by mouth every morning 90 tablet 3    blood glucose monitor strips Test 1time a day & as needed for symptoms of irregular blood glucose. Dispense sufficient amount for indicated testing frequency plus additional to accommodate PRN testing needs.  100 strip 3    tadalafil (CIALIS) 5 MG tablet Take 1 tablet by mouth daily 30 tablet 11    losartan (COZAAR) 100 MG tablet take 1 tablet by mouth once daily 90 tablet 2    amLODIPine (NORVASC) 10 MG tablet take 1 tablet by mouth once daily 90 tablet 2    simvastatin (ZOCOR) 40 MG tablet take 1 tablet by mouth every evening 90 tablet 1    Continuous Blood Gluc Sensor (DEXCOM G6 SENSOR) MISC Change Sensor every 10 days Diagnosis:E11.9 12 each 3    Continuous Blood Gluc Transmit (DEXCOM G6 TRANSMITTER) MISC Change transmitter every 90 days Diagnosis:E11.9 3 each 3    Multiple Vitamins-Minerals (THERAPEUTIC MULTIVITAMIN-MINERALS) tablet Take 1 tablet by mouth daily      Continuous Blood Gluc  (DEXCOM G6 ) LUDWIN Test blood sugar 8 times daily and as needed Diagnosis:E11.9 1 Device 0    aspirin 81 MG EC tablet Take 81 mg by mouth daily           Patient has no known allergies. Social History     Tobacco Use   Smoking Status Never   Smokeless Tobacco Never      (If patient a smoker, smoking cessation counseling offered)   Social History     Substance and Sexual Activity   Alcohol Use Yes    Alcohol/week: 6.0 standard drinks    Types: 2 Glasses of wine, 2 Cans of beer, 2 Shots of liquor per week    Comment: occas       REVIEW OF SYSTEMS:  Constitutional: negative  Eyes: negative  Respiratory: negative  Cardiovascular: negative  Gastrointestinal: negative  Genitourinary: see HPI  Musculoskeletal: negative  Skin: negative   Neurological: negative  Hematological/Lymphatic: negative  Psychological: negative        Physical Exam:    This a 63 y.o. male  Vitals:    09/20/22 0904   BP: 118/72     Body mass index is 30.13 kg/m². Constitutional: Patient in no acute distress;         Assessment and Plan        1. Prostate cancer ([facility] [address]    2. Erectile dysfunction, unspecified erectile dysfunction type    3. MARTINEZ (stress urinary incontinence), male               Plan:      Prostate cancer- psa six months. Negative margins on prostatectomy  Martinez- off pads  Ed- cialis 5 mg daily for penile rehab not helping. No erections. Discussed trimix. Will discuss with wife. In meantime we will increase cialis to 10 mg m,w,f      Prescriptions Ordered:  No orders of the defined types were placed in this encounter. Orders Placed:  No orders of the defined types were placed in this encounter.            Yani Mccullough MD

## 2022-10-25 DIAGNOSIS — E10.649 TYPE 1 DIABETES MELLITUS WITH HYPOGLYCEMIA AND WITHOUT COMA (HCC): ICD-10-CM

## 2022-10-26 RX ORDER — SIMVASTATIN 40 MG
TABLET ORAL
Qty: 90 TABLET | Refills: 1 | Status: SHIPPED | OUTPATIENT
Start: 2022-10-26

## 2023-01-23 DIAGNOSIS — E10.649 TYPE 1 DIABETES MELLITUS WITH HYPOGLYCEMIA AND WITHOUT COMA (HCC): ICD-10-CM

## 2023-01-23 RX ORDER — LOSARTAN POTASSIUM 100 MG/1
TABLET ORAL
Qty: 90 TABLET | Refills: 2 | Status: SHIPPED | OUTPATIENT
Start: 2023-01-23

## 2023-01-23 RX ORDER — SIMVASTATIN 40 MG
TABLET ORAL
Qty: 90 TABLET | Refills: 1 | Status: SHIPPED | OUTPATIENT
Start: 2023-01-23

## 2023-03-08 ENCOUNTER — HOSPITAL ENCOUNTER (OUTPATIENT)
Age: 63
Discharge: HOME | End: 2023-03-08
Payer: COMMERCIAL

## 2023-03-08 DIAGNOSIS — C61 PROSTATE CANCER (HCC): ICD-10-CM

## 2023-03-08 LAB — PSA SERPL-MCNC: < 0.02 NG/ML (ref 0–1)

## 2023-03-08 PROCEDURE — 84153 ASSAY OF PSA TOTAL: CPT

## 2023-03-08 PROCEDURE — 36415 COLL VENOUS BLD VENIPUNCTURE: CPT

## 2023-03-13 RX ORDER — AMLODIPINE BESYLATE 10 MG/1
TABLET ORAL
Qty: 90 TABLET | Refills: 2 | Status: SHIPPED | OUTPATIENT
Start: 2023-03-13

## 2023-03-13 RX ORDER — INSULIN ASPART 100 [IU]/ML
INJECTION, SOLUTION INTRAVENOUS; SUBCUTANEOUS
Qty: 30 ML | Refills: 2 | Status: SHIPPED | OUTPATIENT
Start: 2023-03-13

## 2023-03-21 ENCOUNTER — OFFICE VISIT (OUTPATIENT)
Dept: UROLOGY | Age: 63
End: 2023-03-21
Payer: COMMERCIAL

## 2023-03-21 VITALS — RESPIRATION RATE: 18 BRPM | WEIGHT: 214 LBS | HEIGHT: 70 IN | BODY MASS INDEX: 30.64 KG/M2

## 2023-03-21 DIAGNOSIS — N39.3 SUI (STRESS URINARY INCONTINENCE), MALE: ICD-10-CM

## 2023-03-21 DIAGNOSIS — N52.9 ERECTILE DYSFUNCTION, UNSPECIFIED ERECTILE DYSFUNCTION TYPE: ICD-10-CM

## 2023-03-21 DIAGNOSIS — C61 PROSTATE CANCER (HCC): Primary | ICD-10-CM

## 2023-03-21 PROCEDURE — 99214 OFFICE O/P EST MOD 30 MIN: CPT | Performed by: UROLOGY

## 2023-03-21 NOTE — PROGRESS NOTES
Jorge Alberto Lagunas [facility] [phone]  Dept: [phone]  Dept Fax: [phone]  Loc: [address] [facility] Urology Office Note -     Patient:  Gifty Davidson  YOB: 1960    The patient is a 63 y.o. male who presents today for evaluation of the following problems:   Chief Complaint   Patient presents with    Prostate Cancer     PSA <0.02          History of Present Illness:    Prostate cancer  Hx of prostatectomy  Undetectable psa    FINAL DIAGNOSIS:   A.  Prostate, radical prostatectomy:             Adenocarcinoma of the prostate, involving less than 5% of   prostatic tissue, [place]       score 3+4 = 7 (grade group 2), limited to the prostate, with   perineural invasion,       stage pT2 N0. Margins negative for carcinoma. High grade prostatic intraepithelial neoplasia. B.  Lymph nodes, bilateral pelvic, excision:             Two lymph nodes, negative for malignancy (0/2). See microscopic description. MARTINEZ- no pads. ED-no erections at this time. On cialis. Had good erections prior to surgery        Requested/reviewed records from Danica Godoy MD office and/or outside physician/EMR    (Patient's old records have been requested, reviewed and pertinent findings summarized in today's note.)    Procedures Today: N/A    Last several PSA's:  Lab Results   Component Value Date    PSA <0.02 03/08/2023    PSA <0.02 09/15/2022    PSA <0.02 06/08/2022       Last total testosterone:  No results found for: TESTOSTERONE    Urinalysis today:  No results found for this visit on 03/21/23.     Last BUN and creatinine:  Lab Results   Component Value Date    BUN 14 06/27/2022     Lab Results   Component Value Date    CREATININE 0.7 06/27/2022       Imaging Reviewed during this Office Visit:   Srinivasa Thomas MD independently reviewed the images and verified the radiology reports from:    No results

## 2023-04-18 ENCOUNTER — OFFICE VISIT (OUTPATIENT)
Dept: UROLOGY | Age: 63
End: 2023-04-18
Payer: COMMERCIAL

## 2023-04-18 VITALS
DIASTOLIC BLOOD PRESSURE: 88 MMHG | HEIGHT: 70 IN | BODY MASS INDEX: 30.64 KG/M2 | SYSTOLIC BLOOD PRESSURE: 152 MMHG | WEIGHT: 214 LBS

## 2023-04-18 DIAGNOSIS — N39.3 SUI (STRESS URINARY INCONTINENCE), MALE: ICD-10-CM

## 2023-04-18 DIAGNOSIS — N52.9 ERECTILE DYSFUNCTION, UNSPECIFIED ERECTILE DYSFUNCTION TYPE: Primary | ICD-10-CM

## 2023-04-18 DIAGNOSIS — C61 PROSTATE CANCER (HCC): ICD-10-CM

## 2023-04-18 PROCEDURE — 3077F SYST BP >= 140 MM HG: CPT

## 2023-04-18 PROCEDURE — 3079F DIAST BP 80-89 MM HG: CPT

## 2023-04-18 PROCEDURE — 99214 OFFICE O/P EST MOD 30 MIN: CPT

## 2023-04-18 NOTE — PATIENT INSTRUCTIONS
Starting dose of 0.2 mL (20 daryl on insulin syringe). If happy with result then stay at 0.2 mL, if firmer erection desired then increase at 0.05 mL increments (25 daryl, 30 daryl, and so on) with an absolute maximum of 0.5 mL. *Goal is lowest dose to achieve optimal benefit.

## 2023-04-18 NOTE — PROGRESS NOTES
[address] [address]  Dept: [phone]  Loc: [phone]  Visit Date: 4/18/2023    KARISSA Jaramillo is a 63 y.o. male that presents to the urology clinic for Trimix teaching. Patient with personal history of prostate cancer for which he required prostatectomy in March of 2022. He admits to difficulty achieving erections following surgery for which he was tried on Cialis. No benefit with oral medications. Prior to surgery Markus Jacinto states he was able to achieve and maintain erections without difficulty. ED is very bothersome to him. Markus Jacinto presents with his wife for Trimix injection trial and teaching. The patient will be performing the injections himself. Prostate Cancer  Patient has done well post-operatively. PSA has remained undetectable. MARTINEZ- Has not required pads. Doing well with continence. PSA Trend:   <0.02 (3/8/23)  <0.02 (9/15/22)  <0.02 (6/8/22)  4.32 (9/25/21) *Prior to Prostatectomy    Pathology Report  FINAL DIAGNOSIS  A. Prostate, Radical Prostatectomy:   - Adenocarcinoma of the prostate, involving less than 5% of prostatic tissue.  - Curtis Score 3+4 = 7 (Grade Group 2), limited to the prostate, with perineural invasion.  - Stage pT2 N0.   - Margins negative for carcinoma. - High grade prostatic intraepithelial neoplasia. B.  Lymph nodes, bilateral pelvic, excision:             Two lymph nodes, negative for malignancy (0/2).        Last BUN and creatinine:  Lab Results   Component Value Date    BUN 14 06/27/2022     Lab Results   Component Value Date    CREATININE 0.7 06/27/2022           PAST MEDICAL, FAMILY AND SOCIAL HISTORY UPDATE:  Past Medical History:   Diagnosis Date    Cancer ([facility] [address]     prostrate    Hyperlipidemia     Hypertension     Type 1 diabetes mellitus with hypoglycemia without coma [facility])      Past Surgical History:   Procedure Laterality Date    COLONOSCOPY

## 2023-04-21 DIAGNOSIS — E11.9 CONTROLLED TYPE 2 DIABETES MELLITUS WITHOUT COMPLICATION, WITH LONG-TERM CURRENT USE OF INSULIN (HCC): ICD-10-CM

## 2023-04-21 DIAGNOSIS — Z79.4 CONTROLLED TYPE 2 DIABETES MELLITUS WITHOUT COMPLICATION, WITH LONG-TERM CURRENT USE OF INSULIN (HCC): ICD-10-CM

## 2023-04-22 DIAGNOSIS — E11.9 CONTROLLED TYPE 2 DIABETES MELLITUS WITHOUT COMPLICATION, WITH LONG-TERM CURRENT USE OF INSULIN (HCC): ICD-10-CM

## 2023-04-22 DIAGNOSIS — Z79.4 CONTROLLED TYPE 2 DIABETES MELLITUS WITHOUT COMPLICATION, WITH LONG-TERM CURRENT USE OF INSULIN (HCC): ICD-10-CM

## 2023-04-24 RX ORDER — PROCHLORPERAZINE 25 MG/1
SUPPOSITORY RECTAL
Qty: 12 EACH | Refills: 0 | Status: SHIPPED | OUTPATIENT
Start: 2023-04-24

## 2023-04-24 RX ORDER — PROCHLORPERAZINE 25 MG/1
SUPPOSITORY RECTAL
Qty: 3 EACH | Refills: 0 | Status: SHIPPED | OUTPATIENT
Start: 2023-04-24

## 2023-07-03 ENCOUNTER — TELEPHONE (OUTPATIENT)
Dept: FAMILY MEDICINE CLINIC | Age: 63
End: 2023-07-03

## 2023-07-03 DIAGNOSIS — I10 PRIMARY HYPERTENSION: ICD-10-CM

## 2023-07-03 DIAGNOSIS — E78.5 HYPERLIPIDEMIA, UNSPECIFIED HYPERLIPIDEMIA TYPE: ICD-10-CM

## 2023-07-03 DIAGNOSIS — E10.649 TYPE 1 DIABETES MELLITUS WITH HYPOGLYCEMIA AND WITHOUT COMA (HCC): Primary | ICD-10-CM

## 2023-07-03 DIAGNOSIS — I10 ESSENTIAL HYPERTENSION: ICD-10-CM

## 2023-07-03 NOTE — TELEPHONE ENCOUNTER
Has appt 7-11-23 with Dr Evonne Mai. Last labs were 6-27-22. Had A1c, CMP, Lipid, Microalbumin. Order same? PSA done thru urology?

## 2023-07-03 NOTE — TELEPHONE ENCOUNTER
----- Message from [employer] sent at 7/3/2023 10:31 AM EDT -----  Subject: Message to Provider    QUESTIONS  Information for Provider? Patient is calling to see if Dr. Tj Dao wants   bloodwork done before his appt. on the 11th. please advise and call back   ---------------------------------------------------------------------------  --------------  Elizabeth Decker INFO  [phone]; OK to leave message on voicemail  ---------------------------------------------------------------------------  --------------  SCRIPT ANSWERS  Relationship to Patient?  Self

## 2023-07-03 NOTE — TELEPHONE ENCOUNTER
Please order the respective labs listed and forward the order to Dr. Jaime Starks to sign, so they result back to him.

## 2023-07-06 ENCOUNTER — HOSPITAL ENCOUNTER (OUTPATIENT)
Age: 63
Discharge: HOME | End: 2023-07-06
Payer: COMMERCIAL

## 2023-07-06 DIAGNOSIS — E10.649 TYPE 1 DIABETES MELLITUS WITH HYPOGLYCEMIA AND WITHOUT COMA (HCC): ICD-10-CM

## 2023-07-06 DIAGNOSIS — I10 PRIMARY HYPERTENSION: ICD-10-CM

## 2023-07-06 DIAGNOSIS — C61 PROSTATE CANCER (HCC): ICD-10-CM

## 2023-07-06 DIAGNOSIS — E78.5 HYPERLIPIDEMIA, UNSPECIFIED HYPERLIPIDEMIA TYPE: ICD-10-CM

## 2023-07-06 LAB
CREAT UR-MCNC: 110.1 MG/DL
MICROALBUMIN UR-MCNC: < 1.2 MG/DL
MICROALBUMIN/CREAT RATIO PNL UR: 11 MG/G (ref 0–30)

## 2023-07-06 PROCEDURE — 83036 HEMOGLOBIN GLYCOSYLATED A1C: CPT

## 2023-07-06 PROCEDURE — 84153 ASSAY OF PSA TOTAL: CPT

## 2023-07-06 PROCEDURE — 36415 COLL VENOUS BLD VENIPUNCTURE: CPT

## 2023-07-06 PROCEDURE — 80061 LIPID PANEL: CPT

## 2023-07-06 PROCEDURE — 80053 COMPREHEN METABOLIC PANEL: CPT

## 2023-07-06 PROCEDURE — 82043 UR ALBUMIN QUANTITATIVE: CPT

## 2023-07-07 LAB
ALBUMIN SERPL BCG-MCNC: 4.7 G/DL (ref 3.5–5.1)
ALP SERPL-CCNC: 73 U/L (ref 38–126)
ALT SERPL W/O P-5'-P-CCNC: 9 U/L (ref 11–66)
ANION GAP SERPL CALC-SCNC: 15 MEQ/L (ref 8–16)
AST SERPL-CCNC: 21 U/L (ref 5–40)
BILIRUB SERPL-MCNC: 1.7 MG/DL (ref 0.3–1.2)
BUN SERPL-MCNC: 17 MG/DL (ref 7–22)
CALCIUM SERPL-MCNC: 9.5 MG/DL (ref 8.5–10.5)
CHLORIDE SERPL-SCNC: 99 MEQ/L (ref 98–111)
CHOLEST SERPL-MCNC: 167 MG/DL (ref 100–199)
CO2 SERPL-SCNC: 24 MEQ/L (ref 23–33)
CREAT SERPL-MCNC: 0.8 MG/DL (ref 0.4–1.2)
DEPRECATED MEAN GLUCOSE BLD GHB EST-ACNC: 132 MG/DL (ref 70–126)
GFR SERPL CREATININE-BSD FRML MDRD: > 60 ML/MIN/1.73M2
GLUCOSE SERPL-MCNC: 115 MG/DL (ref 70–108)
HBA1C MFR BLD HPLC: 6.4 % (ref 4.4–6.4)
HDLC SERPL-MCNC: 64 MG/DL
LDLC SERPL CALC-MCNC: 88 MG/DL
POTASSIUM SERPL-SCNC: 3.8 MEQ/L (ref 3.5–5.2)
PROT SERPL-MCNC: 6.8 G/DL (ref 6.1–8)
PSA SERPL-MCNC: < 0.02 NG/ML (ref 0–1)
SODIUM SERPL-SCNC: 138 MEQ/L (ref 135–145)
TRIGL SERPL-MCNC: 75 MG/DL (ref 0–199)

## 2023-07-11 ENCOUNTER — OFFICE VISIT (OUTPATIENT)
Dept: FAMILY MEDICINE CLINIC | Age: 63
End: 2023-07-11
Payer: COMMERCIAL

## 2023-07-11 VITALS
WEIGHT: 207 LBS | BODY MASS INDEX: 30.66 KG/M2 | OXYGEN SATURATION: 95 % | SYSTOLIC BLOOD PRESSURE: 108 MMHG | RESPIRATION RATE: 16 BRPM | TEMPERATURE: 98 F | HEART RATE: 72 BPM | DIASTOLIC BLOOD PRESSURE: 66 MMHG | HEIGHT: 69 IN

## 2023-07-11 DIAGNOSIS — E10.649 TYPE 1 DIABETES MELLITUS WITH HYPOGLYCEMIA AND WITHOUT COMA (HCC): ICD-10-CM

## 2023-07-11 DIAGNOSIS — Z00.00 ROUTINE GENERAL MEDICAL EXAMINATION AT A HEALTH CARE FACILITY: Primary | ICD-10-CM

## 2023-07-11 DIAGNOSIS — I10 PRIMARY HYPERTENSION: Chronic | ICD-10-CM

## 2023-07-11 DIAGNOSIS — C61 PROSTATE CANCER (HCC): ICD-10-CM

## 2023-07-11 DIAGNOSIS — Z12.11 SCREEN FOR COLON CANCER: ICD-10-CM

## 2023-07-11 DIAGNOSIS — E78.5 HYPERLIPIDEMIA, UNSPECIFIED HYPERLIPIDEMIA TYPE: Chronic | ICD-10-CM

## 2023-07-11 PROCEDURE — 3074F SYST BP LT 130 MM HG: CPT | Performed by: FAMILY MEDICINE

## 2023-07-11 PROCEDURE — 3078F DIAST BP <80 MM HG: CPT | Performed by: FAMILY MEDICINE

## 2023-07-11 PROCEDURE — 99396 PREV VISIT EST AGE 40-64: CPT | Performed by: FAMILY MEDICINE

## 2023-07-11 RX ORDER — TADALAFIL 5 MG/1
5 TABLET ORAL DAILY
Qty: 30 TABLET | Refills: 11 | Status: SHIPPED | OUTPATIENT
Start: 2023-07-11

## 2023-07-11 RX ORDER — LOSARTAN POTASSIUM 100 MG/1
100 TABLET ORAL DAILY
Qty: 90 TABLET | Refills: 2 | Status: SHIPPED | OUTPATIENT
Start: 2023-07-11

## 2023-07-11 RX ORDER — PROCHLORPERAZINE 25 MG/1
SUPPOSITORY RECTAL
Qty: 3 EACH | Refills: 11 | Status: SHIPPED | OUTPATIENT
Start: 2023-07-11

## 2023-07-11 RX ORDER — INSULIN GLARGINE 100 [IU]/ML
INJECTION, SOLUTION SUBCUTANEOUS
Qty: 45 ML | Refills: 1 | Status: SHIPPED | OUTPATIENT
Start: 2023-07-11

## 2023-07-11 RX ORDER — SIMVASTATIN 40 MG
40 TABLET ORAL NIGHTLY
Qty: 90 TABLET | Refills: 1 | Status: SHIPPED | OUTPATIENT
Start: 2023-07-11

## 2023-07-11 RX ORDER — GLUCOSAMINE HCL/CHONDROITIN SU 500-400 MG
CAPSULE ORAL
Qty: 100 STRIP | Refills: 3 | Status: SHIPPED | OUTPATIENT
Start: 2023-07-11

## 2023-07-11 RX ORDER — INSULIN ASPART 100 [IU]/ML
INJECTION, SOLUTION INTRAVENOUS; SUBCUTANEOUS
Qty: 30 ML | Refills: 2 | Status: SHIPPED | OUTPATIENT
Start: 2023-07-11

## 2023-07-11 RX ORDER — PROCHLORPERAZINE 25 MG/1
SUPPOSITORY RECTAL
Qty: 1 EACH | Refills: 3 | Status: SHIPPED | OUTPATIENT
Start: 2023-07-11

## 2023-07-11 SDOH — ECONOMIC STABILITY: INCOME INSECURITY: HOW HARD IS IT FOR YOU TO PAY FOR THE VERY BASICS LIKE FOOD, HOUSING, MEDICAL CARE, AND HEATING?: NOT HARD AT ALL

## 2023-07-11 SDOH — ECONOMIC STABILITY: FOOD INSECURITY: WITHIN THE PAST 12 MONTHS, THE FOOD YOU BOUGHT JUST DIDN'T LAST AND YOU DIDN'T HAVE MONEY TO GET MORE.: NEVER TRUE

## 2023-07-11 SDOH — ECONOMIC STABILITY: FOOD INSECURITY: WITHIN THE PAST 12 MONTHS, YOU WORRIED THAT YOUR FOOD WOULD RUN OUT BEFORE YOU GOT MONEY TO BUY MORE.: NEVER TRUE

## 2023-07-11 SDOH — ECONOMIC STABILITY: HOUSING INSECURITY
IN THE LAST 12 MONTHS, WAS THERE A TIME WHEN YOU DID NOT HAVE A STEADY PLACE TO SLEEP OR SLEPT IN A SHELTER (INCLUDING NOW)?: NO

## 2023-07-11 ASSESSMENT — PATIENT HEALTH QUESTIONNAIRE - PHQ9
SUM OF ALL RESPONSES TO PHQ QUESTIONS 1-9: 0
SUM OF ALL RESPONSES TO PHQ9 QUESTIONS 1 & 2: 0
SUM OF ALL RESPONSES TO PHQ QUESTIONS 1-9: 0
2. FEELING DOWN, DEPRESSED OR HOPELESS: 0
1. LITTLE INTEREST OR PLEASURE IN DOING THINGS: 0
SUM OF ALL RESPONSES TO PHQ QUESTIONS 1-9: 0
SUM OF ALL RESPONSES TO PHQ QUESTIONS 1-9: 0

## 2023-07-16 ASSESSMENT — ENCOUNTER SYMPTOMS
ABDOMINAL DISTENTION: 0
EYE PAIN: 0
NAUSEA: 0
RHINORRHEA: 0
SORE THROAT: 0
COUGH: 0
SHORTNESS OF BREATH: 0
SINUS PRESSURE: 0
DIARRHEA: 0
CONSTIPATION: 0
ABDOMINAL PAIN: 0

## 2023-08-29 RX ORDER — HYDROCHLOROTHIAZIDE 25 MG/1
25 TABLET ORAL EVERY MORNING
Qty: 90 TABLET | Refills: 3 | Status: SHIPPED | OUTPATIENT
Start: 2023-08-29

## 2023-09-08 ENCOUNTER — HOSPITAL ENCOUNTER (OUTPATIENT)
Age: 63
Discharge: HOME | End: 2023-09-08
Payer: COMMERCIAL

## 2023-09-08 LAB — PSA SERPL-MCNC: < 0.02 NG/ML (ref 0–1)

## 2023-09-08 PROCEDURE — 36415 COLL VENOUS BLD VENIPUNCTURE: CPT

## 2023-09-08 PROCEDURE — G0103 PSA SCREENING: HCPCS

## 2023-09-19 ENCOUNTER — OFFICE VISIT (OUTPATIENT)
Dept: UROLOGY | Age: 63
End: 2023-09-19
Payer: COMMERCIAL

## 2023-09-19 VITALS — BODY MASS INDEX: 30.66 KG/M2 | RESPIRATION RATE: 16 BRPM | HEIGHT: 69 IN | WEIGHT: 207 LBS

## 2023-09-19 DIAGNOSIS — C61 PROSTATE CANCER (HCC): Primary | ICD-10-CM

## 2023-09-19 DIAGNOSIS — N39.3 SUI (STRESS URINARY INCONTINENCE), MALE: ICD-10-CM

## 2023-09-19 DIAGNOSIS — N52.9 ERECTILE DYSFUNCTION, UNSPECIFIED ERECTILE DYSFUNCTION TYPE: ICD-10-CM

## 2023-09-19 PROCEDURE — 99214 OFFICE O/P EST MOD 30 MIN: CPT | Performed by: UROLOGY

## 2023-09-19 NOTE — PROGRESS NOTES
and verified the radiology reports from:    No results found. PAST MEDICAL, FAMILY AND SOCIAL HISTORY:  Past Medical History:   Diagnosis Date    Cancer ([address])     prostrate    Hyperlipidemia     Hypertension     Type 1 diabetes mellitus with hypoglycemia without coma [facility])      Past Surgical History:   Procedure Laterality Date    COLONOSCOPY  2/11/13        PROSTATECTOMY Bilateral 3/16/2022    ROBOTIC ASSISTED LAPAROSCOPIC RADICAL PROSTATECTOMY WITH BILATERAL LYMPH NODE DISSECTION performed by Cuate Sun MD at [facility]     Family History   Problem Relation Age of Onset    Cancer Sister     Diabetes Paternal Grandfather      Outpatient Medications Marked as Taking for the 9/19/23 encounter (Office Visit) with Cuate Sun MD   Medication Sig Dispense Refill    hydroCHLOROthiazide (HYDRODIURIL) 25 MG tablet take 1 tablet by mouth every morning 90 tablet 3    losartan (COZAAR) 100 MG tablet Take 1 tablet by mouth daily 90 tablet 2    Continuous Blood Gluc Transmit (DEXCOM G6 TRANSMITTER) MISC CHANGE TRANSMITTER EVERY 90 DAYS 1 each 3    blood glucose monitor strips Test 1time a day & as needed for symptoms of irregular blood glucose. Dispense sufficient amount for indicated testing frequency plus additional to accommodate PRN testing needs.  100 strip 3    tadalafil (CIALIS) 5 MG tablet Take 1 tablet by mouth daily 30 tablet 11    Continuous Blood Gluc Sensor (DEXCOM G6 SENSOR) MISC Change every 10 days 3 each 11    simvastatin (ZOCOR) 40 MG tablet Take 1 tablet by mouth nightly 90 tablet 1    insulin glargine (LANTUS SOLOSTAR) 100 UNIT/ML injection pen INJECT 22 UNITS INTO THE SKIN NIGHTLY 45 mL 1    insulin aspart (NOVOLOG FLEXPEN) 100 UNIT/ML injection pen 30 units tid per scale 30 mL 2    Continuous Blood Gluc Sensor (DEXCOM G6 SENSOR) MISC CHANGE SENSOR EVERY 10 DAYS 12 each 0    sodium chloride (PF) 0.9 % SOLN 10 mL with papaverine 30 MG/ML SOLN 50 mg, phentolamine

## 2023-10-17 RX ORDER — INSULIN ASPART 100 [IU]/ML
INJECTION, SOLUTION INTRAVENOUS; SUBCUTANEOUS
Qty: 30 ML | Refills: 2 | Status: SHIPPED | OUTPATIENT
Start: 2023-10-17

## 2023-11-13 RX ORDER — AMLODIPINE BESYLATE 10 MG/1
TABLET ORAL
Qty: 90 TABLET | Refills: 2 | Status: SHIPPED | OUTPATIENT
Start: 2023-11-13

## 2023-12-28 DIAGNOSIS — E10.649 TYPE 1 DIABETES MELLITUS WITH HYPOGLYCEMIA AND WITHOUT COMA (HCC): ICD-10-CM

## 2023-12-28 RX ORDER — SIMVASTATIN 40 MG
40 TABLET ORAL NIGHTLY
Qty: 90 TABLET | Refills: 1 | Status: SHIPPED | OUTPATIENT
Start: 2023-12-28

## 2024-02-19 RX ORDER — INSULIN ASPART 100 [IU]/ML
INJECTION, SOLUTION INTRAVENOUS; SUBCUTANEOUS
Qty: 30 ML | Refills: 2 | Status: SHIPPED | OUTPATIENT
Start: 2024-02-19

## 2024-06-25 RX ORDER — PROCHLORPERAZINE 25 MG/1
SUPPOSITORY RECTAL
Qty: 3 EACH | Refills: 11 | Status: SHIPPED | OUTPATIENT
Start: 2024-06-25

## 2024-06-29 DIAGNOSIS — E10.649 TYPE 1 DIABETES MELLITUS WITH HYPOGLYCEMIA AND WITHOUT COMA (HCC): ICD-10-CM

## 2024-07-01 RX ORDER — SIMVASTATIN 40 MG
40 TABLET ORAL NIGHTLY
Qty: 30 TABLET | Refills: 0 | Status: SHIPPED | OUTPATIENT
Start: 2024-07-01

## 2024-07-15 RX ORDER — INSULIN ASPART 100 [IU]/ML
INJECTION, SOLUTION INTRAVENOUS; SUBCUTANEOUS
Qty: 30 ML | Refills: 2 | OUTPATIENT
Start: 2024-07-15

## 2024-07-15 RX ORDER — INSULIN ASPART 100 [IU]/ML
INJECTION, SOLUTION INTRAVENOUS; SUBCUTANEOUS
Qty: 30 ML | Refills: 2 | Status: SHIPPED | OUTPATIENT
Start: 2024-07-15

## 2024-07-29 RX ORDER — LOSARTAN POTASSIUM 100 MG/1
100 TABLET ORAL DAILY
Qty: 90 TABLET | Refills: 2 | OUTPATIENT
Start: 2024-07-29

## 2024-07-29 RX ORDER — AMLODIPINE BESYLATE 10 MG/1
TABLET ORAL
Qty: 90 TABLET | Refills: 2 | OUTPATIENT
Start: 2024-07-29

## 2024-07-31 ENCOUNTER — TELEPHONE (OUTPATIENT)
Dept: FAMILY MEDICINE CLINIC | Age: 64
End: 2024-07-31

## 2024-07-31 DIAGNOSIS — E10.649 TYPE 1 DIABETES MELLITUS WITH HYPOGLYCEMIA AND WITHOUT COMA (HCC): Primary | ICD-10-CM

## 2024-07-31 DIAGNOSIS — E78.5 HYPERLIPIDEMIA, UNSPECIFIED HYPERLIPIDEMIA TYPE: ICD-10-CM

## 2024-07-31 DIAGNOSIS — I10 PRIMARY HYPERTENSION: ICD-10-CM

## 2024-07-31 NOTE — TELEPHONE ENCOUNTER
Asking for any routine blood work to be ordered prior to his 08/12 appt.    No need to call back if labs are placed. He is going to [facility].

## 2024-08-02 DIAGNOSIS — E10.649 TYPE 1 DIABETES MELLITUS WITH HYPOGLYCEMIA AND WITHOUT COMA (HCC): ICD-10-CM

## 2024-08-02 RX ORDER — SIMVASTATIN 40 MG
40 TABLET ORAL NIGHTLY
Qty: 30 TABLET | Refills: 0 | Status: SHIPPED | OUTPATIENT
Start: 2024-08-02

## 2024-08-11 DIAGNOSIS — E11.9 CONTROLLED TYPE 2 DIABETES MELLITUS WITHOUT COMPLICATION, WITH LONG-TERM CURRENT USE OF INSULIN (HCC): ICD-10-CM

## 2024-08-11 DIAGNOSIS — Z79.4 CONTROLLED TYPE 2 DIABETES MELLITUS WITHOUT COMPLICATION, WITH LONG-TERM CURRENT USE OF INSULIN (HCC): ICD-10-CM

## 2024-08-13 RX ORDER — PROCHLORPERAZINE 25 MG/1
SUPPOSITORY RECTAL
Qty: 3 EACH | Refills: 3 | OUTPATIENT
Start: 2024-08-13

## 2024-08-13 RX ORDER — PROCHLORPERAZINE 25 MG/1
SUPPOSITORY RECTAL
Qty: 12 EACH | Refills: 3 | OUTPATIENT
Start: 2024-08-13

## 2024-08-13 NOTE — TELEPHONE ENCOUNTER
Patient verified that he is switching to [facility] in [place] and doesn't need these prescriptions yet, he can wait for refill at next appt.

## 2024-08-14 ENCOUNTER — HOSPITAL ENCOUNTER (OUTPATIENT)
Age: 64
Discharge: HOME | End: 2024-08-14
Payer: COMMERCIAL

## 2024-08-14 DIAGNOSIS — I10 PRIMARY HYPERTENSION: ICD-10-CM

## 2024-08-14 DIAGNOSIS — E10.649 TYPE 1 DIABETES MELLITUS WITH HYPOGLYCEMIA AND WITHOUT COMA (HCC): ICD-10-CM

## 2024-08-14 DIAGNOSIS — E78.5 HYPERLIPIDEMIA, UNSPECIFIED HYPERLIPIDEMIA TYPE: ICD-10-CM

## 2024-08-14 LAB
ALBUMIN SERPL BCG-MCNC: 4.3 G/DL (ref 3.5–5.1)
ALP SERPL-CCNC: 77 U/L (ref 38–126)
ALT SERPL W/O P-5'-P-CCNC: 8 U/L (ref 11–66)
ANION GAP SERPL CALC-SCNC: 12 MEQ/L (ref 8–16)
AST SERPL-CCNC: 19 U/L (ref 5–40)
BILIRUB SERPL-MCNC: 1.1 MG/DL (ref 0.3–1.2)
BUN SERPL-MCNC: 14 MG/DL (ref 7–22)
CALCIUM SERPL-MCNC: 9.4 MG/DL (ref 8.5–10.5)
CHLORIDE SERPL-SCNC: 98 MEQ/L (ref 98–111)
CHOLESTEROL, FASTING: 158 MG/DL (ref 100–199)
CO2 SERPL-SCNC: 26 MEQ/L (ref 23–33)
CREAT SERPL-MCNC: 0.9 MG/DL (ref 0.4–1.2)
CREAT UR-MCNC: 187.4 MG/DL
DEPRECATED MEAN GLUCOSE BLD GHB EST-ACNC: 123 MG/DL (ref 70–126)
DEPRECATED RDW RBC AUTO: 41.5 FL (ref 35–45)
ERYTHROCYTE [DISTWIDTH] IN BLOOD BY AUTOMATED COUNT: 12.6 % (ref 11.5–14.5)
GFR SERPL CREATININE-BSD FRML MDRD: > 90 ML/MIN/1.73M2
GLUCOSE SERPL-MCNC: 144 MG/DL (ref 70–108)
HBA1C MFR BLD HPLC: 6.1 % (ref 4.4–6.4)
HCT VFR BLD AUTO: 42.4 % (ref 42–52)
HDLC SERPL-MCNC: 60 MG/DL
HGB BLD-MCNC: 14.4 GM/DL (ref 14–18)
LDLC SERPL CALC-MCNC: 85 MG/DL
MCH RBC QN AUTO: 31 PG (ref 26–33)
MCHC RBC AUTO-ENTMCNC: 34 GM/DL (ref 32.2–35.5)
MCV RBC AUTO: 91.2 FL (ref 80–94)
MICROALBUMIN UR-MCNC: < 1.2 MG/DL
MICROALBUMIN/CREAT RATIO PNL UR: 6 MG/G (ref 0–30)
PLATELET # BLD AUTO: 279 THOU/MM3 (ref 130–400)
PMV BLD AUTO: 9.6 FL (ref 9.4–12.4)
POTASSIUM SERPL-SCNC: 4.3 MEQ/L (ref 3.5–5.2)
PROT SERPL-MCNC: 7 G/DL (ref 6.1–8)
RBC # BLD AUTO: 4.65 MILL/MM3 (ref 4.7–6.1)
SODIUM SERPL-SCNC: 136 MEQ/L (ref 135–145)
TRIGLYCERIDE, FASTING: 66 MG/DL (ref 0–199)
WBC # BLD AUTO: 6.1 THOU/MM3 (ref 4.8–10.8)

## 2024-08-14 PROCEDURE — 85027 COMPLETE CBC AUTOMATED: CPT

## 2024-08-14 PROCEDURE — 83036 HEMOGLOBIN GLYCOSYLATED A1C: CPT

## 2024-08-14 PROCEDURE — 82043 UR ALBUMIN QUANTITATIVE: CPT

## 2024-08-14 PROCEDURE — 80053 COMPREHEN METABOLIC PANEL: CPT

## 2024-08-14 PROCEDURE — 36415 COLL VENOUS BLD VENIPUNCTURE: CPT

## 2024-08-14 PROCEDURE — 80061 LIPID PANEL: CPT

## 2024-08-20 ENCOUNTER — OFFICE VISIT (OUTPATIENT)
Dept: FAMILY MEDICINE CLINIC | Age: 64
End: 2024-08-20
Payer: COMMERCIAL

## 2024-08-20 VITALS
SYSTOLIC BLOOD PRESSURE: 128 MMHG | HEIGHT: 69 IN | WEIGHT: 211 LBS | DIASTOLIC BLOOD PRESSURE: 80 MMHG | HEART RATE: 80 BPM | BODY MASS INDEX: 31.25 KG/M2 | RESPIRATION RATE: 20 BRPM | TEMPERATURE: 98.2 F

## 2024-08-20 DIAGNOSIS — C61 PROSTATE CANCER (HCC): ICD-10-CM

## 2024-08-20 DIAGNOSIS — E78.5 HYPERLIPIDEMIA, UNSPECIFIED HYPERLIPIDEMIA TYPE: ICD-10-CM

## 2024-08-20 DIAGNOSIS — E10.649 TYPE 1 DIABETES MELLITUS WITH HYPOGLYCEMIA AND WITHOUT COMA (HCC): ICD-10-CM

## 2024-08-20 DIAGNOSIS — I10 PRIMARY HYPERTENSION: Primary | ICD-10-CM

## 2024-08-20 PROCEDURE — 3044F HG A1C LEVEL LT 7.0%: CPT | Performed by: FAMILY MEDICINE

## 2024-08-20 PROCEDURE — 99214 OFFICE O/P EST MOD 30 MIN: CPT | Performed by: FAMILY MEDICINE

## 2024-08-20 PROCEDURE — 3074F SYST BP LT 130 MM HG: CPT | Performed by: FAMILY MEDICINE

## 2024-08-20 PROCEDURE — 3079F DIAST BP 80-89 MM HG: CPT | Performed by: FAMILY MEDICINE

## 2024-08-20 RX ORDER — LOSARTAN POTASSIUM 100 MG/1
100 TABLET ORAL DAILY
Qty: 90 TABLET | Refills: 3 | Status: SHIPPED | OUTPATIENT
Start: 2024-08-20

## 2024-08-20 RX ORDER — SIMVASTATIN 40 MG
40 TABLET ORAL NIGHTLY
Qty: 90 TABLET | Refills: 3 | Status: SHIPPED | OUTPATIENT
Start: 2024-08-20

## 2024-08-20 RX ORDER — TADALAFIL 5 MG/1
5 TABLET ORAL DAILY
Qty: 90 TABLET | Refills: 3 | Status: SHIPPED | OUTPATIENT
Start: 2024-08-20

## 2024-08-20 RX ORDER — PROCHLORPERAZINE 25 MG/1
SUPPOSITORY RECTAL
Qty: 12 EACH | Refills: 3 | Status: SHIPPED | OUTPATIENT
Start: 2024-08-20

## 2024-08-20 RX ORDER — INSULIN GLARGINE 100 [IU]/ML
INJECTION, SOLUTION SUBCUTANEOUS
Qty: 45 ML | Refills: 1 | Status: SHIPPED | OUTPATIENT
Start: 2024-08-20

## 2024-08-20 RX ORDER — PROCHLORPERAZINE 25 MG/1
SUPPOSITORY RECTAL
Qty: 1 EACH | Refills: 3 | Status: SHIPPED | OUTPATIENT
Start: 2024-08-20

## 2024-08-20 RX ORDER — INSULIN ASPART 100 [IU]/ML
INJECTION, SOLUTION INTRAVENOUS; SUBCUTANEOUS
Qty: 30 ML | Refills: 2 | Status: SHIPPED | OUTPATIENT
Start: 2024-08-20

## 2024-08-20 RX ORDER — GLUCOSAMINE HCL/CHONDROITIN SU 500-400 MG
CAPSULE ORAL
Qty: 100 STRIP | Refills: 3 | Status: SHIPPED | OUTPATIENT
Start: 2024-08-20

## 2024-08-20 RX ORDER — HYDROCHLOROTHIAZIDE 25 MG/1
25 TABLET ORAL EVERY MORNING
Qty: 90 TABLET | Refills: 3 | Status: SHIPPED | OUTPATIENT
Start: 2024-08-20

## 2024-08-20 RX ORDER — AMLODIPINE BESYLATE 10 MG/1
TABLET ORAL
Qty: 90 TABLET | Refills: 3 | Status: SHIPPED | OUTPATIENT
Start: 2024-08-20

## 2024-08-20 SDOH — ECONOMIC STABILITY: INCOME INSECURITY: HOW HARD IS IT FOR YOU TO PAY FOR THE VERY BASICS LIKE FOOD, HOUSING, MEDICAL CARE, AND HEATING?: NOT VERY HARD

## 2024-08-20 SDOH — ECONOMIC STABILITY: FOOD INSECURITY: WITHIN THE PAST 12 MONTHS, THE FOOD YOU BOUGHT JUST DIDN'T LAST AND YOU DIDN'T HAVE MONEY TO GET MORE.: NEVER TRUE

## 2024-08-20 SDOH — ECONOMIC STABILITY: FOOD INSECURITY: WITHIN THE PAST 12 MONTHS, YOU WORRIED THAT YOUR FOOD WOULD RUN OUT BEFORE YOU GOT MONEY TO BUY MORE.: NEVER TRUE

## 2024-08-20 ASSESSMENT — PATIENT HEALTH QUESTIONNAIRE - PHQ9
SUM OF ALL RESPONSES TO PHQ QUESTIONS 1-9: 0
1. LITTLE INTEREST OR PLEASURE IN DOING THINGS: NOT AT ALL
SUM OF ALL RESPONSES TO PHQ9 QUESTIONS 1 & 2: 0
2. FEELING DOWN, DEPRESSED OR HOPELESS: NOT AT ALL
SUM OF ALL RESPONSES TO PHQ QUESTIONS 1-9: 0

## 2024-09-06 ENCOUNTER — HOSPITAL ENCOUNTER (OUTPATIENT)
Age: 64
Discharge: HOME | End: 2024-09-06
Payer: COMMERCIAL

## 2024-09-06 DIAGNOSIS — C61 PROSTATE CANCER (HCC): ICD-10-CM

## 2024-09-06 LAB — PSA SERPL-MCNC: < 0.02 NG/ML (ref 0–1)

## 2024-09-06 PROCEDURE — 36415 COLL VENOUS BLD VENIPUNCTURE: CPT

## 2024-09-06 PROCEDURE — 84153 ASSAY OF PSA TOTAL: CPT

## 2024-09-12 ENCOUNTER — OFFICE VISIT (OUTPATIENT)
Dept: UROLOGY | Age: 64
End: 2024-09-12
Payer: COMMERCIAL

## 2024-09-12 VITALS — RESPIRATION RATE: 20 BRPM | WEIGHT: 210 LBS | BODY MASS INDEX: 31.1 KG/M2 | HEIGHT: 69 IN

## 2024-09-12 DIAGNOSIS — C61 PROSTATE CANCER (HCC): Primary | ICD-10-CM

## 2024-09-12 DIAGNOSIS — N39.3 SUI (STRESS URINARY INCONTINENCE), MALE: ICD-10-CM

## 2024-09-12 DIAGNOSIS — N52.9 ERECTILE DYSFUNCTION, UNSPECIFIED ERECTILE DYSFUNCTION TYPE: ICD-10-CM

## 2024-09-12 PROCEDURE — 99214 OFFICE O/P EST MOD 30 MIN: CPT | Performed by: NURSE PRACTITIONER

## 2024-12-12 DIAGNOSIS — E10.649 TYPE 1 DIABETES MELLITUS WITH HYPOGLYCEMIA AND WITHOUT COMA (HCC): ICD-10-CM

## 2024-12-12 RX ORDER — INSULIN ASPART 100 [IU]/ML
INJECTION, SOLUTION INTRAVENOUS; SUBCUTANEOUS
Qty: 30 ML | Refills: 2 | Status: SHIPPED | OUTPATIENT
Start: 2024-12-12

## 2025-02-06 ENCOUNTER — TELEPHONE (OUTPATIENT)
Dept: FAMILY MEDICINE CLINIC | Age: 65
End: 2025-02-06

## 2025-02-06 RX ORDER — OSELTAMIVIR PHOSPHATE 75 MG/1
75 CAPSULE ORAL 2 TIMES DAILY
Qty: 10 CAPSULE | Refills: 0 | Status: SHIPPED | OUTPATIENT
Start: 2025-02-06 | End: 2025-02-11

## 2025-02-06 NOTE — TELEPHONE ENCOUNTER
Sore throat, cough, body aches, runny nose. Symptoms started yesterday. Leaving for [place] Saturday, requesting Tamiflu without appointment.     [facility]  Will call pharmacy

## 2025-04-02 DIAGNOSIS — E10.649 TYPE 1 DIABETES MELLITUS WITH HYPOGLYCEMIA AND WITHOUT COMA (HCC): ICD-10-CM

## 2025-04-02 RX ORDER — INSULIN ASPART 100 [IU]/ML
INJECTION, SOLUTION INTRAVENOUS; SUBCUTANEOUS
Qty: 30 ML | Refills: 2 | Status: SHIPPED | OUTPATIENT
Start: 2025-04-02

## 2025-08-14 DIAGNOSIS — E10.649 TYPE 1 DIABETES MELLITUS WITH HYPOGLYCEMIA AND WITHOUT COMA (HCC): ICD-10-CM

## 2025-08-14 RX ORDER — INSULIN ASPART 100 [IU]/ML
INJECTION, SOLUTION INTRAVENOUS; SUBCUTANEOUS
Qty: 30 ML | Refills: 2 | OUTPATIENT
Start: 2025-08-14

## 2025-08-14 RX ORDER — LOSARTAN POTASSIUM 100 MG/1
100 TABLET ORAL DAILY
Qty: 90 TABLET | Refills: 3 | OUTPATIENT
Start: 2025-08-14

## 2025-08-27 ENCOUNTER — OFFICE VISIT (OUTPATIENT)
Dept: FAMILY MEDICINE CLINIC | Age: 65
End: 2025-08-27
Payer: COMMERCIAL

## 2025-08-27 VITALS
SYSTOLIC BLOOD PRESSURE: 116 MMHG | HEART RATE: 76 BPM | DIASTOLIC BLOOD PRESSURE: 70 MMHG | RESPIRATION RATE: 16 BRPM | WEIGHT: 210.1 LBS | TEMPERATURE: 98 F | OXYGEN SATURATION: 98 % | HEIGHT: 69 IN | BODY MASS INDEX: 31.12 KG/M2

## 2025-08-27 DIAGNOSIS — B35.1 ONYCHOMYCOSIS: ICD-10-CM

## 2025-08-27 DIAGNOSIS — E10.649 TYPE 1 DIABETES MELLITUS WITH HYPOGLYCEMIA AND WITHOUT COMA (HCC): ICD-10-CM

## 2025-08-27 DIAGNOSIS — E78.5 HYPERLIPIDEMIA, UNSPECIFIED HYPERLIPIDEMIA TYPE: ICD-10-CM

## 2025-08-27 DIAGNOSIS — C61 PROSTATE CANCER (HCC): ICD-10-CM

## 2025-08-27 DIAGNOSIS — Z00.00 WELL ADULT EXAM: Primary | ICD-10-CM

## 2025-08-27 DIAGNOSIS — I10 PRIMARY HYPERTENSION: ICD-10-CM

## 2025-08-27 DIAGNOSIS — F41.9 ANXIETY: ICD-10-CM

## 2025-08-27 PROCEDURE — 99396 PREV VISIT EST AGE 40-64: CPT | Performed by: FAMILY MEDICINE

## 2025-08-27 PROCEDURE — 90677 PCV20 VACCINE IM: CPT | Performed by: FAMILY MEDICINE

## 2025-08-27 PROCEDURE — 3074F SYST BP LT 130 MM HG: CPT | Performed by: FAMILY MEDICINE

## 2025-08-27 PROCEDURE — 90715 TDAP VACCINE 7 YRS/> IM: CPT | Performed by: FAMILY MEDICINE

## 2025-08-27 PROCEDURE — 90472 IMMUNIZATION ADMIN EACH ADD: CPT | Performed by: FAMILY MEDICINE

## 2025-08-27 PROCEDURE — 3078F DIAST BP <80 MM HG: CPT | Performed by: FAMILY MEDICINE

## 2025-08-27 PROCEDURE — 90471 IMMUNIZATION ADMIN: CPT | Performed by: FAMILY MEDICINE

## 2025-08-27 RX ORDER — LOSARTAN POTASSIUM 100 MG/1
100 TABLET ORAL DAILY
Qty: 90 TABLET | Refills: 3 | Status: SHIPPED | OUTPATIENT
Start: 2025-08-27

## 2025-08-27 RX ORDER — HYDROCHLOROTHIAZIDE 25 MG/1
25 TABLET ORAL EVERY MORNING
Qty: 90 TABLET | Refills: 3 | Status: SHIPPED | OUTPATIENT
Start: 2025-08-27

## 2025-08-27 RX ORDER — GLUCOSAMINE HCL/CHONDROITIN SU 500-400 MG
CAPSULE ORAL
Qty: 100 STRIP | Refills: 3 | Status: SHIPPED | OUTPATIENT
Start: 2025-08-27

## 2025-08-27 RX ORDER — AMLODIPINE BESYLATE 10 MG/1
TABLET ORAL
Qty: 90 TABLET | Refills: 3 | Status: SHIPPED | OUTPATIENT
Start: 2025-08-27

## 2025-08-27 RX ORDER — TERBINAFINE HYDROCHLORIDE 250 MG/1
250 TABLET ORAL DAILY
Qty: 90 TABLET | Refills: 0 | Status: SHIPPED | OUTPATIENT
Start: 2025-08-27

## 2025-08-27 RX ORDER — SIMVASTATIN 40 MG
40 TABLET ORAL NIGHTLY
Qty: 90 TABLET | Refills: 3 | Status: SHIPPED | OUTPATIENT
Start: 2025-08-27

## 2025-08-27 RX ORDER — ALPRAZOLAM 0.5 MG
0.5 TABLET ORAL 2 TIMES DAILY PRN
Qty: 60 TABLET | Refills: 0 | Status: SHIPPED | OUTPATIENT
Start: 2025-08-27 | End: 2025-09-26

## 2025-08-27 RX ORDER — INSULIN GLARGINE 100 [IU]/ML
INJECTION, SOLUTION SUBCUTANEOUS
Qty: 45 ML | Refills: 1 | Status: SHIPPED | OUTPATIENT
Start: 2025-08-27

## 2025-08-27 RX ORDER — PROCHLORPERAZINE 25 MG/1
SUPPOSITORY RECTAL
Qty: 12 EACH | Refills: 3 | Status: SHIPPED | OUTPATIENT
Start: 2025-08-27

## 2025-08-27 RX ORDER — PROCHLORPERAZINE 25 MG/1
SUPPOSITORY RECTAL
Qty: 1 EACH | Refills: 3 | Status: SHIPPED | OUTPATIENT
Start: 2025-08-27

## 2025-08-27 SDOH — ECONOMIC STABILITY: FOOD INSECURITY: WITHIN THE PAST 12 MONTHS, THE FOOD YOU BOUGHT JUST DIDN'T LAST AND YOU DIDN'T HAVE MONEY TO GET MORE.: NEVER TRUE

## 2025-08-27 SDOH — ECONOMIC STABILITY: FOOD INSECURITY: WITHIN THE PAST 12 MONTHS, YOU WORRIED THAT YOUR FOOD WOULD RUN OUT BEFORE YOU GOT MONEY TO BUY MORE.: NEVER TRUE

## 2025-08-27 ASSESSMENT — PATIENT HEALTH QUESTIONNAIRE - PHQ9
2. FEELING DOWN, DEPRESSED OR HOPELESS: NOT AT ALL
SUM OF ALL RESPONSES TO PHQ QUESTIONS 1-9: 0
1. LITTLE INTEREST OR PLEASURE IN DOING THINGS: NOT AT ALL

## 2025-08-28 RX ORDER — INSULIN ASPART 100 [IU]/ML
INJECTION, SOLUTION INTRAVENOUS; SUBCUTANEOUS
Qty: 30 ML | Refills: 2 | Status: SHIPPED | OUTPATIENT
Start: 2025-08-28

## 2025-09-01 ASSESSMENT — ENCOUNTER SYMPTOMS
CONSTIPATION: 0
RHINORRHEA: 0
EYE PAIN: 0
COUGH: 0
ABDOMINAL DISTENTION: 0
NAUSEA: 0
DIARRHEA: 0
SINUS PRESSURE: 0
SORE THROAT: 0
SHORTNESS OF BREATH: 0
ABDOMINAL PAIN: 0

## 2025-09-06 ENCOUNTER — HOSPITAL ENCOUNTER (OUTPATIENT)
Dept: GENERAL RADIOLOGY | Age: 65
Discharge: HOME | End: 2025-09-06
Payer: COMMERCIAL

## 2025-09-06 DIAGNOSIS — C61 PROSTATE CANCER (HCC): ICD-10-CM

## 2025-09-06 DIAGNOSIS — E10.649 TYPE 1 DIABETES MELLITUS WITH HYPOGLYCEMIA AND WITHOUT COMA (HCC): ICD-10-CM

## 2025-09-06 DIAGNOSIS — E78.5 HYPERLIPIDEMIA, UNSPECIFIED HYPERLIPIDEMIA TYPE: ICD-10-CM

## 2025-09-06 DIAGNOSIS — I10 PRIMARY HYPERTENSION: ICD-10-CM

## 2025-09-06 LAB
ALBUMIN SERPL BCG-MCNC: 4.5 G/DL (ref 3.4–4.9)
ALP SERPL-CCNC: 73 U/L (ref 40–129)
ALT SERPL W/O P-5'-P-CCNC: 8 U/L (ref 10–50)
ANION GAP SERPL CALC-SCNC: 10 MEQ/L (ref 8–16)
AST SERPL-CCNC: 17 U/L (ref 10–50)
BASOPHILS ABSOLUTE: 0 THOU/MM3 (ref 0–0.1)
BASOPHILS NFR BLD AUTO: 0.7 %
BILIRUB SERPL-MCNC: 1.6 MG/DL (ref 0.3–1.2)
BUN SERPL-MCNC: 13 MG/DL (ref 8–23)
CALCIUM SERPL-MCNC: 9.4 MG/DL (ref 8.5–10.5)
CHLORIDE SERPL-SCNC: 97 MEQ/L (ref 98–111)
CHOLEST SERPL-MCNC: 139 MG/DL (ref 100–199)
CO2 SERPL-SCNC: 26 MEQ/L (ref 22–29)
CREAT SERPL-MCNC: 1 MG/DL (ref 0.7–1.2)
CREAT UR-MCNC: 88.5 MG/DL
DEPRECATED MEAN GLUCOSE BLD GHB EST-ACNC: 141 MG/DL (ref 70–126)
DEPRECATED RDW RBC AUTO: 43.6 FL (ref 35–45)
EOSINOPHIL NFR BLD AUTO: 2.2 %
EOSINOPHILS ABSOLUTE: 0.1 THOU/MM3 (ref 0–0.4)
ERYTHROCYTE [DISTWIDTH] IN BLOOD BY AUTOMATED COUNT: 12.8 % (ref 11.5–14.5)
GFR SERPL CREATININE-BSD FRML MDRD: 84 ML/MIN/1.73M2
GLUCOSE SERPL-MCNC: 253 MG/DL (ref 74–109)
HBA1C MFR BLD HPLC: 6.7 % (ref 4–6)
HCT VFR BLD AUTO: 40.7 % (ref 42–52)
HDLC SERPL-MCNC: 58 MG/DL
HGB BLD-MCNC: 13.8 GM/DL (ref 14–18)
IMM GRANULOCYTES # BLD AUTO: 0 THOU/MM3 (ref 0–0.07)
IMM GRANULOCYTES NFR BLD AUTO: 0 %
LDLC SERPL CALC-MCNC: 67 MG/DL
LYMPHOCYTES ABSOLUTE: 1.1 THOU/MM3 (ref 1–4.8)
LYMPHOCYTES NFR BLD AUTO: 20.4 %
MCH RBC QN AUTO: 31.4 PG (ref 26–33)
MCHC RBC AUTO-ENTMCNC: 33.9 GM/DL (ref 32.2–35.5)
MCV RBC AUTO: 92.5 FL (ref 80–94)
MICROALBUMIN UR-MCNC: < 2 MG/DL
MICROALBUMIN/CREAT RATIO PNL UR: 23 MG/G (ref 0–30)
MONOCYTES ABSOLUTE: 0.7 THOU/MM3 (ref 0.4–1.3)
MONOCYTES NFR BLD AUTO: 13.1 %
NEUTROPHILS ABSOLUTE: 3.5 THOU/MM3 (ref 1.8–7.7)
NEUTROPHILS NFR BLD AUTO: 63.6 %
NRBC BLD AUTO-RTO: 0 /100 WBC
PLATELET # BLD AUTO: 243 THOU/MM3 (ref 130–400)
PMV BLD AUTO: 9.7 FL (ref 9.4–12.4)
POTASSIUM SERPL-SCNC: 4.3 MEQ/L (ref 3.5–5.2)
PROT SERPL-MCNC: 6.7 G/DL (ref 6.4–8.3)
PSA SERPL-MCNC: < 0.02 NG/ML (ref 0–1)
RBC # BLD AUTO: 4.4 MILL/MM3 (ref 4.7–6.1)
SODIUM SERPL-SCNC: 133 MEQ/L (ref 135–145)
TRIGL SERPL-MCNC: 69 MG/DL (ref 0–199)
WBC # BLD AUTO: 5.5 THOU/MM3 (ref 4.8–10.8)

## 2025-09-06 PROCEDURE — 80053 COMPREHEN METABOLIC PANEL: CPT

## 2025-09-06 PROCEDURE — 82043 UR ALBUMIN QUANTITATIVE: CPT

## 2025-09-06 PROCEDURE — 36415 COLL VENOUS BLD VENIPUNCTURE: CPT

## 2025-09-06 PROCEDURE — 82570 ASSAY OF URINE CREATININE: CPT

## 2025-09-06 PROCEDURE — 85025 COMPLETE CBC W/AUTO DIFF WBC: CPT

## 2025-09-06 PROCEDURE — 84153 ASSAY OF PSA TOTAL: CPT

## 2025-09-06 PROCEDURE — 80061 LIPID PANEL: CPT

## 2025-09-06 PROCEDURE — 83036 HEMOGLOBIN GLYCOSYLATED A1C: CPT

## (undated) DEVICE — PACK-MAJOR

## (undated) DEVICE — GOWN,SIRUS,NONRNF,SETINSLV,XL,20/CS: Brand: MEDLINE

## (undated) DEVICE — BANDAGE COMPR W4INXL12FT E DISP ESMARCH EVEN

## (undated) DEVICE — SYRINGE MED 10ML LUERLOCK TIP W/O SFTY DISP

## (undated) DEVICE — 3M™ IOBAN™ 2 ANTIMICROBIAL INCISE DRAPE 6650EZ: Brand: IOBAN™ 2

## (undated) DEVICE — TISSUE RETRIEVAL SYSTEM: Brand: INZII RETRIEVAL SYSTEM

## (undated) DEVICE — GOWN,SIRUS,NON REINFRCD,LARGE,SET IN SL: Brand: MEDLINE

## (undated) DEVICE — INSUFFLATION NEEDLE TO ESTABLISH PNEUMOPERITONEUM.: Brand: INSUFFLATION NEEDLE

## (undated) DEVICE — SUTURE MCRYL SZ 3-0 L27IN ABSRB VLT L17MM RB-1 1/2 CIR Y305H

## (undated) DEVICE — SOLUTION SURG PREP POV IOD 7.5% 4 OZ

## (undated) DEVICE — Z DISCONTINUED BY MEDLINE USE 2711682 TRAY SKIN PREP DRY W/ PREM GLV

## (undated) DEVICE — PENCIL SMK EVAC ALL IN 1 DSGN ENH VISIBILITY IMPROVED AIR

## (undated) DEVICE — DRAPE,ROBOTICS,STERILE: Brand: MEDLINE

## (undated) DEVICE — PUMP SUC IRR TBNG L10FT W/ HNDPC ASSEMB STRYKEFLOW 2

## (undated) DEVICE — CONTAINER,SPECIMEN,PNEU TUBE,4OZ,OR STRL: Brand: MEDLINE

## (undated) DEVICE — SUTURE VCRL + SZ 2-0 L27IN ABSRB WHT SH 1/2 CIR TAPERCUT VCP417H

## (undated) DEVICE — TRI-LUMEN FILTERED TUBE SET WITH ACTIVATED CHARCOAL FILTER: Brand: AIRSEAL

## (undated) DEVICE — SUTURE MCRYL SZ 4-0 L27IN ABSRB UD L19MM PS-2 1/2 CIR PRIM Y426H

## (undated) DEVICE — SYRINGE CATH TIP 50ML

## (undated) DEVICE — COVER EQUIP STEEP TREND EZ CLN UP WTRPRF DISP FOR STD SZ

## (undated) DEVICE — PACK PROCEDURE SURG SET UP SRMC

## (undated) DEVICE — TIP COVER ACCESSORY

## (undated) DEVICE — AIRSEAL 12 MM ACCESS PORT AND PALM GRIP OBTURATOR WITH BLADELESS OPTICAL TIP, 120 MM LENGTH: Brand: AIRSEAL

## (undated) DEVICE — INTENDED FOR TISSUE SEPARATION, AND OTHER PROCEDURES THAT REQUIRE A SHARP SURGICAL BLADE TO PUNCTURE OR CUT.: Brand: BARD-PARKER ® CARBON RIB-BACK BLADES

## (undated) DEVICE — TROCAR: Brand: KII FIOS FIRST ENTRY

## (undated) DEVICE — 35 ML SYRINGE LUER-LOCK TIP: Brand: MONOJECT

## (undated) DEVICE — HYPODERMIC SAFETY NEEDLE: Brand: MAGELLAN

## (undated) DEVICE — SOLUTION ANTIFOG VIS SYS CLEARIFY LAPSCP

## (undated) DEVICE — ELECTRO LUBE IS A SINGLE PATIENT USE DEVICE THAT IS INTENDED TO BE USED ON ELECTROSURGICAL ELECTRODES TO REDUCE STICKING.: Brand: KEY SURGICAL ELECTRO LUBE

## (undated) DEVICE — CLIP INT L POLYMER LOK LIG HEM O LOK

## (undated) DEVICE — GAUZE,SPONGE,8"X4",12PLY,XRAY,STRL,LF: Brand: MEDLINE

## (undated) DEVICE — GLOVE ORANGE PI 7 1/2   MSG9075

## (undated) DEVICE — CATHETER,FOLEY,SILI-ELAST,LTX,20FR,30ML: Brand: MEDLINE

## (undated) DEVICE — SUTURE ABSORB MONOFILAMENT 3-0 RB 1 6IN STRATAFIX SPRL SXMP2B402

## (undated) DEVICE — ARM DRAPE

## (undated) DEVICE — BLADELESS OBTURATOR: Brand: WECK VISTA

## (undated) DEVICE — COLUMN DRAPE

## (undated) DEVICE — SUTURE ABSORBABLE MONOFILAMENT 0 CT-2 12 IN STRATAFIX SPRL SXPP1B405

## (undated) DEVICE — DRAINBAG,ANTI-REFLUX TOWER,L/F,2000ML,LL: Brand: MEDLINE

## (undated) DEVICE — CANNULA SEAL

## (undated) DEVICE — SUTURE VCRL + SZ 0 L18IN ABSRB UD L36MM CT-1 1/2 CIR VCP840D

## (undated) DEVICE — ADHESIVE SKIN CLSR 0.7ML TOP DERMBND ADV

## (undated) DEVICE — CLIP INT XL YEL POLYMER HEM-O-LOK WECK

## (undated) DEVICE — SUTURE V-LOC 180 SZ 2-0 L12IN ABSRB VLT GS-21 L37MM 1/2 CIR VLOCM0315